# Patient Record
Sex: MALE | Race: WHITE | NOT HISPANIC OR LATINO | Employment: FULL TIME | URBAN - METROPOLITAN AREA
[De-identification: names, ages, dates, MRNs, and addresses within clinical notes are randomized per-mention and may not be internally consistent; named-entity substitution may affect disease eponyms.]

---

## 2017-03-08 ENCOUNTER — TELEPHONE (OUTPATIENT)
Dept: INTERNAL MEDICINE | Facility: CLINIC | Age: 67
End: 2017-03-08

## 2017-03-08 DIAGNOSIS — I10 ESSENTIAL HYPERTENSION: Primary | ICD-10-CM

## 2017-03-08 NOTE — TELEPHONE ENCOUNTER
Pt requesting to speak with  in reference to his blood pressure medication. Pharmacy is [facility]. Pt Ph# [phone] carlos/ma

## 2017-03-09 ENCOUNTER — TELEPHONE (OUTPATIENT)
Dept: INTERNAL MEDICINE | Facility: CLINIC | Age: 67
End: 2017-03-09

## 2017-03-09 RX ORDER — NIFEDIPINE 30 MG/1
60 TABLET, FILM COATED, EXTENDED RELEASE ORAL DAILY
Qty: 180 TABLET | Refills: 3 | Status: SHIPPED | OUTPATIENT
Start: 2017-03-09 | End: 2017-03-21

## 2017-03-10 DIAGNOSIS — B18.1 CHRONIC HEPATITIS B VIRUS INFECTION: Primary | ICD-10-CM

## 2017-03-10 RX ORDER — LAMIVUDINE 100 MG/1
100 TABLET, FILM COATED ORAL DAILY
Qty: 90 TABLET | Refills: 0 | Status: SHIPPED | OUTPATIENT
Start: 2017-03-10 | End: 2017-04-09

## 2017-03-21 ENCOUNTER — OFFICE VISIT (OUTPATIENT)
Dept: INTERNAL MEDICINE | Facility: CLINIC | Age: 67
End: 2017-03-21
Payer: COMMERCIAL

## 2017-03-21 VITALS
OXYGEN SATURATION: 97 % | RESPIRATION RATE: 18 BRPM | HEIGHT: 72 IN | BODY MASS INDEX: 30.58 KG/M2 | WEIGHT: 225.75 LBS | SYSTOLIC BLOOD PRESSURE: 158 MMHG | HEART RATE: 90 BPM | DIASTOLIC BLOOD PRESSURE: 68 MMHG

## 2017-03-21 DIAGNOSIS — N18.30 CKD (CHRONIC KIDNEY DISEASE) STAGE 3, GFR 30-59 ML/MIN: ICD-10-CM

## 2017-03-21 DIAGNOSIS — E55.9 VITAMIN D DEFICIENCY: Chronic | ICD-10-CM

## 2017-03-21 DIAGNOSIS — B18.1 CHRONIC VIRAL HEPATITIS B WITHOUT DELTA AGENT AND WITHOUT COMA: Primary | Chronic | ICD-10-CM

## 2017-03-21 DIAGNOSIS — I10 ESSENTIAL HYPERTENSION: ICD-10-CM

## 2017-03-21 PROCEDURE — 1160F RVW MEDS BY RX/DR IN RCRD: CPT | Mod: S$GLB,,, | Performed by: INTERNAL MEDICINE

## 2017-03-21 PROCEDURE — 99214 OFFICE O/P EST MOD 30 MIN: CPT | Mod: S$GLB,,, | Performed by: INTERNAL MEDICINE

## 2017-03-21 PROCEDURE — 3078F DIAST BP <80 MM HG: CPT | Mod: S$GLB,,, | Performed by: INTERNAL MEDICINE

## 2017-03-21 PROCEDURE — 3077F SYST BP >= 140 MM HG: CPT | Mod: S$GLB,,, | Performed by: INTERNAL MEDICINE

## 2017-03-21 RX ORDER — HYDRALAZINE HYDROCHLORIDE 50 MG/1
TABLET, FILM COATED ORAL
Refills: 3 | COMMUNITY
Start: 2017-03-08

## 2017-03-21 RX ORDER — LOSARTAN POTASSIUM 100 MG/1
100 TABLET ORAL DAILY
Refills: 3 | COMMUNITY
Start: 2017-01-12

## 2017-03-21 RX ORDER — DOXAZOSIN 8 MG/1
8 TABLET ORAL NIGHTLY
Refills: 3 | COMMUNITY
Start: 2017-01-12

## 2017-03-21 NOTE — MR AVS SNAPSHOT
[facility] Medicine  [address],  [address]  [place] [address]  Phone: [phone]  Fax: [phone]                  Nader Clarke   3/21/2017 10:20 AM   Office Visit    Description:  Male : 1950   Provider:  Shashank Tran MD   Department:  [facility]           Reason for Visit     Hepatitis B     Results           Diagnoses this Visit        Comments    Chronic viral hepatitis B without delta agent and without coma    -  Primary     Essential hypertension         CKD (chronic kidney disease) stage 3, GFR 30-59 ml/min         Vitamin D deficiency                To Do List           Future Appointments        Provider Department Dept Phone    3/21/2017 10:20 AM Shashank Tran MD [facility] [phone]      Goals (5 Years of Data)     None      [facility]     [facility] On [facility] Nurse Care Line -  Assistance  Registered nurses in the [facility] Center provide clinical advisement, health education, appointment booking, and other advisory services.  Call for this free service at [phone].             Medications           Message regarding Medications     Verify the changes and/or additions to your medication regime listed below are the same as discussed with your clinician today.  If any of these changes or additions are incorrect, please notify your healthcare provider.        STOP taking these medications     nifedipine (ADALAT CC) 30 MG TbSR Take 2 tablets (60 mg total) by mouth once daily.           Verify that the below list of medications is an accurate representation of the medications you are currently taking.  If none reported, the list may be blank. If incorrect, please contact your healthcare provider. Carry this list with you in case of emergency.           Current Medications     allopurinol (ZYLOPRIM) 100 MG tablet Take 100 mg by mouth Daily.    cyanocobalamin (VITAMIN B-12) 100 MCG tablet Take  100 mcg by mouth once daily.    cyclobenzaprine (FLEXERIL) 10 MG tablet Take 10 mg by mouth 2 (two) times daily.    doxazosin (CARDURA) 8 MG Tab Take 8 mg by mouth every evening.    ergocalciferol (ERGOCALCIFEROL) 50,000 unit Cap Take 1 capsule (50,000 Units total) by mouth every 7 days.    hydrALAZINE (APRESOLINE) 50 MG tablet TAKE 1 TABLET BY MOUTH THREE TIMES A DAY HOLD IF BLOOD PRESSURE IS LESS THAN 130 SYSTOLIC.    hydrocodone-acetaminophen 7.5-325mg (NORCO) 7.5-325 mg per tablet Take 1 tablet by mouth every 12 (twelve) hours as needed.    lamivudine (EPIVIR) 100 MG Tab Take 1 tablet (100 mg total) by mouth once daily.    losartan (COZAAR) 100 MG tablet Take 100 mg by mouth once daily.           Clinical Reference Information           Your Vitals Were     BP Pulse Resp Height Weight SpO2    158/68 (BP Location: Left arm, Patient Position: Sitting, BP Method: Manual) 90 18 6' (1.829 m) 102.4 kg (225 lb 12 oz) 97%    BMI                30.62 kg/m2          Blood Pressure          Most Recent Value    BP  (!)  158/68      Allergies as of 3/21/2017     No Known Allergies      Immunizations Administered on Date of Encounter - 3/21/2017     None      [facility] Sign-Up     Activating your [facility] account is as easy as 1-2-3!     1) Visit [email or web address], select Sign Up Now, enter this activation code and your date of birth, then select Next.  [email or web address]  Expires: 5/5/2017 10:13 AM      2) Create a username and password to use when you visit [facility] in the future and select a security question in case you lose your password and select Next.    3) Enter your e-mail address and click Sign Up!    Additional Information  If you have questions, please e-mail [email or web address] or call [phone] to talk to our [facility] staff. Remember, [facility] is NOT to be used for urgent needs. For medical emergencies, dial 911.         Language Assistance Services     ATTENTION: Language assistance services are  available, free of charge. Please call [phone].      ATENCIÓN: Si habla regina, tiene a fishman disposición servicios gratuitos de asistencia lingüística. Llame al [phone].     CHÚ Ý: N?u b?n nói Ti?ng Vi?t, có các d?ch v? h? tr? ngôn ng? mi?n phí dành cho b?n. G?i s? [phone].         [facility] complies with applicable Federal civil rights laws and does not discriminate on the basis of race, color, national origin, age, disability, or sex.

## 2017-03-21 NOTE — PROGRESS NOTES
Subjective:      Patient ID: Nader Clarke is a 66 y.o. male.    Chief Complaint: Hepatitis B and Results    HPI:  Issues:  -CKD Stage 3, sent by Nephrologist to Cardio ( Dr. Laird) who did an Echo, which showed AS.  -He also recommended a PFT due to higher pressures.  -Additionally, he is being treated for chronic hepatitis B.    More fatigued now.      Review of Systems   Constitutional: Positive for fatigue.   HENT: Negative.    Eyes: Negative.    Respiratory: Positive for shortness of breath.    Cardiovascular: Negative.    Gastrointestinal: Negative.    Endocrine: Negative.    Genitourinary: Negative.    Musculoskeletal: Positive for arthralgias.   Skin:        Lower legs itch   Allergic/Immunologic: Positive for immunocompromised state.   Neurological: Negative.    Hematological: Negative.    Psychiatric/Behavioral: Negative.      Of note  : Hep B genotype A, now with a viral load of 10, log 1.0                    6/16 Hep Be antigen+,   HepBSAg +  Hep B SAb -  Objective:   BP (!) 158/68 (BP Location: Left arm, Patient Position: Sitting, BP Method: Manual)  Pulse 90  Resp 18  Ht 6' (1.829 m)  Wt 102.4 kg (225 lb 12 oz)  SpO2 97%  BMI 30.62 kg/m2    Physical Exam   Constitutional: He is oriented to person, place, and time. He appears well-developed and well-nourished. No distress.   HENT:   Head: Normocephalic and atraumatic.   Right Ear: External ear normal.   Left Ear: External ear normal.   Nose: Nose normal.   Mouth/Throat: Oropharynx is clear and moist.   Eyes: Conjunctivae and EOM are normal. Pupils are equal, round, and reactive to light.   Neck: Normal range of motion. Neck supple. No JVD present. No thyromegaly present.   Cardiovascular: Normal rate, regular rhythm and normal pulses.   No extrasystoles are present.   Murmur heard.  Pulses:       Carotid pulses are 2+ on the right side, and 2+ on the left side.       Radial pulses are 2+ on the right side, and 2+ on the left side.         Femoral pulses are 2+ on the right side, and 2+ on the left side.       Popliteal pulses are 2+ on the right side, and 2+ on the left side.        Dorsalis pedis pulses are 2+ on the right side, and 2+ on the left side.        Posterior tibial pulses are 2+ on the right side, and 2+ on the left side.   Pulmonary/Chest: Effort normal and breath sounds normal. He has no wheezes. He has no rales.   Abdominal: Soft. Bowel sounds are normal. There is no hepatosplenomegaly. There is no tenderness.   Musculoskeletal: Normal range of motion.   Neurological: He is alert and oriented to person, place, and time.   Skin: Skin is warm and dry. He is not diaphoretic.   Psychiatric: He has a normal mood and affect. His behavior is normal. Thought content normal.       Assessment:     1. Chronic viral hepatitis B without delta agent and without coma    2. Essential hypertension    3. CKD (chronic kidney disease) stage 3, GFR 30-59 ml/min    4. Vitamin D deficiency      Plan:     Chronic viral hepatitis B without delta agent and without coma  -     Hepatitis delta virus; Future; Expected date: 6/22/17  -     Hepatitis B e antibody; Future; Expected date: 6/22/17  -     Hepatitis B e antigen; Future; Expected date: 6/22/17  -     HEPATITIS B VIRAL DNA, QUANTITATIVE; Future; Expected date: 6/22/17  -     Hepatic function panel; Future; Expected date: 6/22/17    Essential hypertension  -     CBC auto differential; Future; Expected date: 6/22/17  -     Comprehensive metabolic panel; Future; Expected date: 6/22/17    CKD (chronic kidney disease) stage 3, GFR 30-59 ml/min  -     Comprehensive metabolic panel; Future; Expected date: 6/22/17    Vitamin D deficiency

## 2017-05-03 ENCOUNTER — TELEPHONE (OUTPATIENT)
Dept: INTERNAL MEDICINE | Facility: CLINIC | Age: 67
End: 2017-05-03

## 2017-05-03 RX ORDER — ERGOCALCIFEROL 1.25 MG/1
50000 CAPSULE ORAL
Qty: 12 CAPSULE | Refills: 3 | Status: SHIPPED | OUTPATIENT
Start: 2017-05-03 | End: 2017-12-14 | Stop reason: SDUPTHER

## 2017-05-03 NOTE — TELEPHONE ENCOUNTER
Pt requesting medication refill on Vitamin D2 50,000 UNIT Capsule. Take one capsul by mouth every seven(7) days. Pharmacy [facility] MAIL ORDER. Vf/ma

## 2017-05-05 ENCOUNTER — TELEPHONE (OUTPATIENT)
Dept: INTERNAL MEDICINE | Facility: CLINIC | Age: 67
End: 2017-05-05

## 2017-05-05 NOTE — TELEPHONE ENCOUNTER
----- Message from Cele Duque sent at 5/5/2017  9:05 AM CDT -----  Contact: [facility] / [phone]  Calling about a request sent over for patients Vitamin D .

## 2017-06-07 ENCOUNTER — TELEPHONE (OUTPATIENT)
Dept: INTERNAL MEDICINE | Facility: CLINIC | Age: 67
End: 2017-06-07

## 2017-06-11 LAB
ALBUMIN SERPL-MCNC: 4.4 G/DL (ref 3.6–4.8)
ALBUMIN/GLOB SERPL: 1.6 {RATIO} (ref 1.2–2.2)
ALP SERPL-CCNC: 71 IU/L (ref 39–117)
ALT SERPL-CCNC: 10 IU/L (ref 0–44)
AST SERPL-CCNC: 15 IU/L (ref 0–40)
BASOPHILS # BLD AUTO: 0.1 X10E3/UL (ref 0–0.2)
BASOPHILS NFR BLD AUTO: 1 %
BILIRUB DIRECT SERPL-MCNC: 0.09 MG/DL (ref 0–0.4)
BILIRUB SERPL-MCNC: 0.3 MG/DL (ref 0–1.2)
BUN SERPL-MCNC: 17 MG/DL (ref 8–27)
BUN/CREAT SERPL: 13 (ref 10–24)
CALCIUM SERPL-MCNC: 9.1 MG/DL (ref 8.6–10.2)
CHLORIDE SERPL-SCNC: 102 MMOL/L (ref 96–106)
CO2 SERPL-SCNC: 19 MMOL/L (ref 18–29)
CREAT SERPL-MCNC: 1.29 MG/DL (ref 0.76–1.27)
EOSINOPHIL # BLD AUTO: 0.3 X10E3/UL (ref 0–0.4)
EOSINOPHIL NFR BLD AUTO: 3 %
ERYTHROCYTE [DISTWIDTH] IN BLOOD BY AUTOMATED COUNT: 16.3 % (ref 12.3–15.4)
GLOBULIN SER CALC-MCNC: 2.7 G/DL (ref 1.5–4.5)
GLUCOSE SERPL-MCNC: 104 MG/DL (ref 65–99)
HBV DNA SERPL NAA+PROBE-ACNC: <10 IU/ML
HBV DNA SERPL NAA+PROBE-LOG IU: NORMAL LOG10IU/ML
HBV E AG SERPL QL IA: NEGATIVE
HCT VFR BLD AUTO: 39.7 % (ref 37.5–51)
HGB BLD-MCNC: 13.2 G/DL (ref 12.6–17.7)
IMM GRANULOCYTES # BLD: 0 X10E3/UL (ref 0–0.1)
IMM GRANULOCYTES NFR BLD: 0 %
LYMPHOCYTES # BLD AUTO: 2.3 X10E3/UL (ref 0.7–3.1)
LYMPHOCYTES NFR BLD AUTO: 22 %
MCH RBC QN AUTO: 31.6 PG (ref 26.6–33)
MCHC RBC AUTO-ENTMCNC: 33.2 G/DL (ref 31.5–35.7)
MCV RBC AUTO: 95 FL (ref 79–97)
MONOCYTES # BLD AUTO: 0.7 X10E3/UL (ref 0.1–0.9)
MONOCYTES NFR BLD AUTO: 7 %
NEUTROPHILS # BLD AUTO: 6.7 X10E3/UL (ref 1.4–7)
NEUTROPHILS NFR BLD AUTO: 67 %
PLATELET # BLD AUTO: 276 X10E3/UL (ref 150–379)
POTASSIUM SERPL-SCNC: 4.8 MMOL/L (ref 3.5–5.2)
PROT SERPL-MCNC: 7.1 G/DL (ref 6–8.5)
RBC # BLD AUTO: 4.18 X10E6/UL (ref 4.14–5.8)
REF LAB TEST REF RANGE: NORMAL
SODIUM SERPL-SCNC: 139 MMOL/L (ref 134–144)
WBC # BLD AUTO: 10.1 X10E3/UL (ref 3.4–10.8)

## 2017-06-29 ENCOUNTER — OFFICE VISIT (OUTPATIENT)
Dept: INTERNAL MEDICINE | Facility: CLINIC | Age: 67
End: 2017-06-29
Payer: MEDICARE

## 2017-06-29 VITALS
RESPIRATION RATE: 18 BRPM | SYSTOLIC BLOOD PRESSURE: 132 MMHG | DIASTOLIC BLOOD PRESSURE: 78 MMHG | TEMPERATURE: 97 F | HEART RATE: 72 BPM | BODY MASS INDEX: 29.71 KG/M2 | OXYGEN SATURATION: 98 % | WEIGHT: 219.38 LBS | HEIGHT: 72 IN

## 2017-06-29 DIAGNOSIS — B18.1 CHRONIC VIRAL HEPATITIS B WITHOUT DELTA AGENT AND WITHOUT COMA: Primary | Chronic | ICD-10-CM

## 2017-06-29 DIAGNOSIS — I10 ESSENTIAL HYPERTENSION: ICD-10-CM

## 2017-06-29 DIAGNOSIS — N18.30 CKD (CHRONIC KIDNEY DISEASE), STAGE III: ICD-10-CM

## 2017-06-29 DIAGNOSIS — E55.9 VITAMIN D DEFICIENCY: Chronic | ICD-10-CM

## 2017-06-29 PROCEDURE — 99214 OFFICE O/P EST MOD 30 MIN: CPT | Mod: S$GLB,,, | Performed by: INTERNAL MEDICINE

## 2017-06-29 NOTE — PROGRESS NOTES
Subjective:      Patient ID: Nader Clarke is a 67 y.o. male.    Chief Complaint: Results (Lab results) and Medication Refill    HPI: 67y/oWM, being followed for a chronic hep B, passive  On Lamivudine.  While he was on NSAI, and had HTN, and was on Viread, he had an episode of  CKD Stage 3.   This is slowly resolving.  He sees Nephrologist,Cardiologist  [facility] the [facility].  Otherwise, his bariatric surgery, which was successful, continues as such.  However, the DJD that arose from his obesity is still giving him trouble.      Review of Systems   HENT: Negative.    Respiratory: Negative for chest tightness, shortness of breath and wheezing.    Cardiovascular: Negative for chest pain, palpitations and leg swelling.   Gastrointestinal: Negative for abdominal distention, abdominal pain, anal bleeding, blood in stool, constipation, diarrhea, nausea, rectal pain and vomiting.   Musculoskeletal: Positive for arthralgias and myalgias.   Allergic/Immunologic: Positive for immunocompromised state.   Neurological: Negative for weakness and headaches.   Psychiatric/Behavioral: Negative for decreased concentration and dysphoric mood. The patient is not nervous/anxious.    All other systems reviewed and are negative.      Recent Labs  Lab Result Units 06/09/17  0709   Platelets x10E3/uL 276   Glucose mg/dL 104*   BUN, Bld mg/dL 17   Creatinine mg/dL 1.29*   Calcium mg/dL 9.1   Sodium mmol/L 139     /09/17 0709 WBC 10.1 - Final result   06/09/17 0709 RBC 4.18 - Final result   06/09/17 0709 HGB 13.2 - Final result   06/09/17 0709 HCT 39.7 - Final result   06/09/17 0709 MCH 31.6 - Final result   06/09/17 0709 RDW 16.3 High          Recent Labs  Lab Result Units 06/09/17  0709   Potassium mmol/L 4.8   Chloride mmol/L 102   Total Protein g/dL 7.1   Albumin g/dL 4.4   Total Bilirubin mg/dL 0.3   AST IU/L 15   Alkaline Phosphatase IU/L 71       Recent Labs  Lab Result Units 06/09/17  0709   CO2 mmol/L 19   ALT IU/L 10   eGFR if non  African American mL/min/1.73 58*   MCV fL 95     Hep B viral load by branched DNA is non detected.    Objective:   /78 (BP Location: Left arm, Patient Position: Sitting, BP Method: Manual)   Pulse 72   Temp 97 °F (36.1 °C) (Oral)   Resp 18   Ht 6' (1.829 m)   Wt 99.5 kg (219 lb 5.7 oz)   SpO2 98%   BMI 29.75 kg/m²     Physical Exam   Constitutional: He is oriented to person, place, and time. He appears well-developed and well-nourished.   HENT:   Head: Normocephalic and atraumatic.   Right Ear: External ear normal.   Left Ear: External ear normal.   Nose: Nose normal.   Mouth/Throat: Oropharynx is clear and moist.   Eyes: Conjunctivae and EOM are normal. Pupils are equal, round, and reactive to light.   Neck: Normal range of motion. Neck supple.   Cardiovascular: Normal rate, regular rhythm and normal heart sounds.    Pulmonary/Chest: Effort normal and breath sounds normal.   Abdominal: Soft. Bowel sounds are normal. He exhibits no ascites. There is no hepatosplenomegaly. There is no tenderness.   Musculoskeletal: Normal range of motion.   Neurological: He is alert and oriented to person, place, and time.   Skin: Skin is warm and dry.   Psychiatric: He has a normal mood and affect. His behavior is normal.   Nursing note and vitals reviewed.      Assessment:     1. Chronic viral hepatitis B without delta agent and without coma    2. Vitamin D deficiency    3. Essential hypertension    4. CKD (chronic kidney disease), stage III    All are stable, with SVR.  Plan:     Chronic viral hepatitis B without delta agent and without coma  -     Hepatic function panel; Future; Expected date: 06/29/2017  -     Cancel: HEPATITIS B VIRAL DNA, QUANTITATIVE; Future; Expected date: 12/29/2017  -     HEPATITIS B VIRAL DNA, QUANTITATIVE; Future; Expected date: 12/29/2017    Vitamin D deficiency  -     Vitamin D; Future; Expected date: 12/29/2017    Essential hypertension  -     Basic metabolic panel; Future; Expected date:  12/29/2017    CKD (chronic kidney disease), stage III    same

## 2017-11-10 ENCOUNTER — TELEPHONE (OUTPATIENT)
Dept: INTERNAL MEDICINE | Facility: CLINIC | Age: 67
End: 2017-11-10

## 2017-11-10 NOTE — TELEPHONE ENCOUNTER
Patient requesting to speak only to Dr. Tran in reference to medication and changing his insurance before December 2017.Pt ph# [phone].vf/ma

## 2017-11-10 NOTE — TELEPHONE ENCOUNTER
Pt requesting to speak only to Dr. Tran in reference to him changing insurance and his medication. Patient Ph# [phone]. Vf/ma

## 2017-11-17 NOTE — TELEPHONE ENCOUNTER
Dr. Tran spoke with pt today in reference to him changing his  medical insurance. Pt will not change his medical insurance per Dr. Tran. Vf/ma

## 2017-12-05 LAB
25(OH)D3+25(OH)D2 SERPL-MCNC: 71 NG/ML (ref 30–100)
ALBUMIN SERPL-MCNC: 4.3 G/DL (ref 3.6–4.8)
ALP SERPL-CCNC: 67 IU/L (ref 39–117)
ALT SERPL-CCNC: 10 IU/L (ref 0–44)
AST SERPL-CCNC: 15 IU/L (ref 0–40)
BILIRUB DIRECT SERPL-MCNC: 0.11 MG/DL (ref 0–0.4)
BILIRUB SERPL-MCNC: 0.3 MG/DL (ref 0–1.2)
BUN SERPL-MCNC: 16 MG/DL (ref 8–27)
BUN/CREAT SERPL: 13 (ref 10–24)
CALCIUM SERPL-MCNC: 9.3 MG/DL (ref 8.6–10.2)
CHLORIDE SERPL-SCNC: 104 MMOL/L (ref 96–106)
CO2 SERPL-SCNC: 20 MMOL/L (ref 18–29)
CREAT SERPL-MCNC: 1.27 MG/DL (ref 0.76–1.27)
GFR SERPLBLD CREATININE-BSD FMLA CKD-EPI: 59 ML/MIN/1.73
GFR SERPLBLD CREATININE-BSD FMLA CKD-EPI: 68 ML/MIN/1.73
GLUCOSE SERPL-MCNC: 98 MG/DL (ref 65–99)
POTASSIUM SERPL-SCNC: 4.6 MMOL/L (ref 3.5–5.2)
PROT SERPL-MCNC: 7.7 G/DL (ref 6–8.5)
SODIUM SERPL-SCNC: 140 MMOL/L (ref 134–144)

## 2017-12-14 ENCOUNTER — TELEPHONE (OUTPATIENT)
Dept: ADMINISTRATIVE | Facility: HOSPITAL | Age: 67
End: 2017-12-14

## 2017-12-14 ENCOUNTER — OFFICE VISIT (OUTPATIENT)
Dept: INTERNAL MEDICINE | Facility: CLINIC | Age: 67
End: 2017-12-14
Payer: MEDICARE

## 2017-12-14 VITALS
DIASTOLIC BLOOD PRESSURE: 68 MMHG | SYSTOLIC BLOOD PRESSURE: 122 MMHG | RESPIRATION RATE: 18 BRPM | WEIGHT: 213.88 LBS | BODY MASS INDEX: 28.97 KG/M2 | HEART RATE: 68 BPM | TEMPERATURE: 98 F | HEIGHT: 72 IN | OXYGEN SATURATION: 97 %

## 2017-12-14 DIAGNOSIS — I10 ESSENTIAL HYPERTENSION: ICD-10-CM

## 2017-12-14 DIAGNOSIS — Z11.59 NEED FOR HEPATITIS C SCREENING TEST: ICD-10-CM

## 2017-12-14 DIAGNOSIS — R21 RASH: ICD-10-CM

## 2017-12-14 DIAGNOSIS — B18.1 CHRONIC VIRAL HEPATITIS B WITHOUT DELTA AGENT AND WITHOUT COMA: Primary | Chronic | ICD-10-CM

## 2017-12-14 DIAGNOSIS — E55.9 VITAMIN D DEFICIENCY: Chronic | ICD-10-CM

## 2017-12-14 DIAGNOSIS — N18.30 CKD (CHRONIC KIDNEY DISEASE), STAGE III: ICD-10-CM

## 2017-12-14 PROCEDURE — 99999 PR PBB SHADOW E&M-EST. PATIENT-LVL III: CPT | Mod: PBBFAC,,, | Performed by: INTERNAL MEDICINE

## 2017-12-14 PROCEDURE — 99214 OFFICE O/P EST MOD 30 MIN: CPT | Mod: S$GLB,,, | Performed by: INTERNAL MEDICINE

## 2017-12-14 RX ORDER — ERGOCALCIFEROL 1.25 MG/1
50000 CAPSULE ORAL
Qty: 12 CAPSULE | Refills: 3 | Status: SHIPPED | OUTPATIENT
Start: 2017-12-14 | End: 2017-12-14 | Stop reason: SDUPTHER

## 2017-12-14 RX ORDER — ERGOCALCIFEROL 1.25 MG/1
50000 CAPSULE ORAL
Qty: 12 CAPSULE | Refills: 3 | Status: SHIPPED | OUTPATIENT
Start: 2017-12-14

## 2017-12-14 RX ORDER — LAMIVUDINE 100 MG/1
150 TABLET, FILM COATED ORAL DAILY
COMMUNITY
End: 2017-12-14 | Stop reason: SDUPTHER

## 2017-12-14 RX ORDER — LAMIVUDINE 100 MG/1
100 TABLET, FILM COATED ORAL DAILY
Qty: 90 TABLET | Refills: 3 | Status: SHIPPED | OUTPATIENT
Start: 2017-12-14

## 2017-12-14 NOTE — PROGRESS NOTES
Subjective:      Patient ID: Nader Clarke is a 67 y.o. male.    Chief Complaint: Hepatitis B    HPI: 67 y.o. White male, is doing very well on lamivudine.  His only complaints are: arthritic pains hands,feet,knees and hips.  No N,V,D,C, flor colored stools.    States that he had had some cervical lymph nodes under his right side of his jaw,  And diaphoresis. He went to his PCP, who though he had an infection, so he placed him on   Augmentin.  Several days later, he broke out in a head to toe red rash. He was told he was   Allergic to Ampicillin. ????      Labs:  12/04/17 1004 GLU 98 - Final result   12/04/17 1004 BUN 16 - Final result   12/04/17 1004 CREATININE 1.27 - Final result   12/04/17 1004 CALCIUM 9.3 - Final result   12/04/17 1004  - Final result   12/04/17 1004 K 4.6 - Final result   12/04/17 1004  - Final result   12/04/17 1007 PROT 7.7 - Final result   12/04/17 1007 ALBUMIN 4.3 - Final result   12/04/17 1007 BILITOT 0.3 - Final result   12/04/17 1007 AST 15 - Final result   12/04/17 1007 ALKPHOS 67 - Final result   12/04/17 1004 CO2 20 - Final result   12/04/17 1007 ALT 10 - Final result   12/04/17 1004 EGFRNONAA 59 Low Final result   Hep B viral load by B-DNA  Negative.    Review of Systems   Constitutional: Negative for activity change, appetite change, chills, diaphoresis, fatigue and fever.   Eyes: Negative.    Respiratory: Negative for cough and chest tightness.    Cardiovascular: Negative for chest pain.   Gastrointestinal: Negative for abdominal distention, abdominal pain, anal bleeding, blood in stool, constipation, diarrhea, nausea, rectal pain and vomiting.   Endocrine: Negative.    Genitourinary: Negative for enuresis and hematuria.   Musculoskeletal: Positive for arthralgias and back pain.   Skin: Negative for color change.   Neurological: Negative.    Psychiatric/Behavioral: Negative.  Negative for sleep disturbance and suicidal ideas.       Objective:   /68 (BP Location:  Right arm, Patient Position: Sitting, BP Method: Large (Manual))   Pulse 68   Temp 98.1 °F (36.7 °C) (Oral)   Resp 18   Ht 6' (1.829 m)   Wt 97 kg (213 lb 13.5 oz)   SpO2 97%   BMI 29.00 kg/m²     Physical Exam   Constitutional: He is oriented to person, place, and time. He appears well-developed and well-nourished.   HENT:   Head: Normocephalic and atraumatic.   Right Ear: External ear normal.   Left Ear: External ear normal.   Nose: Nose normal.   Mouth/Throat: Oropharynx is clear and moist.   Eyes: Conjunctivae and EOM are normal. Pupils are equal, round, and reactive to light.   Neck: Normal range of motion. Neck supple.   Cardiovascular: Normal rate, regular rhythm and normal heart sounds.    Pulmonary/Chest: Effort normal and breath sounds normal.   Abdominal: Soft. Bowel sounds are normal. There is no hepatosplenomegaly. There is no tenderness. No hernia.   Musculoskeletal: Normal range of motion.   Neurological: He is alert and oriented to person, place, and time.   Skin: Skin is warm and dry.   Psychiatric: He has a normal mood and affect. His behavior is normal. Judgment and thought content normal.   Nursing note and vitals reviewed.      Assessment:     1. Chronic viral hepatitis B without delta agent and without coma    2. CKD (chronic kidney disease), stage III    3. Essential hypertension    4. Vitamin D deficiency    5. Need for hepatitis C screening test    6. Rash    6. Rash, indeed this may not have been a PCN/amp allergy, but may have been an immune mediated reaction   An EBV infection..      Plan:     Chronic viral hepatitis B without delta agent and without coma  -     Cancel: HEPATITIS B VIRAL DNA, QUANTITATIVE; Future; Expected date: 06/14/2018  -     Cancel: Hepatic function panel; Future; Expected date: 12/14/2017  -     Cancel: Basic metabolic panel; Future; Expected date: 12/14/2017  -     Cancel: CBC auto differential; Future; Expected date: 12/14/2017  -     HEPATITIS B VIRAL DNA,  QUANTITATIVE; Future; Expected date: 06/14/2018  -     Hepatic function panel; Future; Expected date: 12/14/2017  -     Basic metabolic panel; Future; Expected date: 12/14/2017  -     CBC auto differential; Future; Expected date: 12/14/2017  -     US Abdomen Complete; Future; Expected date: 12/14/2017  -     lamiVUDine (EPIVIR) 100 MG Tab; Take 1 tablet (100 mg total) by mouth once daily.  Dispense: 90 tablet; Refill: 3    CKD (chronic kidney disease), stage III  -     US Abdomen Complete; Future; Expected date: 12/14/2017    Essential hypertension  -     US Abdomen Complete; Future; Expected date: 12/14/2017    Vitamin D deficiency  -     Discontinue: ergocalciferol (ERGOCALCIFEROL) 50,000 unit Cap; Take 1 capsule (50,000 Units total) by mouth every 7 days.  Dispense: 12 capsule; Refill: 3  -     Discontinue: ergocalciferol (ERGOCALCIFEROL) 50,000 unit Cap; Take 1 capsule (50,000 Units total) by mouth every 7 days.  Dispense: 12 capsule; Refill: 3    Need for hepatitis C screening test  -     Cancel: Hepatitis C antibody; Future; Expected date: 12/14/2017  -     Hepatitis C antibody; Future; Expected date: 12/14/2017    Rash  -     Kassandra-Barr Virus antibody panel; Future; Expected date: 06/14/2018    Other orders  -     Cancel: Hepatitis C antibody; Future; Expected date: 12/14/2017

## 2018-09-27 ENCOUNTER — TELEPHONE (OUTPATIENT)
Dept: ADMINISTRATIVE | Facility: HOSPITAL | Age: 68
End: 2018-09-27

## 2019-05-21 DIAGNOSIS — Z12.11 COLON CANCER SCREENING: ICD-10-CM

## 2024-10-24 ENCOUNTER — ANESTHESIA (OUTPATIENT)
Dept: CARDIOLOGY | Facility: HOSPITAL | Age: 74
DRG: 308 | End: 2024-10-24
Payer: MEDICARE

## 2024-10-24 ENCOUNTER — ANESTHESIA EVENT (OUTPATIENT)
Dept: CARDIOLOGY | Facility: HOSPITAL | Age: 74
DRG: 308 | End: 2024-10-24
Payer: MEDICARE

## 2024-10-24 ENCOUNTER — HOSPITAL ENCOUNTER (INPATIENT)
Facility: HOSPITAL | Age: 74
LOS: 2 days | Discharge: HOME | DRG: 308 | End: 2024-10-26
Attending: EMERGENCY MEDICINE | Admitting: HOSPITALIST
Payer: MEDICARE

## 2024-10-24 DIAGNOSIS — R07.9 CHEST PAIN: ICD-10-CM

## 2024-10-24 DIAGNOSIS — I48.0 PAROXYSMAL ATRIAL FIBRILLATION WITH RVR: Primary | ICD-10-CM

## 2024-10-24 DIAGNOSIS — I48.91 ATRIAL FIBRILLATION WITH RVR: ICD-10-CM

## 2024-10-24 DIAGNOSIS — R06.02 SHORTNESS OF BREATH: ICD-10-CM

## 2024-10-24 DIAGNOSIS — I48.91 ATRIAL FIBRILLATION: ICD-10-CM

## 2024-10-24 DIAGNOSIS — I50.9 ACUTE CONGESTIVE HEART FAILURE, UNSPECIFIED HEART FAILURE TYPE: ICD-10-CM

## 2024-10-24 PROBLEM — I35.0 NONRHEUMATIC AORTIC VALVE STENOSIS: Status: ACTIVE | Noted: 2024-10-24

## 2024-10-24 PROBLEM — J32.9 CHRONIC RHINOSINUSITIS: Status: ACTIVE | Noted: 2024-10-24

## 2024-10-24 PROBLEM — D53.9 MACROCYTIC ANEMIA: Status: ACTIVE | Noted: 2024-10-24

## 2024-10-24 PROBLEM — F17.210 TOBACCO DEPENDENCE DUE TO CIGARETTES: Status: ACTIVE | Noted: 2024-10-24

## 2024-10-24 PROBLEM — J06.9 VIRAL UPPER RESPIRATORY TRACT INFECTION: Status: ACTIVE | Noted: 2024-10-24

## 2024-10-24 PROBLEM — Z71.89 ADVANCE CARE PLANNING: Status: ACTIVE | Noted: 2024-10-24

## 2024-10-24 PROBLEM — F43.20 SITUATIONAL DISTURBANCE: Status: ACTIVE | Noted: 2024-10-24

## 2024-10-24 LAB
ALBUMIN SERPL BCP-MCNC: 3.9 G/DL (ref 3.5–5.2)
ALP SERPL-CCNC: 82 U/L (ref 40–150)
ALT SERPL W/O P-5'-P-CCNC: 16 U/L (ref 10–44)
ANION GAP SERPL CALC-SCNC: 11 MMOL/L (ref 8–16)
AST SERPL-CCNC: 18 U/L (ref 10–40)
BASOPHILS # BLD AUTO: 0.08 K/UL (ref 0–0.2)
BASOPHILS NFR BLD: 0.7 % (ref 0–1.9)
BILIRUB SERPL-MCNC: 0.5 MG/DL (ref 0.1–1)
BNP SERPL-MCNC: 594 PG/ML (ref 0–99)
BSA FOR ECHO PROCEDURE: 2.15 M2
BUN SERPL-MCNC: 20 MG/DL (ref 8–23)
CALCIUM SERPL-MCNC: 9.3 MG/DL (ref 8.7–10.5)
CHLORIDE SERPL-SCNC: 107 MMOL/L (ref 95–110)
CO2 SERPL-SCNC: 21 MMOL/L (ref 23–29)
CREAT SERPL-MCNC: 1.4 MG/DL (ref 0.5–1.4)
CTP QC/QA: YES
D DIMER PPP IA.FEU-MCNC: 0.65 MG/L FEU
DIFFERENTIAL METHOD BLD: ABNORMAL
EOSINOPHIL # BLD AUTO: 0.2 K/UL (ref 0–0.5)
EOSINOPHIL NFR BLD: 1.4 % (ref 0–8)
ERYTHROCYTE [DISTWIDTH] IN BLOOD BY AUTOMATED COUNT: 13.5 % (ref 11.5–14.5)
EST. GFR  (NO RACE VARIABLE): 53 ML/MIN/1.73 M^2
GLUCOSE SERPL-MCNC: 107 MG/DL (ref 70–110)
HCT VFR BLD AUTO: 39.1 % (ref 40–54)
HGB BLD-MCNC: 13.5 G/DL (ref 14–18)
IMM GRANULOCYTES # BLD AUTO: 0.06 K/UL (ref 0–0.04)
IMM GRANULOCYTES NFR BLD AUTO: 0.5 % (ref 0–0.5)
LYMPHOCYTES # BLD AUTO: 1.2 K/UL (ref 1–4.8)
LYMPHOCYTES NFR BLD: 10.9 % (ref 18–48)
MCH RBC QN AUTO: 35.1 PG (ref 27–31)
MCHC RBC AUTO-ENTMCNC: 34.5 G/DL (ref 32–36)
MCV RBC AUTO: 102 FL (ref 82–98)
MONOCYTES # BLD AUTO: 0.8 K/UL (ref 0.3–1)
MONOCYTES NFR BLD: 6.8 % (ref 4–15)
NEUTROPHILS # BLD AUTO: 9 K/UL (ref 1.8–7.7)
NEUTROPHILS NFR BLD: 79.7 % (ref 38–73)
NRBC BLD-RTO: 0 /100 WBC
PLATELET # BLD AUTO: 273 K/UL (ref 150–450)
PMV BLD AUTO: 9.6 FL (ref 9.2–12.9)
POTASSIUM SERPL-SCNC: 3.9 MMOL/L (ref 3.5–5.1)
PROT SERPL-MCNC: 7 G/DL (ref 6–8.4)
RBC # BLD AUTO: 3.85 M/UL (ref 4.6–6.2)
SARS-COV-2 RDRP RESP QL NAA+PROBE: NEGATIVE
SINUS: 3.31 CM
SODIUM SERPL-SCNC: 139 MMOL/L (ref 136–145)
TROPONIN I SERPL DL<=0.01 NG/ML-MCNC: 0.03 NG/ML (ref 0–0.03)
TSH SERPL DL<=0.005 MIU/L-ACNC: 1.03 UIU/ML (ref 0.4–4)
WBC # BLD AUTO: 11.29 K/UL (ref 3.9–12.7)

## 2024-10-24 PROCEDURE — 99285 EMERGENCY DEPT VISIT HI MDM: CPT | Mod: 25

## 2024-10-24 PROCEDURE — 63600175 PHARM REV CODE 636 W HCPCS: Performed by: EMERGENCY MEDICINE

## 2024-10-24 PROCEDURE — 25000242 PHARM REV CODE 250 ALT 637 W/ HCPCS: Performed by: HOSPITALIST

## 2024-10-24 PROCEDURE — 96365 THER/PROPH/DIAG IV INF INIT: CPT

## 2024-10-24 PROCEDURE — D9220A PRA ANESTHESIA: Mod: ANES,,, | Performed by: ANESTHESIOLOGY

## 2024-10-24 PROCEDURE — 85025 COMPLETE CBC W/AUTO DIFF WBC: CPT | Performed by: PHYSICIAN ASSISTANT

## 2024-10-24 PROCEDURE — 25000003 PHARM REV CODE 250: Performed by: INTERNAL MEDICINE

## 2024-10-24 PROCEDURE — 84484 ASSAY OF TROPONIN QUANT: CPT | Performed by: PHYSICIAN ASSISTANT

## 2024-10-24 PROCEDURE — 37000008 HC ANESTHESIA 1ST 15 MINUTES: Performed by: INTERNAL MEDICINE

## 2024-10-24 PROCEDURE — 25000003 PHARM REV CODE 250: Performed by: STUDENT IN AN ORGANIZED HEALTH CARE EDUCATION/TRAINING PROGRAM

## 2024-10-24 PROCEDURE — 96374 THER/PROPH/DIAG INJ IV PUSH: CPT | Mod: 59

## 2024-10-24 PROCEDURE — 63600175 PHARM REV CODE 636 W HCPCS: Performed by: STUDENT IN AN ORGANIZED HEALTH CARE EDUCATION/TRAINING PROGRAM

## 2024-10-24 PROCEDURE — 25000003 PHARM REV CODE 250: Performed by: HOSPITALIST

## 2024-10-24 PROCEDURE — 92960 CARDIOVERSION ELECTRIC EXT: CPT | Mod: 59,,, | Performed by: INTERNAL MEDICINE

## 2024-10-24 PROCEDURE — 85379 FIBRIN DEGRADATION QUANT: CPT | Performed by: EMERGENCY MEDICINE

## 2024-10-24 PROCEDURE — 84443 ASSAY THYROID STIM HORMONE: CPT | Performed by: PHYSICIAN ASSISTANT

## 2024-10-24 PROCEDURE — 63600175 PHARM REV CODE 636 W HCPCS: Performed by: HOSPITALIST

## 2024-10-24 PROCEDURE — 99291 CRITICAL CARE FIRST HOUR: CPT | Mod: 25,,, | Performed by: INTERNAL MEDICINE

## 2024-10-24 PROCEDURE — 96375 TX/PRO/DX INJ NEW DRUG ADDON: CPT

## 2024-10-24 PROCEDURE — 5A2204Z RESTORATION OF CARDIAC RHYTHM, SINGLE: ICD-10-PCS | Performed by: INTERNAL MEDICINE

## 2024-10-24 PROCEDURE — D9220A PRA ANESTHESIA: Mod: CRNA,,, | Performed by: STUDENT IN AN ORGANIZED HEALTH CARE EDUCATION/TRAINING PROGRAM

## 2024-10-24 PROCEDURE — 63600175 PHARM REV CODE 636 W HCPCS: Performed by: INTERNAL MEDICINE

## 2024-10-24 PROCEDURE — 11000001 HC ACUTE MED/SURG PRIVATE ROOM

## 2024-10-24 PROCEDURE — 25000003 PHARM REV CODE 250: Performed by: EMERGENCY MEDICINE

## 2024-10-24 PROCEDURE — 83880 ASSAY OF NATRIURETIC PEPTIDE: CPT | Performed by: PHYSICIAN ASSISTANT

## 2024-10-24 PROCEDURE — 96376 TX/PRO/DX INJ SAME DRUG ADON: CPT

## 2024-10-24 PROCEDURE — 37000009 HC ANESTHESIA EA ADD 15 MINS: Performed by: INTERNAL MEDICINE

## 2024-10-24 PROCEDURE — 87635 SARS-COV-2 COVID-19 AMP PRB: CPT | Performed by: PHYSICIAN ASSISTANT

## 2024-10-24 PROCEDURE — 93010 ELECTROCARDIOGRAM REPORT: CPT | Mod: ,,, | Performed by: INTERNAL MEDICINE

## 2024-10-24 PROCEDURE — 80053 COMPREHEN METABOLIC PANEL: CPT | Performed by: PHYSICIAN ASSISTANT

## 2024-10-24 PROCEDURE — 93005 ELECTROCARDIOGRAM TRACING: CPT

## 2024-10-24 RX ORDER — PANTOPRAZOLE SODIUM 40 MG/1
1 TABLET, DELAYED RELEASE ORAL DAILY
COMMUNITY
Start: 2024-07-08

## 2024-10-24 RX ORDER — DILTIAZEM HYDROCHLORIDE 5 MG/ML
10 INJECTION INTRAVENOUS
Status: COMPLETED | OUTPATIENT
Start: 2024-10-24 | End: 2024-10-24

## 2024-10-24 RX ORDER — ACETAMINOPHEN 325 MG/1
650 TABLET ORAL EVERY 8 HOURS PRN
Status: DISCONTINUED | OUTPATIENT
Start: 2024-10-24 | End: 2024-10-26 | Stop reason: HOSPADM

## 2024-10-24 RX ORDER — LIDOCAINE HYDROCHLORIDE 20 MG/ML
INJECTION INTRAVENOUS
Status: DISCONTINUED | OUTPATIENT
Start: 2024-10-24 | End: 2024-10-24

## 2024-10-24 RX ORDER — LANOLIN ALCOHOL/MO/W.PET/CERES
500 CREAM (GRAM) TOPICAL DAILY
COMMUNITY

## 2024-10-24 RX ORDER — HEPARIN SODIUM,PORCINE/D5W 25000/250
0-40 INTRAVENOUS SOLUTION INTRAVENOUS CONTINUOUS
Status: DISCONTINUED | OUTPATIENT
Start: 2024-10-24 | End: 2024-10-24

## 2024-10-24 RX ORDER — PSYLLIUM HUSK 0.4 G
1 CAPSULE ORAL DAILY
COMMUNITY

## 2024-10-24 RX ORDER — ETOMIDATE 2 MG/ML
INJECTION INTRAVENOUS
Status: DISCONTINUED | OUTPATIENT
Start: 2024-10-24 | End: 2024-10-24

## 2024-10-24 RX ORDER — HYDRALAZINE HYDROCHLORIDE 20 MG/ML
10 INJECTION INTRAMUSCULAR; INTRAVENOUS EVERY 4 HOURS PRN
Status: DISCONTINUED | OUTPATIENT
Start: 2024-10-24 | End: 2024-10-26 | Stop reason: HOSPADM

## 2024-10-24 RX ORDER — ALUMINUM HYDROXIDE, MAGNESIUM HYDROXIDE, AND SIMETHICONE 1200; 120; 1200 MG/30ML; MG/30ML; MG/30ML
30 SUSPENSION ORAL 4 TIMES DAILY PRN
Status: DISCONTINUED | OUTPATIENT
Start: 2024-10-24 | End: 2024-10-26 | Stop reason: HOSPADM

## 2024-10-24 RX ORDER — FUROSEMIDE 10 MG/ML
20 INJECTION INTRAMUSCULAR; INTRAVENOUS
Status: COMPLETED | OUTPATIENT
Start: 2024-10-24 | End: 2024-10-24

## 2024-10-24 RX ORDER — FLUTICASONE PROPIONATE 50 MCG
2 SPRAY, SUSPENSION (ML) NASAL NIGHTLY
Status: DISCONTINUED | OUTPATIENT
Start: 2024-10-24 | End: 2024-10-26 | Stop reason: HOSPADM

## 2024-10-24 RX ORDER — SODIUM CHLORIDE 0.9 % (FLUSH) 0.9 %
10 SYRINGE (ML) INJECTION EVERY 12 HOURS PRN
Status: DISCONTINUED | OUTPATIENT
Start: 2024-10-24 | End: 2024-10-26 | Stop reason: HOSPADM

## 2024-10-24 RX ORDER — FUROSEMIDE 10 MG/ML
20 INJECTION INTRAMUSCULAR; INTRAVENOUS ONCE
Status: COMPLETED | OUTPATIENT
Start: 2024-10-24 | End: 2024-10-24

## 2024-10-24 RX ORDER — GLUCAGON 1 MG
1 KIT INJECTION
Status: DISCONTINUED | OUTPATIENT
Start: 2024-10-24 | End: 2024-10-26 | Stop reason: HOSPADM

## 2024-10-24 RX ORDER — IBUPROFEN 200 MG
24 TABLET ORAL
Status: DISCONTINUED | OUTPATIENT
Start: 2024-10-24 | End: 2024-10-26 | Stop reason: HOSPADM

## 2024-10-24 RX ORDER — METOPROLOL TARTRATE 25 MG/1
25 TABLET, FILM COATED ORAL 2 TIMES DAILY
Status: DISCONTINUED | OUTPATIENT
Start: 2024-10-25 | End: 2024-10-26 | Stop reason: HOSPADM

## 2024-10-24 RX ORDER — DILTIAZEM HYDROCHLORIDE 5 MG/ML
20 INJECTION INTRAVENOUS
Status: COMPLETED | OUTPATIENT
Start: 2024-10-24 | End: 2024-10-24

## 2024-10-24 RX ORDER — ENTECAVIR 1 MG/1
1 TABLET, FILM COATED ORAL DAILY
COMMUNITY
Start: 2024-10-01

## 2024-10-24 RX ORDER — PANTOPRAZOLE SODIUM 40 MG/1
40 TABLET, DELAYED RELEASE ORAL DAILY
Status: DISCONTINUED | OUTPATIENT
Start: 2024-10-24 | End: 2024-10-26 | Stop reason: HOSPADM

## 2024-10-24 RX ORDER — PROCHLORPERAZINE EDISYLATE 5 MG/ML
5 INJECTION INTRAMUSCULAR; INTRAVENOUS EVERY 6 HOURS PRN
Status: DISCONTINUED | OUTPATIENT
Start: 2024-10-24 | End: 2024-10-26 | Stop reason: HOSPADM

## 2024-10-24 RX ORDER — TALC
6 POWDER (GRAM) TOPICAL NIGHTLY PRN
Status: DISCONTINUED | OUTPATIENT
Start: 2024-10-24 | End: 2024-10-26 | Stop reason: HOSPADM

## 2024-10-24 RX ORDER — HYDRALAZINE HYDROCHLORIDE 25 MG/1
25 TABLET, FILM COATED ORAL 3 TIMES DAILY
Status: DISCONTINUED | OUTPATIENT
Start: 2024-10-24 | End: 2024-10-25

## 2024-10-24 RX ORDER — ACETAMINOPHEN 325 MG/1
650 TABLET ORAL EVERY 4 HOURS PRN
Status: DISCONTINUED | OUTPATIENT
Start: 2024-10-24 | End: 2024-10-26 | Stop reason: HOSPADM

## 2024-10-24 RX ORDER — LIDOCAINE HYDROCHLORIDE 40 MG/ML
SOLUTION TOPICAL
Status: DISCONTINUED | OUTPATIENT
Start: 2024-10-24 | End: 2024-10-24

## 2024-10-24 RX ORDER — ENOXAPARIN SODIUM 100 MG/ML
1 INJECTION SUBCUTANEOUS
Status: COMPLETED | OUTPATIENT
Start: 2024-10-24 | End: 2024-10-24

## 2024-10-24 RX ORDER — DILTIAZEM HCL 1 MG/ML
0-15 INJECTION, SOLUTION INTRAVENOUS CONTINUOUS
Status: DISCONTINUED | OUTPATIENT
Start: 2024-10-24 | End: 2024-10-24

## 2024-10-24 RX ORDER — NALOXONE HCL 0.4 MG/ML
0.02 VIAL (ML) INJECTION
Status: DISCONTINUED | OUTPATIENT
Start: 2024-10-24 | End: 2024-10-26 | Stop reason: HOSPADM

## 2024-10-24 RX ORDER — ALLOPURINOL 100 MG/1
100 TABLET ORAL DAILY
Status: DISCONTINUED | OUTPATIENT
Start: 2024-10-24 | End: 2024-10-26 | Stop reason: HOSPADM

## 2024-10-24 RX ORDER — CETIRIZINE HYDROCHLORIDE 5 MG/1
5 TABLET ORAL DAILY
Status: DISCONTINUED | OUTPATIENT
Start: 2024-10-24 | End: 2024-10-26 | Stop reason: HOSPADM

## 2024-10-24 RX ORDER — OXYCODONE AND ACETAMINOPHEN 5; 325 MG/1; MG/1
1 TABLET ORAL EVERY 4 HOURS PRN
COMMUNITY
Start: 2024-07-26

## 2024-10-24 RX ORDER — DOXAZOSIN 4 MG/1
4 TABLET ORAL NIGHTLY
Status: DISCONTINUED | OUTPATIENT
Start: 2024-10-24 | End: 2024-10-26 | Stop reason: HOSPADM

## 2024-10-24 RX ORDER — METOPROLOL TARTRATE 25 MG/1
25 TABLET, FILM COATED ORAL 4 TIMES DAILY
Status: DISCONTINUED | OUTPATIENT
Start: 2024-10-24 | End: 2024-10-24

## 2024-10-24 RX ORDER — LOSARTAN POTASSIUM 25 MG/1
50 TABLET ORAL DAILY
Status: DISCONTINUED | OUTPATIENT
Start: 2024-10-24 | End: 2024-10-26 | Stop reason: HOSPADM

## 2024-10-24 RX ORDER — IBUPROFEN 200 MG
16 TABLET ORAL
Status: DISCONTINUED | OUTPATIENT
Start: 2024-10-24 | End: 2024-10-26 | Stop reason: HOSPADM

## 2024-10-24 RX ADMIN — DOXAZOSIN 4 MG: 4 TABLET ORAL at 08:10

## 2024-10-24 RX ADMIN — ETOMIDATE 4 MG: 2 INJECTION, SOLUTION INTRAVENOUS at 02:10

## 2024-10-24 RX ADMIN — ETOMIDATE 6 MG: 2 INJECTION, SOLUTION INTRAVENOUS at 02:10

## 2024-10-24 RX ADMIN — APIXABAN 5 MG: 5 TABLET, FILM COATED ORAL at 08:10

## 2024-10-24 RX ADMIN — LIDOCAINE HYDROCHLORIDE 20 MG: 20 INJECTION, SOLUTION INTRAVENOUS at 02:10

## 2024-10-24 RX ADMIN — FUROSEMIDE 20 MG: 10 INJECTION, SOLUTION INTRAMUSCULAR; INTRAVENOUS at 08:10

## 2024-10-24 RX ADMIN — HYDRALAZINE HYDROCHLORIDE 25 MG: 25 TABLET ORAL at 03:10

## 2024-10-24 RX ADMIN — ENOXAPARIN SODIUM 90 MG: 100 INJECTION SUBCUTANEOUS at 12:10

## 2024-10-24 RX ADMIN — DILTIAZEM HYDROCHLORIDE 20 MG: 5 INJECTION INTRAVENOUS at 10:10

## 2024-10-24 RX ADMIN — DILTIAZEM HYDROCHLORIDE 10 MG: 5 INJECTION INTRAVENOUS at 09:10

## 2024-10-24 RX ADMIN — PANTOPRAZOLE SODIUM 40 MG: 40 TABLET, DELAYED RELEASE ORAL at 12:10

## 2024-10-24 RX ADMIN — SODIUM CHLORIDE: 0.9 INJECTION, SOLUTION INTRAVENOUS at 02:10

## 2024-10-24 RX ADMIN — METOPROLOL TARTRATE 25 MG: 25 TABLET, FILM COATED ORAL at 12:10

## 2024-10-24 RX ADMIN — LOSARTAN POTASSIUM 50 MG: 25 TABLET, FILM COATED ORAL at 12:10

## 2024-10-24 RX ADMIN — HYDRALAZINE HYDROCHLORIDE 25 MG: 25 TABLET ORAL at 08:10

## 2024-10-24 RX ADMIN — DILTIAZEM HYDROCHLORIDE 5 MG/HR: 5 INJECTION INTRAVENOUS at 12:10

## 2024-10-24 RX ADMIN — FLUTICASONE PROPIONATE 100 MCG: 50 SPRAY, METERED NASAL at 09:10

## 2024-10-24 RX ADMIN — METOPROLOL TARTRATE 25 MG: 25 TABLET, FILM COATED ORAL at 05:10

## 2024-10-24 RX ADMIN — LIDOCAINE HYDROCHLORIDE 3 ML: 40 SOLUTION TOPICAL at 02:10

## 2024-10-24 RX ADMIN — CETIRIZINE HYDROCHLORIDE 5 MG: 5 TABLET ORAL at 08:10

## 2024-10-24 RX ADMIN — ACETAMINOPHEN 650 MG: 325 TABLET ORAL at 08:10

## 2024-10-24 RX ADMIN — ETOMIDATE 2 MG: 2 INJECTION, SOLUTION INTRAVENOUS at 02:10

## 2024-10-24 RX ADMIN — FUROSEMIDE 20 MG: 10 INJECTION, SOLUTION INTRAMUSCULAR; INTRAVENOUS at 12:10

## 2024-10-25 ENCOUNTER — DOCUMENTATION ONLY (OUTPATIENT)
Dept: CARDIOLOGY | Facility: CLINIC | Age: 74
End: 2024-10-25
Payer: MEDICARE

## 2024-10-25 DIAGNOSIS — R06.02 SOB (SHORTNESS OF BREATH): Primary | ICD-10-CM

## 2024-10-25 LAB
ANION GAP SERPL CALC-SCNC: 10 MMOL/L (ref 8–16)
BASOPHILS # BLD AUTO: 0.1 K/UL (ref 0–0.2)
BASOPHILS NFR BLD: 0.8 % (ref 0–1.9)
BUN SERPL-MCNC: 22 MG/DL (ref 8–23)
CALCIUM SERPL-MCNC: 9.2 MG/DL (ref 8.7–10.5)
CHLORIDE SERPL-SCNC: 107 MMOL/L (ref 95–110)
CHOLEST SERPL-MCNC: 127 MG/DL (ref 120–199)
CHOLEST/HDLC SERPL: 3.1 {RATIO} (ref 2–5)
CO2 SERPL-SCNC: 22 MMOL/L (ref 23–29)
CREAT SERPL-MCNC: 1.5 MG/DL (ref 0.5–1.4)
DIFFERENTIAL METHOD BLD: ABNORMAL
EOSINOPHIL # BLD AUTO: 0.2 K/UL (ref 0–0.5)
EOSINOPHIL NFR BLD: 1.8 % (ref 0–8)
ERYTHROCYTE [DISTWIDTH] IN BLOOD BY AUTOMATED COUNT: 13.6 % (ref 11.5–14.5)
EST. GFR  (NO RACE VARIABLE): 49 ML/MIN/1.73 M^2
GLUCOSE SERPL-MCNC: 110 MG/DL (ref 70–110)
HCT VFR BLD AUTO: 38.3 % (ref 40–54)
HDLC SERPL-MCNC: 41 MG/DL (ref 40–75)
HDLC SERPL: 32.3 % (ref 20–50)
HGB BLD-MCNC: 13.1 G/DL (ref 14–18)
IMM GRANULOCYTES # BLD AUTO: 0.09 K/UL (ref 0–0.04)
IMM GRANULOCYTES NFR BLD AUTO: 0.8 % (ref 0–0.5)
LDLC SERPL CALC-MCNC: 66.4 MG/DL (ref 63–159)
LYMPHOCYTES # BLD AUTO: 1.4 K/UL (ref 1–4.8)
LYMPHOCYTES NFR BLD: 11.9 % (ref 18–48)
MAGNESIUM SERPL-MCNC: 2.2 MG/DL (ref 1.6–2.6)
MCH RBC QN AUTO: 34.7 PG (ref 27–31)
MCHC RBC AUTO-ENTMCNC: 34.2 G/DL (ref 32–36)
MCV RBC AUTO: 102 FL (ref 82–98)
MONOCYTES # BLD AUTO: 0.9 K/UL (ref 0.3–1)
MONOCYTES NFR BLD: 7.9 % (ref 4–15)
NEUTROPHILS # BLD AUTO: 9 K/UL (ref 1.8–7.7)
NEUTROPHILS NFR BLD: 76.8 % (ref 38–73)
NONHDLC SERPL-MCNC: 86 MG/DL
NRBC BLD-RTO: 0 /100 WBC
OHS QRS DURATION: 108 MS
OHS QRS DURATION: 92 MS
OHS QTC CALCULATION: 494 MS
OHS QTC CALCULATION: 495 MS
PLATELET # BLD AUTO: 281 K/UL (ref 150–450)
PMV BLD AUTO: 10.2 FL (ref 9.2–12.9)
POTASSIUM SERPL-SCNC: 3.6 MMOL/L (ref 3.5–5.1)
RBC # BLD AUTO: 3.77 M/UL (ref 4.6–6.2)
SODIUM SERPL-SCNC: 139 MMOL/L (ref 136–145)
TRIGL SERPL-MCNC: 98 MG/DL (ref 30–150)
TROPONIN I SERPL DL<=0.01 NG/ML-MCNC: 0.04 NG/ML (ref 0–0.03)
WBC # BLD AUTO: 11.77 K/UL (ref 3.9–12.7)

## 2024-10-25 PROCEDURE — 93005 ELECTROCARDIOGRAM TRACING: CPT

## 2024-10-25 PROCEDURE — 99900035 HC TECH TIME PER 15 MIN (STAT)

## 2024-10-25 PROCEDURE — 80061 LIPID PANEL: CPT | Performed by: HOSPITALIST

## 2024-10-25 PROCEDURE — 94799 UNLISTED PULMONARY SVC/PX: CPT

## 2024-10-25 PROCEDURE — 25000003 PHARM REV CODE 250: Performed by: INTERNAL MEDICINE

## 2024-10-25 PROCEDURE — 94761 N-INVAS EAR/PLS OXIMETRY MLT: CPT

## 2024-10-25 PROCEDURE — 63600175 PHARM REV CODE 636 W HCPCS: Performed by: INTERNAL MEDICINE

## 2024-10-25 PROCEDURE — 85025 COMPLETE CBC W/AUTO DIFF WBC: CPT | Performed by: HOSPITALIST

## 2024-10-25 PROCEDURE — 25000003 PHARM REV CODE 250: Performed by: HOSPITALIST

## 2024-10-25 PROCEDURE — 27000221 HC OXYGEN, UP TO 24 HOURS

## 2024-10-25 PROCEDURE — 36415 COLL VENOUS BLD VENIPUNCTURE: CPT | Performed by: HOSPITALIST

## 2024-10-25 PROCEDURE — 93010 ELECTROCARDIOGRAM REPORT: CPT | Mod: ,,, | Performed by: INTERNAL MEDICINE

## 2024-10-25 PROCEDURE — 11000001 HC ACUTE MED/SURG PRIVATE ROOM

## 2024-10-25 PROCEDURE — 84484 ASSAY OF TROPONIN QUANT: CPT | Performed by: INTERNAL MEDICINE

## 2024-10-25 PROCEDURE — 83735 ASSAY OF MAGNESIUM: CPT | Performed by: HOSPITALIST

## 2024-10-25 PROCEDURE — 36415 COLL VENOUS BLD VENIPUNCTURE: CPT | Performed by: INTERNAL MEDICINE

## 2024-10-25 PROCEDURE — 80048 BASIC METABOLIC PNL TOTAL CA: CPT | Performed by: HOSPITALIST

## 2024-10-25 PROCEDURE — 63600175 PHARM REV CODE 636 W HCPCS: Performed by: HOSPITALIST

## 2024-10-25 RX ORDER — HYDRALAZINE HYDROCHLORIDE 25 MG/1
50 TABLET, FILM COATED ORAL 3 TIMES DAILY
Status: DISCONTINUED | OUTPATIENT
Start: 2024-10-25 | End: 2024-10-26 | Stop reason: HOSPADM

## 2024-10-25 RX ORDER — DOXYCYCLINE HYCLATE 100 MG
100 TABLET ORAL EVERY 12 HOURS
Status: DISCONTINUED | OUTPATIENT
Start: 2024-10-25 | End: 2024-10-26 | Stop reason: HOSPADM

## 2024-10-25 RX ORDER — FUROSEMIDE 10 MG/ML
20 INJECTION INTRAMUSCULAR; INTRAVENOUS ONCE
Status: COMPLETED | OUTPATIENT
Start: 2024-10-25 | End: 2024-10-25

## 2024-10-25 RX ORDER — FUROSEMIDE 10 MG/ML
20 INJECTION INTRAMUSCULAR; INTRAVENOUS EVERY 12 HOURS
Status: DISCONTINUED | OUTPATIENT
Start: 2024-10-25 | End: 2024-10-26 | Stop reason: HOSPADM

## 2024-10-25 RX ORDER — GUAIFENESIN 600 MG/1
600 TABLET, EXTENDED RELEASE ORAL 2 TIMES DAILY
Status: DISCONTINUED | OUTPATIENT
Start: 2024-10-25 | End: 2024-10-26 | Stop reason: HOSPADM

## 2024-10-25 RX ADMIN — Medication 6 MG: at 08:10

## 2024-10-25 RX ADMIN — FUROSEMIDE 20 MG: 10 INJECTION, SOLUTION INTRAMUSCULAR; INTRAVENOUS at 08:10

## 2024-10-25 RX ADMIN — GUAIFENESIN 600 MG: 600 TABLET, EXTENDED RELEASE ORAL at 08:10

## 2024-10-25 RX ADMIN — METOPROLOL TARTRATE 25 MG: 25 TABLET, FILM COATED ORAL at 08:10

## 2024-10-25 RX ADMIN — ALLOPURINOL 100 MG: 100 TABLET ORAL at 09:10

## 2024-10-25 RX ADMIN — FUROSEMIDE 20 MG: 10 INJECTION, SOLUTION INTRAMUSCULAR; INTRAVENOUS at 05:10

## 2024-10-25 RX ADMIN — APIXABAN 5 MG: 5 TABLET, FILM COATED ORAL at 09:10

## 2024-10-25 RX ADMIN — DOXYCYCLINE HYCLATE 100 MG: 100 TABLET, COATED ORAL at 08:10

## 2024-10-25 RX ADMIN — APIXABAN 5 MG: 5 TABLET, FILM COATED ORAL at 08:10

## 2024-10-25 RX ADMIN — DOXAZOSIN 4 MG: 4 TABLET ORAL at 08:10

## 2024-10-25 RX ADMIN — PANTOPRAZOLE SODIUM 40 MG: 40 TABLET, DELAYED RELEASE ORAL at 09:10

## 2024-10-25 RX ADMIN — HYDRALAZINE HYDROCHLORIDE 25 MG: 25 TABLET ORAL at 04:10

## 2024-10-25 RX ADMIN — ACETAMINOPHEN 650 MG: 325 TABLET ORAL at 08:10

## 2024-10-25 RX ADMIN — LOSARTAN POTASSIUM 50 MG: 25 TABLET, FILM COATED ORAL at 09:10

## 2024-10-25 RX ADMIN — HYDRALAZINE HYDROCHLORIDE 50 MG: 25 TABLET ORAL at 08:10

## 2024-10-25 RX ADMIN — METOPROLOL TARTRATE 25 MG: 25 TABLET, FILM COATED ORAL at 09:10

## 2024-10-25 RX ADMIN — FLUTICASONE PROPIONATE 100 MCG: 50 SPRAY, METERED NASAL at 08:10

## 2024-10-25 RX ADMIN — HYDRALAZINE HYDROCHLORIDE 25 MG: 25 TABLET ORAL at 09:10

## 2024-10-25 RX ADMIN — CETIRIZINE HYDROCHLORIDE 5 MG: 5 TABLET ORAL at 09:10

## 2024-10-25 NOTE — PROGRESS NOTES
"[facility]) nurse navigator notified of HFTCC candidate in need of education and introduction to 4-6 week program.      PT aao x 3 while lying in bed. Introduced self to pt as HFTCC nurse navigator Beena TRACEY    Patient given "[facility] Home Care Guide" which includes "Daily weight and symptom tracker".  Encouraged pt  to review information.      Reviewed the following key points of HFTCC program with pt and family:   1.) Take your medications as directed.    2.) Weight yourself daily   3.) Follow low salt and limited fluid diet.    4.) Stop smoking and start exercising   5.) Go to your appointments and call your team.      Pt reminded to follow Symptom tracker and to call at the onset of symptoms according to tracker.     Reviewed plan for follow up once discharged to include phone calls, in person and virtual visits to assist pt optimizing their heart failure medication regimen and encouraging healthy lifestyle modifications.  Reminded pt that program will assist them over the next 4-6 weeks and then patient will be transferred to long term care provider .  Reminded pt how to contact HFTCC navigator via phone and or via [facility].     Pt instructed appointment will be printed on hospital discharge paperwork. Patient requesting afternoon appointment    Pt also reminded RN will call 48-72 hours after discharge to check on them.     PT  verbalize read back of information given.  Encouraged pt and family to read over information often and contact team with any questions or concerns.     "

## 2024-10-26 VITALS
OXYGEN SATURATION: 95 % | HEIGHT: 72 IN | TEMPERATURE: 98 F | WEIGHT: 194.63 LBS | SYSTOLIC BLOOD PRESSURE: 162 MMHG | RESPIRATION RATE: 18 BRPM | BODY MASS INDEX: 26.36 KG/M2 | DIASTOLIC BLOOD PRESSURE: 76 MMHG | HEART RATE: 75 BPM

## 2024-10-26 PROBLEM — T48.5X5A RHINITIS MEDICAMENTOSA: Status: ACTIVE | Noted: 2024-10-26

## 2024-10-26 PROBLEM — J31.0 RHINITIS MEDICAMENTOSA: Status: ACTIVE | Noted: 2024-10-26

## 2024-10-26 LAB
ANION GAP SERPL CALC-SCNC: 11 MMOL/L (ref 8–16)
BASOPHILS # BLD AUTO: 0.09 K/UL (ref 0–0.2)
BASOPHILS NFR BLD: 0.8 % (ref 0–1.9)
BUN SERPL-MCNC: 28 MG/DL (ref 8–23)
CALCIUM SERPL-MCNC: 8.8 MG/DL (ref 8.7–10.5)
CHLORIDE SERPL-SCNC: 109 MMOL/L (ref 95–110)
CO2 SERPL-SCNC: 23 MMOL/L (ref 23–29)
CREAT SERPL-MCNC: 1.5 MG/DL (ref 0.5–1.4)
DIFFERENTIAL METHOD BLD: ABNORMAL
EOSINOPHIL # BLD AUTO: 0.3 K/UL (ref 0–0.5)
EOSINOPHIL NFR BLD: 2.4 % (ref 0–8)
ERYTHROCYTE [DISTWIDTH] IN BLOOD BY AUTOMATED COUNT: 13.6 % (ref 11.5–14.5)
EST. GFR  (NO RACE VARIABLE): 49 ML/MIN/1.73 M^2
GLUCOSE SERPL-MCNC: 117 MG/DL (ref 70–110)
HCT VFR BLD AUTO: 37 % (ref 40–54)
HGB BLD-MCNC: 12.3 G/DL (ref 14–18)
IMM GRANULOCYTES # BLD AUTO: 0.07 K/UL (ref 0–0.04)
IMM GRANULOCYTES NFR BLD AUTO: 0.6 % (ref 0–0.5)
LYMPHOCYTES # BLD AUTO: 1.4 K/UL (ref 1–4.8)
LYMPHOCYTES NFR BLD: 13.1 % (ref 18–48)
MAGNESIUM SERPL-MCNC: 2 MG/DL (ref 1.6–2.6)
MCH RBC QN AUTO: 34.1 PG (ref 27–31)
MCHC RBC AUTO-ENTMCNC: 33.2 G/DL (ref 32–36)
MCV RBC AUTO: 103 FL (ref 82–98)
MONOCYTES # BLD AUTO: 0.9 K/UL (ref 0.3–1)
MONOCYTES NFR BLD: 8.6 % (ref 4–15)
NEUTROPHILS # BLD AUTO: 8.1 K/UL (ref 1.8–7.7)
NEUTROPHILS NFR BLD: 74.5 % (ref 38–73)
NRBC BLD-RTO: 0 /100 WBC
PLATELET # BLD AUTO: 236 K/UL (ref 150–450)
PMV BLD AUTO: 10.2 FL (ref 9.2–12.9)
POTASSIUM SERPL-SCNC: 3.5 MMOL/L (ref 3.5–5.1)
RBC # BLD AUTO: 3.61 M/UL (ref 4.6–6.2)
SODIUM SERPL-SCNC: 143 MMOL/L (ref 136–145)
WBC # BLD AUTO: 10.86 K/UL (ref 3.9–12.7)

## 2024-10-26 PROCEDURE — 83735 ASSAY OF MAGNESIUM: CPT | Performed by: HOSPITALIST

## 2024-10-26 PROCEDURE — 25000003 PHARM REV CODE 250: Performed by: HOSPITALIST

## 2024-10-26 PROCEDURE — 36415 COLL VENOUS BLD VENIPUNCTURE: CPT | Performed by: HOSPITALIST

## 2024-10-26 PROCEDURE — 80048 BASIC METABOLIC PNL TOTAL CA: CPT | Performed by: HOSPITALIST

## 2024-10-26 PROCEDURE — 25000003 PHARM REV CODE 250: Performed by: INTERNAL MEDICINE

## 2024-10-26 PROCEDURE — 85025 COMPLETE CBC W/AUTO DIFF WBC: CPT | Performed by: HOSPITALIST

## 2024-10-26 PROCEDURE — 63600175 PHARM REV CODE 636 W HCPCS: Performed by: HOSPITALIST

## 2024-10-26 RX ORDER — GUAIFENESIN 600 MG/1
600 TABLET, EXTENDED RELEASE ORAL 2 TIMES DAILY
Qty: 20 TABLET | Refills: 0 | Status: SHIPPED | OUTPATIENT
Start: 2024-10-26

## 2024-10-26 RX ORDER — DOXYCYCLINE HYCLATE 100 MG
100 TABLET ORAL EVERY 12 HOURS
Qty: 12 TABLET | Refills: 0 | Status: SHIPPED | OUTPATIENT
Start: 2024-10-26 | End: 2024-11-01

## 2024-10-26 RX ORDER — POTASSIUM CHLORIDE 20 MEQ/1
20 TABLET, EXTENDED RELEASE ORAL DAILY
Qty: 30 TABLET | Refills: 0 | Status: SHIPPED | OUTPATIENT
Start: 2024-10-26 | End: 2024-10-31

## 2024-10-26 RX ORDER — VITAMIN A 3000 MCG
1 CAPSULE ORAL
Qty: 60 ML | Refills: 0 | Status: SHIPPED | OUTPATIENT
Start: 2024-10-26

## 2024-10-26 RX ORDER — CETIRIZINE HYDROCHLORIDE 5 MG/1
5 TABLET ORAL DAILY
Qty: 30 TABLET | Refills: 0 | Status: SHIPPED | OUTPATIENT
Start: 2024-10-26

## 2024-10-26 RX ORDER — CALCIUM CARBONATE 300MG(750)
400 TABLET,CHEWABLE ORAL DAILY
Qty: 30 TABLET | Refills: 0 | Status: SHIPPED | OUTPATIENT
Start: 2024-10-26 | End: 2024-10-31

## 2024-10-26 RX ORDER — FUROSEMIDE 20 MG/1
40 TABLET ORAL DAILY
Qty: 60 TABLET | Refills: 0 | Status: SHIPPED | OUTPATIENT
Start: 2024-10-26 | End: 2024-10-31

## 2024-10-26 RX ORDER — FLUTICASONE PROPIONATE 50 MCG
2 SPRAY, SUSPENSION (ML) NASAL NIGHTLY
Qty: 16 G | Refills: 0 | Status: SHIPPED | OUTPATIENT
Start: 2024-10-26

## 2024-10-26 RX ORDER — METOPROLOL TARTRATE 25 MG/1
25 TABLET, FILM COATED ORAL 2 TIMES DAILY
Qty: 60 TABLET | Refills: 0 | Status: SHIPPED | OUTPATIENT
Start: 2024-10-26 | End: 2024-10-31

## 2024-10-26 RX ADMIN — DOXYCYCLINE HYCLATE 100 MG: 100 TABLET, COATED ORAL at 09:10

## 2024-10-26 RX ADMIN — FUROSEMIDE 20 MG: 10 INJECTION, SOLUTION INTRAMUSCULAR; INTRAVENOUS at 09:10

## 2024-10-26 RX ADMIN — ALLOPURINOL 100 MG: 100 TABLET ORAL at 09:10

## 2024-10-26 RX ADMIN — CETIRIZINE HYDROCHLORIDE 5 MG: 5 TABLET ORAL at 09:10

## 2024-10-26 RX ADMIN — METOPROLOL TARTRATE 25 MG: 25 TABLET, FILM COATED ORAL at 09:10

## 2024-10-26 RX ADMIN — GUAIFENESIN 600 MG: 600 TABLET, EXTENDED RELEASE ORAL at 09:10

## 2024-10-26 RX ADMIN — PANTOPRAZOLE SODIUM 40 MG: 40 TABLET, DELAYED RELEASE ORAL at 09:10

## 2024-10-26 RX ADMIN — HYDRALAZINE HYDROCHLORIDE 50 MG: 25 TABLET ORAL at 09:10

## 2024-10-26 RX ADMIN — LOSARTAN POTASSIUM 50 MG: 25 TABLET, FILM COATED ORAL at 09:10

## 2024-10-26 RX ADMIN — APIXABAN 5 MG: 5 TABLET, FILM COATED ORAL at 09:10

## 2024-10-28 ENCOUNTER — DOCUMENTATION ONLY (OUTPATIENT)
Dept: CARDIOLOGY | Facility: CLINIC | Age: 74
End: 2024-10-28
Payer: MEDICARE

## 2024-10-30 ENCOUNTER — TELEPHONE (OUTPATIENT)
Dept: ELECTROPHYSIOLOGY | Facility: CLINIC | Age: 74
End: 2024-10-30
Payer: MEDICARE

## 2024-10-30 DIAGNOSIS — I48.0 PAROXYSMAL ATRIAL FIBRILLATION WITH RVR: Primary | ICD-10-CM

## 2024-10-31 ENCOUNTER — DOCUMENTATION ONLY (OUTPATIENT)
Dept: CARDIOLOGY | Facility: CLINIC | Age: 74
End: 2024-10-31

## 2024-10-31 ENCOUNTER — TELEPHONE (OUTPATIENT)
Dept: CARDIOLOGY | Facility: CLINIC | Age: 74
End: 2024-10-31
Payer: MEDICARE

## 2024-10-31 ENCOUNTER — OFFICE VISIT (OUTPATIENT)
Dept: CARDIOLOGY | Facility: CLINIC | Age: 74
End: 2024-10-31
Payer: MEDICARE

## 2024-10-31 ENCOUNTER — LAB VISIT (OUTPATIENT)
Dept: LAB | Facility: HOSPITAL | Age: 74
End: 2024-10-31
Payer: MEDICARE

## 2024-10-31 VITALS
RESPIRATION RATE: 22 BRPM | OXYGEN SATURATION: 94 % | WEIGHT: 191.56 LBS | HEART RATE: 123 BPM | BODY MASS INDEX: 25.95 KG/M2 | HEIGHT: 72 IN | DIASTOLIC BLOOD PRESSURE: 70 MMHG | SYSTOLIC BLOOD PRESSURE: 110 MMHG

## 2024-10-31 DIAGNOSIS — F17.210 TOBACCO DEPENDENCE DUE TO CIGARETTES: ICD-10-CM

## 2024-10-31 DIAGNOSIS — I35.0 NONRHEUMATIC AORTIC VALVE STENOSIS: ICD-10-CM

## 2024-10-31 DIAGNOSIS — D53.9 MACROCYTIC ANEMIA: ICD-10-CM

## 2024-10-31 DIAGNOSIS — I50.9 ACUTE CONGESTIVE HEART FAILURE, UNSPECIFIED HEART FAILURE TYPE: Primary | ICD-10-CM

## 2024-10-31 DIAGNOSIS — N18.32 STAGE 3B CHRONIC KIDNEY DISEASE: ICD-10-CM

## 2024-10-31 DIAGNOSIS — I10 ESSENTIAL HYPERTENSION: ICD-10-CM

## 2024-10-31 DIAGNOSIS — R06.02 SOB (SHORTNESS OF BREATH): ICD-10-CM

## 2024-10-31 DIAGNOSIS — I48.0 PAROXYSMAL ATRIAL FIBRILLATION WITH RVR: ICD-10-CM

## 2024-10-31 LAB
ALBUMIN SERPL BCP-MCNC: 3.7 G/DL (ref 3.5–5.2)
ALP SERPL-CCNC: 77 U/L (ref 40–150)
ALT SERPL W/O P-5'-P-CCNC: 20 U/L (ref 10–44)
ANION GAP SERPL CALC-SCNC: 9 MMOL/L (ref 8–16)
AST SERPL-CCNC: 18 U/L (ref 10–40)
BASOPHILS # BLD AUTO: 0.14 K/UL (ref 0–0.2)
BASOPHILS NFR BLD: 1.3 % (ref 0–1.9)
BILIRUB SERPL-MCNC: 0.4 MG/DL (ref 0.1–1)
BNP SERPL-MCNC: 986 PG/ML (ref 0–99)
BUN SERPL-MCNC: 32 MG/DL (ref 8–23)
CALCIUM SERPL-MCNC: 8.6 MG/DL (ref 8.7–10.5)
CHLORIDE SERPL-SCNC: 111 MMOL/L (ref 95–110)
CO2 SERPL-SCNC: 20 MMOL/L (ref 23–29)
CREAT SERPL-MCNC: 2.2 MG/DL (ref 0.5–1.4)
DIFFERENTIAL METHOD BLD: ABNORMAL
EOSINOPHIL # BLD AUTO: 0.3 K/UL (ref 0–0.5)
EOSINOPHIL NFR BLD: 2.9 % (ref 0–8)
ERYTHROCYTE [DISTWIDTH] IN BLOOD BY AUTOMATED COUNT: 13.6 % (ref 11.5–14.5)
EST. GFR  (NO RACE VARIABLE): 31 ML/MIN/1.73 M^2
GLUCOSE SERPL-MCNC: 132 MG/DL (ref 70–110)
HCT VFR BLD AUTO: 37.4 % (ref 40–54)
HGB BLD-MCNC: 12.9 G/DL (ref 14–18)
IMM GRANULOCYTES # BLD AUTO: 0.08 K/UL (ref 0–0.04)
IMM GRANULOCYTES NFR BLD AUTO: 0.7 % (ref 0–0.5)
LYMPHOCYTES # BLD AUTO: 1.6 K/UL (ref 1–4.8)
LYMPHOCYTES NFR BLD: 14.5 % (ref 18–48)
MAGNESIUM SERPL-MCNC: 2.3 MG/DL (ref 1.6–2.6)
MCH RBC QN AUTO: 35.7 PG (ref 27–31)
MCHC RBC AUTO-ENTMCNC: 34.5 G/DL (ref 32–36)
MCV RBC AUTO: 104 FL (ref 82–98)
MONOCYTES # BLD AUTO: 0.7 K/UL (ref 0.3–1)
MONOCYTES NFR BLD: 6.6 % (ref 4–15)
NEUTROPHILS # BLD AUTO: 8.3 K/UL (ref 1.8–7.7)
NEUTROPHILS NFR BLD: 74 % (ref 38–73)
NRBC BLD-RTO: 0 /100 WBC
PHOSPHATE SERPL-MCNC: 4.5 MG/DL (ref 2.7–4.5)
PLATELET # BLD AUTO: 272 K/UL (ref 150–450)
PMV BLD AUTO: 10.1 FL (ref 9.2–12.9)
POTASSIUM SERPL-SCNC: 4.4 MMOL/L (ref 3.5–5.1)
PROT SERPL-MCNC: 6.5 G/DL (ref 6–8.4)
RBC # BLD AUTO: 3.61 M/UL (ref 4.6–6.2)
SODIUM SERPL-SCNC: 140 MMOL/L (ref 136–145)
WBC # BLD AUTO: 11.19 K/UL (ref 3.9–12.7)

## 2024-10-31 PROCEDURE — 83880 ASSAY OF NATRIURETIC PEPTIDE: CPT

## 2024-10-31 PROCEDURE — 80053 COMPREHEN METABOLIC PANEL: CPT

## 2024-10-31 PROCEDURE — 85025 COMPLETE CBC W/AUTO DIFF WBC: CPT

## 2024-10-31 PROCEDURE — 84100 ASSAY OF PHOSPHORUS: CPT

## 2024-10-31 PROCEDURE — 83735 ASSAY OF MAGNESIUM: CPT

## 2024-10-31 PROCEDURE — 36415 COLL VENOUS BLD VENIPUNCTURE: CPT

## 2024-10-31 PROCEDURE — 99999 PR PBB SHADOW E&M-EST. PATIENT-LVL V: CPT | Mod: PBBFAC,,,

## 2024-10-31 RX ORDER — FUROSEMIDE 20 MG/1
40 TABLET ORAL 2 TIMES DAILY
Qty: 60 TABLET | Refills: 11 | Status: SHIPPED | OUTPATIENT
Start: 2024-10-31 | End: 2025-10-31

## 2024-10-31 RX ORDER — CALCIUM CARBONATE 300MG(750)
400 TABLET,CHEWABLE ORAL DAILY
Qty: 30 TABLET | Refills: 11 | Status: SHIPPED | OUTPATIENT
Start: 2024-10-31

## 2024-10-31 RX ORDER — LOSARTAN POTASSIUM 50 MG/1
50 TABLET ORAL DAILY
Qty: 90 TABLET | Refills: 3 | Status: SHIPPED | OUTPATIENT
Start: 2024-10-31 | End: 2025-10-31

## 2024-10-31 RX ORDER — METOPROLOL TARTRATE 25 MG/1
50 TABLET, FILM COATED ORAL 2 TIMES DAILY
Qty: 120 TABLET | Refills: 11 | Status: SHIPPED | OUTPATIENT
Start: 2024-10-31 | End: 2025-10-31

## 2024-10-31 RX ORDER — POTASSIUM CHLORIDE 20 MEQ/1
20 TABLET, EXTENDED RELEASE ORAL DAILY
Qty: 30 TABLET | Refills: 0 | Status: SHIPPED | OUTPATIENT
Start: 2024-10-31

## 2024-10-31 RX ORDER — FUROSEMIDE 20 MG/1
40 TABLET ORAL DAILY
Qty: 60 TABLET | Refills: 11 | Status: SHIPPED | OUTPATIENT
Start: 2024-10-31 | End: 2024-10-31

## 2024-11-01 NOTE — PROGRESS NOTES
"HF TCC Provider Note (Follow-up) Consult Note      HPI:  Patient can walk without limitatoin   Patient sleeps on 2 number of pillows   Patient wakes up SOB, has to get out of bed, associated cough, sputum and color-denies   Palpitations -  denies   Dizzy, light-headed, pre-syncope or syncope   Since discharge frequency of performing weights, home weight and weight change-states last home weight was 188 yesterday   Other information felt pertinent to HPI: 73 yo w/ aortic stenosis, chronic rhinosinusitis, cigarette smoker, gout, HTN, BPH, chronic HBV infection  He recently lost his wife to leukemia and has been under a lot of stress "but I am doing all right".  Accompanied today by daughter and granddaughter.  He presented to ED after a week of URI/LRI symptoms, started w/ cough/congestion "I felt like I was getting a sinus infection", picked up some OTC "Cough/Severe Cold/Congestion" medication at [facility] and took according to label, felt better but yesterday felt worse and came to ED today for assessment.  Never noted any CP, palpitations, lightheadedness, syncope etc.  Was found in ED to have apparently new onset AF w/ RVR.  No ACS.  Dr Agrawal consulted from ED and brought pt to lab for MARILYNN and successful DCCV.  Pt was initially on diltiazem drip and destined for ICU but after this was deemed appropriate for tele zuleta.  Currently sitting up feeling better.  Started on Eliquis and Lovenox, metoprolol, and likely can go home tomorrow.  He sees a PCP who had wanted him to f/u w/ cards for his AS as he'd fallen out of touch w/ his previous cardiologist, and he says he will start seeing Dr Agrawal for this. Patient being followed in HFTCC for fluid volume and GDMT management.     PHYSICAL:   Vitals:    11/06/24 1300   BP: 102/70   Pulse: (!) 119   Resp: 18      @[id number](3)@    JVD: yes, clavicle   Heart rhythm: irregular  Cardiac murmur: Yes     S3: no  S4: no  Lungs: clear  Hepatojugular reflux: yes, " midneck  Edema: yes, trace BLE         MARILYNN 10/24:     Findings/Key Components:   Normal biventricular size/fxn, LVEF 55%.  Normal wall motion.  Mod Ca++ AS, SASHA 1.4cm2 by planimetry.  Mod AI.  MAC with mod MR  Mild TR  Biatrial enlargement  No LA/SURAJ thrombus     Successful DCCV AF->NSR 78 BPM 200J x1.    ASSESSMENT: HFpEF     PLAN:      Patient Instructions:   Instruct the patient to notify this clinic if HH, a physician or an advanced care provider wants to change medication one of their HF medications   Activity and Diet restrictions:   Recommend 2-3 gram sodium restriction and 1500cc- 2000cc fluid restriction.  Encourage physical activity with graded exercise program.  Requested patient to weigh themselves daily, and to notify us if their weight increases by more than 3 lbs in 1 day or 5 lbs in 3 days.    Assigned dry weight on home scale: 188 on home scale. 188 today on clinic scale, down from 191 last visit  Medication changes (include current dose and changed dose): Continue metoprolol 50mg BID and continue losartan 50mg daily. Patient states he is no longer having palpitations and feels better than he has in a long time. Patient currently on eliquis. Increase lasix to 80mg BID for further fluid volume management. Patient instructed to hold Magnesium supplement at this time due to slightly elevated magnesium level.  Upcoming labs and date anticipated: RTC in one week for labs and follow up appointment.  Other diagnostic tests ordered: Follow with gen cards 11/20. Patient states appointment with EP on 11/12.

## 2024-11-06 ENCOUNTER — OFFICE VISIT (OUTPATIENT)
Dept: CARDIOLOGY | Facility: CLINIC | Age: 74
End: 2024-11-06
Payer: MEDICARE

## 2024-11-06 ENCOUNTER — PATIENT MESSAGE (OUTPATIENT)
Dept: CARDIOLOGY | Facility: CLINIC | Age: 74
End: 2024-11-06

## 2024-11-06 ENCOUNTER — LAB VISIT (OUTPATIENT)
Dept: LAB | Facility: HOSPITAL | Age: 74
End: 2024-11-06
Payer: MEDICARE

## 2024-11-06 ENCOUNTER — DOCUMENTATION ONLY (OUTPATIENT)
Dept: CARDIOLOGY | Facility: CLINIC | Age: 74
End: 2024-11-06

## 2024-11-06 VITALS
OXYGEN SATURATION: 95 % | WEIGHT: 188.5 LBS | BODY MASS INDEX: 25.53 KG/M2 | HEART RATE: 119 BPM | SYSTOLIC BLOOD PRESSURE: 102 MMHG | DIASTOLIC BLOOD PRESSURE: 70 MMHG | RESPIRATION RATE: 18 BRPM | HEIGHT: 72 IN

## 2024-11-06 DIAGNOSIS — F17.210 TOBACCO DEPENDENCE DUE TO CIGARETTES: ICD-10-CM

## 2024-11-06 DIAGNOSIS — I48.0 PAROXYSMAL ATRIAL FIBRILLATION WITH RVR: ICD-10-CM

## 2024-11-06 DIAGNOSIS — I50.9 ACUTE CONGESTIVE HEART FAILURE, UNSPECIFIED HEART FAILURE TYPE: Primary | ICD-10-CM

## 2024-11-06 DIAGNOSIS — I10 ESSENTIAL HYPERTENSION: ICD-10-CM

## 2024-11-06 DIAGNOSIS — R06.02 SOB (SHORTNESS OF BREATH): ICD-10-CM

## 2024-11-06 DIAGNOSIS — D53.9 MACROCYTIC ANEMIA: ICD-10-CM

## 2024-11-06 DIAGNOSIS — N18.32 STAGE 3B CHRONIC KIDNEY DISEASE: ICD-10-CM

## 2024-11-06 LAB
ALBUMIN SERPL BCP-MCNC: 3.9 G/DL (ref 3.5–5.2)
ALP SERPL-CCNC: 75 U/L (ref 40–150)
ALT SERPL W/O P-5'-P-CCNC: 14 U/L (ref 10–44)
ANION GAP SERPL CALC-SCNC: 10 MMOL/L (ref 8–16)
AST SERPL-CCNC: 17 U/L (ref 10–40)
BILIRUB SERPL-MCNC: 0.4 MG/DL (ref 0.1–1)
BNP SERPL-MCNC: 670 PG/ML (ref 0–99)
BUN SERPL-MCNC: 44 MG/DL (ref 8–23)
CALCIUM SERPL-MCNC: 8.8 MG/DL (ref 8.7–10.5)
CHLORIDE SERPL-SCNC: 107 MMOL/L (ref 95–110)
CO2 SERPL-SCNC: 23 MMOL/L (ref 23–29)
CREAT SERPL-MCNC: 2.3 MG/DL (ref 0.5–1.4)
EST. GFR  (NO RACE VARIABLE): 29 ML/MIN/1.73 M^2
GLUCOSE SERPL-MCNC: 96 MG/DL (ref 70–110)
MAGNESIUM SERPL-MCNC: 2.7 MG/DL (ref 1.6–2.6)
POTASSIUM SERPL-SCNC: 4.1 MMOL/L (ref 3.5–5.1)
PROT SERPL-MCNC: 6.8 G/DL (ref 6–8.4)
SODIUM SERPL-SCNC: 140 MMOL/L (ref 136–145)

## 2024-11-06 PROCEDURE — 1159F MED LIST DOCD IN RCRD: CPT | Mod: CPTII,S$GLB,,

## 2024-11-06 PROCEDURE — 1160F RVW MEDS BY RX/DR IN RCRD: CPT | Mod: CPTII,S$GLB,,

## 2024-11-06 PROCEDURE — 3074F SYST BP LT 130 MM HG: CPT | Mod: CPTII,S$GLB,,

## 2024-11-06 PROCEDURE — 3078F DIAST BP <80 MM HG: CPT | Mod: CPTII,S$GLB,,

## 2024-11-06 PROCEDURE — 1111F DSCHRG MED/CURRENT MED MERGE: CPT | Mod: CPTII,S$GLB,,

## 2024-11-06 PROCEDURE — 99999 PR PBB SHADOW E&M-EST. PATIENT-LVL V: CPT | Mod: PBBFAC,,,

## 2024-11-06 PROCEDURE — 1101F PT FALLS ASSESS-DOCD LE1/YR: CPT | Mod: CPTII,S$GLB,,

## 2024-11-06 PROCEDURE — 80053 COMPREHEN METABOLIC PANEL: CPT

## 2024-11-06 PROCEDURE — 99214 OFFICE O/P EST MOD 30 MIN: CPT | Mod: S$GLB,,,

## 2024-11-06 PROCEDURE — 4010F ACE/ARB THERAPY RXD/TAKEN: CPT | Mod: CPTII,S$GLB,,

## 2024-11-06 PROCEDURE — 3288F FALL RISK ASSESSMENT DOCD: CPT | Mod: CPTII,S$GLB,,

## 2024-11-06 PROCEDURE — 83735 ASSAY OF MAGNESIUM: CPT

## 2024-11-06 PROCEDURE — 83880 ASSAY OF NATRIURETIC PEPTIDE: CPT

## 2024-11-06 PROCEDURE — 3008F BODY MASS INDEX DOCD: CPT | Mod: CPTII,S$GLB,,

## 2024-11-06 PROCEDURE — 1126F AMNT PAIN NOTED NONE PRSNT: CPT | Mod: CPTII,S$GLB,,

## 2024-11-06 RX ORDER — FUROSEMIDE 40 MG/1
80 TABLET ORAL 2 TIMES DAILY
Qty: 120 TABLET | Refills: 11 | Status: SHIPPED | OUTPATIENT
Start: 2024-11-06 | End: 2025-11-06

## 2024-11-06 NOTE — PROGRESS NOTES
Attempted to call patient to notify patient to hold magnesium for now due to elevated mag level. Left message requesting callback on voicemail. Message also sent via [facility].

## 2024-11-06 NOTE — PATIENT INSTRUCTIONS
Activity and Diet restrictions:   Recommend 2-3 gram sodium restriction and 1500cc- 2000cc fluid restriction.  Call clinic for increased shortness of breath, or increased swelling.  Weigh yourself every day and call the office if your weight increases by more than 3 lbs in 1 day or 5 lbs in 3 days.     Return to clinic for labs and follow up appointment in one week.      Increase to 80mg lasix twice a day.

## 2024-11-06 NOTE — PROGRESS NOTES
"HF TCC Provider Note (Follow-up) Consult Note      HPI:  Patient can walk without limitatoin   Patient sleeps on 2 number of pillows   Patient wakes up SOB, has to get out of bed, associated cough, sputum and color-denies   Palpitations -  denies   Dizzy, light-headed, pre-syncope or syncope-denies   Since discharge frequency of performing weights, home weight and weight change-states last home weight was 190lbs, not weighing self every day    Other information felt pertinent to HPI: 73 yo w/ aortic stenosis, chronic rhinosinusitis, cigarette smoker, gout, HTN, BPH, chronic HBV infection  He recently lost his wife to leukemia and has been under a lot of stress "but I am doing all right".  Accompanied today by daughter and granddaughter.  He presented to ED after a week of URI/LRI symptoms, started w/ cough/congestion "I felt like I was getting a sinus infection", picked up some OTC "Cough/Severe Cold/Congestion" medication at [facility] and took according to label, felt better but yesterday felt worse and came to ED today for assessment.  Never noted any CP, palpitations, lightheadedness, syncope etc.  Was found in ED to have apparently new onset AF w/ RVR.  No ACS.  Dr Agrawal consulted from ED and brought pt to lab for MARILYNN and successful DCCV.  Pt was initially on diltiazem drip and destined for ICU but after this was deemed appropriate for tele zuleta.  Currently sitting up feeling better.  Started on Eliquis and Lovenox, metoprolol, and likely can go home tomorrow.  He sees a PCP who had wanted him to f/u w/ cards for his AS as he'd fallen out of touch w/ his previous cardiologist, and he says he will start seeing Dr Agrawal for this. Patient being followed in TCC for fluid volume and GDMT management.     PHYSICAL:   Vitals:    11/13/24 1346   BP: 118/76   Pulse: (!) 116          JVD: no  Heart rhythm: irregular  Cardiac murmur: Yes     S3: no  S4: no  Lungs: clear  Hepatojugular reflux: yes, 1-2cm  Edema: no "        MARILYNN 10/24:     Findings/Key Components:   Normal biventricular size/fxn, LVEF 55%.  Normal wall motion.  Mod Ca++ AS, SASHA 1.4cm2 by planimetry.  Mod AI.  MAC with mod MR  Mild TR  Biatrial enlargement  No LA/SURAJ thrombus     Successful DCCV AF->NSR 78 BPM 200J x1.    ASSESSMENT: HFpEF     PLAN:      Patient Instructions:   Instruct the patient to notify this clinic if HH, a physician or an advanced care provider wants to change medication one of their HF medications   Activity and Diet restrictions:   Recommend 2-3 gram sodium restriction and 1500cc- 2000cc fluid restriction.  Encourage physical activity with graded exercise program.  Requested patient to weigh themselves daily, and to notify us if their weight increases by more than 3 lbs in 1 day or 5 lbs in 3 days.    Assigned dry weight on home scale: 190 on home scale. 193 today on clinic scale  Medication changes (include current dose and changed dose): Continue metoprolol 50mg BID and continue losartan 50mg daily. Decrease lasix to 40mg BID with prn 40 in afternoon for CHF symptoms due to maintained elevated creatinine.  Upcoming labs and date anticipated: RTC in two weeks for labs and follow up appointment.  Other diagnostic tests ordered: Follow with gen cards 11/20. Patient states met with EP yesterday, medication adjustment made and planning cardioversion to be followed by ablation. Ambulatory referral placed to nephrology for follow up on increased creatinine levels.

## 2024-11-12 ENCOUNTER — OFFICE VISIT (OUTPATIENT)
Dept: ELECTROPHYSIOLOGY | Facility: CLINIC | Age: 74
End: 2024-11-12
Payer: MEDICARE

## 2024-11-12 ENCOUNTER — HOSPITAL ENCOUNTER (OUTPATIENT)
Dept: CARDIOLOGY | Facility: CLINIC | Age: 74
Discharge: HOME | End: 2024-11-12
Payer: MEDICARE

## 2024-11-12 VITALS
HEART RATE: 116 BPM | HEIGHT: 72 IN | DIASTOLIC BLOOD PRESSURE: 80 MMHG | WEIGHT: 197.31 LBS | BODY MASS INDEX: 26.73 KG/M2 | SYSTOLIC BLOOD PRESSURE: 118 MMHG

## 2024-11-12 DIAGNOSIS — I48.0 PAROXYSMAL ATRIAL FIBRILLATION WITH RVR: ICD-10-CM

## 2024-11-12 DIAGNOSIS — I48.0 PAROXYSMAL ATRIAL FIBRILLATION WITH RVR: Primary | ICD-10-CM

## 2024-11-12 DIAGNOSIS — I48.3 TYPICAL ATRIAL FLUTTER: ICD-10-CM

## 2024-11-12 LAB
OHS QRS DURATION: 86 MS
OHS QTC CALCULATION: 508 MS

## 2024-11-12 PROCEDURE — 1126F AMNT PAIN NOTED NONE PRSNT: CPT | Mod: CPTII,S$GLB,, | Performed by: INTERNAL MEDICINE

## 2024-11-12 PROCEDURE — 3008F BODY MASS INDEX DOCD: CPT | Mod: CPTII,S$GLB,, | Performed by: INTERNAL MEDICINE

## 2024-11-12 PROCEDURE — 99999 PR PBB SHADOW E&M-EST. PATIENT-LVL IV: CPT | Mod: PBBFAC,,, | Performed by: INTERNAL MEDICINE

## 2024-11-12 PROCEDURE — 3074F SYST BP LT 130 MM HG: CPT | Mod: CPTII,S$GLB,, | Performed by: INTERNAL MEDICINE

## 2024-11-12 PROCEDURE — 3288F FALL RISK ASSESSMENT DOCD: CPT | Mod: CPTII,S$GLB,, | Performed by: INTERNAL MEDICINE

## 2024-11-12 PROCEDURE — 93010 ELECTROCARDIOGRAM REPORT: CPT | Mod: S$GLB,,, | Performed by: INTERNAL MEDICINE

## 2024-11-12 PROCEDURE — 1101F PT FALLS ASSESS-DOCD LE1/YR: CPT | Mod: CPTII,S$GLB,, | Performed by: INTERNAL MEDICINE

## 2024-11-12 PROCEDURE — 93005 ELECTROCARDIOGRAM TRACING: CPT | Mod: S$GLB,,, | Performed by: INTERNAL MEDICINE

## 2024-11-12 PROCEDURE — 1111F DSCHRG MED/CURRENT MED MERGE: CPT | Mod: CPTII,S$GLB,, | Performed by: INTERNAL MEDICINE

## 2024-11-12 PROCEDURE — 3079F DIAST BP 80-89 MM HG: CPT | Mod: CPTII,S$GLB,, | Performed by: INTERNAL MEDICINE

## 2024-11-12 PROCEDURE — 1159F MED LIST DOCD IN RCRD: CPT | Mod: CPTII,S$GLB,, | Performed by: INTERNAL MEDICINE

## 2024-11-12 PROCEDURE — 4010F ACE/ARB THERAPY RXD/TAKEN: CPT | Mod: CPTII,S$GLB,, | Performed by: INTERNAL MEDICINE

## 2024-11-12 PROCEDURE — 99204 OFFICE O/P NEW MOD 45 MIN: CPT | Mod: S$GLB,,, | Performed by: INTERNAL MEDICINE

## 2024-11-12 RX ORDER — FLECAINIDE ACETATE 100 MG/1
100 TABLET ORAL 2 TIMES DAILY
Qty: 60 TABLET | Refills: 11 | Status: SHIPPED | OUTPATIENT
Start: 2024-11-12 | End: 2025-11-07

## 2024-11-12 NOTE — PROGRESS NOTES
Subjective   Patient ID:  Nader Clarke is a 74 y.o. male who presents for evaluation of Atrial Fibrillation      74 yoM referred for AF management. He has AF incidentally discovered and was referred to cardiology. He underwent MARILYNN/CV and was started on eliquis and lopressor. He was normal EF. He was in SR the next day but was back in AF one week later. Today he is in AFL. No syncope or near syncope. Fatigue and MEZA.     MARILYNN/CV 10/24/24:  Normal biventricular size/fxn, LVEF 55%.  Normal wall motion.  Mod Ca++ AS, SASHA 1.4cm2 by planimetry.  Mod AI.  MAC with mod MR  Mild TR  Biatrial enlargement  No LA/SURAJ thrombus     Successful DCCV AF->NSR 78 BPM 200J x1.    My interpretation of the ECG is:  AFL 2:1    Past Medical History:  No date: Arthritis of big toe      Comment:  RIGHT FOOT BIG TOE  No date: Chronic hepatitis B  No date: Chronic rhinosinusitis  No date: Gout  No date: Hypertension  No date: Vitamin D deficiency    Past Surgical History:  No date: BALLOON SINUPLASTY OF PARANASAL SINUS  10/24/2024: CARDIOVERSION; N/A      Comment:  Procedure: Cardioversion;  Surgeon: Teo Agrawal MD;  Location: [facility] CATH LAB;  Service: Cardiology;                 Laterality: N/A;  No date: CARPUL TUNNEL      Comment:  RIGHT HAND  No date: NERVE REPAIR      Comment:  RIGHT ARM  No date: RIGHT KNEE REPLACEMENT 2015  10/24/2024: TRANSESOPHAGEAL ECHOCARDIOGRAM WITH POSSIBLE   CARDIOVERSION (MARILYNN W/ POSS CARDIOVERSION); N/A      Comment:  Procedure: Transesophageal echo (MARILYNN) intra-procedure                log documentation;  Surgeon: Teo Agrawal MD;                 Location: [facility] CATH LAB;  Service: Cardiology;                 Laterality: N/A;    Social History    Socioeconomic History      Marital status:     Tobacco Use      Smoking status: Former        Packs/day: 0.00        Types: Cigars, Cigarettes        Quit date: 2016        Years since quittin.3      Smokeless tobacco:  Former    Substance and Sexual Activity      Alcohol use: Yes        Alcohol/week: 3.0 standard drinks of alcohol        Types: 3 Cans of beer per week      Drug use: No      Sexual activity: Yes        Partners: Female    Social Drivers of Health  Financial Resource Strain: Medium Risk (11/11/2024)      Overall Financial Resource Strain (CARDIA)          Difficulty of Paying Living Expenses: Somewhat hard  Food Insecurity: No Food Insecurity (11/11/2024)      Hunger Vital Sign          Worried About Running Out of Food in the Last Year: Never true          Ran Out of Food in the Last Year: Never true  Transportation Needs: No Transportation Needs (10/26/2024)      TRANSPORTATION NEEDS          Transportation : No  Physical Activity: Sufficiently Active (11/11/2024)      Exercise Vital Sign          Days of Exercise per Week: 7 days          Minutes of Exercise per Session: 30 min  Stress: No Stress Concern Present (11/11/2024)      [employer] - Occupational Stress Questionnaire          Feeling of Stress : Only a little  Housing Stability: Low Risk  (11/11/2024)      Housing Stability Vital Sign          Unable to Pay for Housing in the Last Year: No          Homeless in the Last Year: No    No family history on file.      Current Outpatient Medications:  allopurinol (ZYLOPRIM) 100 MG tablet, Take 100 mg by mouth Daily., Disp: , Rfl:   apixaban (ELIQUIS) 5 mg Tab, Take 1 tablet (5 mg total) by mouth 2 (two) times daily. To prevent blood clots in heart and strokes, Disp: 60 tablet, Rfl: 0  ascorbic Acid (VITAMIN C) 500 mg CpSR, Take 500 mg by mouth once daily., Disp: , Rfl:   cetirizine (ZYRTEC) 5 MG tablet, Take 1 tablet (5 mg total) by mouth once daily. To help w/ allergy/sinus, Disp: 30 tablet, Rfl: 0  cyanocobalamin (VITAMIN B-12) 100 MCG tablet, Take 100 mcg by mouth once daily., Disp: , Rfl:   doxazosin (CARDURA) 8 MG Tab, Take 8 mg by mouth every evening., Disp: , Rfl:  3  entecavir (BARACLUDE) 1 MG Tab, Take 1 mg by mouth once daily., Disp: , Rfl:   ergocalciferol (ERGOCALCIFEROL) 50,000 unit Cap, Take 1 capsule (50,000 Units total) by mouth every 7 days., Disp: 12 capsule, Rfl: 3  ferrous sulfate (SLOW RELEASE IRON) 142 mg (45 mg iron) TbSR, Take 1 tablet by mouth once daily., Disp: , Rfl:   fluticasone propionate (FLONASE) 50 mcg/actuation nasal spray, 2 sprays (100 mcg total) by Each Nostril route every evening. To help w/ allergy/sinus, Disp: 16 g, Rfl: 0  furosemide (LASIX) 40 MG tablet, Take 2 tablets (80 mg total) by mouth 2 (two) times daily., Disp: 120 tablet, Rfl: 11  guaiFENesin (MUCINEX) 600 mg 12 hr tablet, Take 1 tablet (600 mg total) by mouth 2 (two) times daily. To help clear sinus and chest congestion, Disp: 20 tablet, Rfl: 0  hydrALAZINE (APRESOLINE) 50 MG tablet, TAKE 1 TABLET BY MOUTH THREE TIMES A DAY HOLD IF BLOOD PRESSURE IS LESS THAN 130 SYSTOLIC., Disp: , Rfl: 3  losartan (COZAAR) 50 MG tablet, Take 1 tablet (50 mg total) by mouth once daily., Disp: 90 tablet, Rfl: 3  metoprolol tartrate (LOPRESSOR) 25 MG tablet, Take 2 tablets (50 mg total) by mouth 2 (two) times a day. For blood pressure and heart rate control, Disp: 120 tablet, Rfl: 11  multivitamin with minerals tablet, Take 1 tablet by mouth once daily., Disp: , Rfl:   oxyCODONE-acetaminophen (PERCOCET) 5-325 mg per tablet, Take 1 tablet by mouth every 4 (four) hours as needed., Disp: , Rfl:   pantoprazole (PROTONIX) 40 MG tablet, Take 1 tablet by mouth once daily., Disp: , Rfl:   potassium chloride SA (K-DUR,KLOR-CON) 20 MEQ tablet, Take 1 tablet (20 mEq total) by mouth once daily. To help offset potassium loss associated w/ furosemide use, Disp: 30 tablet, Rfl: 0  sodium chloride (SALINE NASAL) 0.65 % nasal spray, 1 spray by Nasal route as needed for Congestion. To help reduce need for Afrin, Disp: 60 mL, Rfl: 0    No current facility-administered medications for this visit.            Review of  Systems   Constitutional: Positive for malaise/fatigue.   HENT: Negative.     Eyes: Negative.    Cardiovascular:  Negative for chest pain, dyspnea on exertion, leg swelling, near-syncope, palpitations and syncope.   Respiratory: Negative.  Negative for shortness of breath.    Endocrine: Negative.    Hematologic/Lymphatic: Negative.    Skin: Negative.    Musculoskeletal: Negative.    Gastrointestinal: Negative.    Genitourinary: Negative.    Neurological: Negative.  Negative for dizziness and light-headedness.   Psychiatric/Behavioral: Negative.     Allergic/Immunologic: Negative.           Objective     Physical Exam  Vitals reviewed.   Constitutional:       General: He is not in acute distress.     Appearance: He is well-developed.   HENT:      Head: Normocephalic and atraumatic.   Eyes:      Pupils: Pupils are equal, round, and reactive to light.   Neck:      Thyroid: No thyromegaly.      Vascular: No JVD.   Cardiovascular:      Rate and Rhythm: Regular rhythm. Tachycardia present.      Chest Wall: PMI is not displaced.      Heart sounds: Normal heart sounds, S1 normal and S2 normal. No murmur heard.     No friction rub. No gallop.   Pulmonary:      Effort: Pulmonary effort is normal. No respiratory distress.      Breath sounds: Normal breath sounds. No wheezing or rales.   Abdominal:      General: Bowel sounds are normal. There is no distension.      Palpations: Abdomen is soft.      Tenderness: There is no abdominal tenderness. There is no guarding or rebound.   Musculoskeletal:         General: No tenderness. Normal range of motion.      Cervical back: Normal range of motion.   Skin:     General: Skin is warm and dry.      Findings: No erythema or rash.   Neurological:      Mental Status: He is alert and oriented to person, place, and time.      Cranial Nerves: No cranial nerve deficit.   Psychiatric:         Behavior: Behavior normal.         Thought Content: Thought content normal.         Judgment: Judgment  normal.            Assessment and Plan     1. Paroxysmal atrial fibrillation with RVR    2. Typical atrial flutter        Plan:  74 yoM here for AF/AFL management. He has symptomatic AF and AFL that are not responsive to medical therapy and cardioversion. I recommended PVI/CTI ablations. I had extensive discussion with patient regarding risks and benefits of PVI/WACA, and the patient would like to proceed. Risks of procedure include (but are not limited to) bleeding, stroke, perforation requiring emergency draining or surgery, AV block, death, AV fistula, AE fistula, PV stenosis.    Last anti-coagulation dose night prior to procedure.  Discontinue antiarrhytmic drugs 4 days prior to the procedure.     RF PVI/CTI  Anesthesia  MARILYNN prior, cancel if NSR  MARCIA    Will start flecainide and reattempt MARILYNN/CV in 2 weeks

## 2024-11-12 NOTE — Clinical Note
See note for PVI.CTI case  I started flecainide today. Can we get him set up for MARILYNN/CV after Thanksgiving? thanks

## 2024-11-13 ENCOUNTER — LAB VISIT (OUTPATIENT)
Dept: LAB | Facility: HOSPITAL | Age: 74
End: 2024-11-13
Payer: MEDICARE

## 2024-11-13 ENCOUNTER — OFFICE VISIT (OUTPATIENT)
Dept: CARDIOLOGY | Facility: CLINIC | Age: 74
End: 2024-11-13
Payer: MEDICARE

## 2024-11-13 VITALS
SYSTOLIC BLOOD PRESSURE: 118 MMHG | WEIGHT: 193.69 LBS | HEIGHT: 72 IN | BODY MASS INDEX: 26.24 KG/M2 | DIASTOLIC BLOOD PRESSURE: 76 MMHG | HEART RATE: 116 BPM | OXYGEN SATURATION: 96 %

## 2024-11-13 DIAGNOSIS — I48.0 PAROXYSMAL ATRIAL FIBRILLATION WITH RVR: ICD-10-CM

## 2024-11-13 DIAGNOSIS — D53.9 MACROCYTIC ANEMIA: ICD-10-CM

## 2024-11-13 DIAGNOSIS — I50.30 HEART FAILURE WITH PRESERVED EJECTION FRACTION, UNSPECIFIED HF CHRONICITY: Primary | ICD-10-CM

## 2024-11-13 DIAGNOSIS — R06.02 SOB (SHORTNESS OF BREATH): ICD-10-CM

## 2024-11-13 DIAGNOSIS — F17.210 TOBACCO DEPENDENCE DUE TO CIGARETTES: ICD-10-CM

## 2024-11-13 DIAGNOSIS — N18.32 STAGE 3B CHRONIC KIDNEY DISEASE: ICD-10-CM

## 2024-11-13 DIAGNOSIS — I10 ESSENTIAL HYPERTENSION: ICD-10-CM

## 2024-11-13 DIAGNOSIS — R79.89 CREATININE ELEVATION: ICD-10-CM

## 2024-11-13 LAB
ALBUMIN SERPL BCP-MCNC: 3.9 G/DL (ref 3.5–5.2)
ALP SERPL-CCNC: 75 U/L (ref 40–150)
ALT SERPL W/O P-5'-P-CCNC: 18 U/L (ref 10–44)
ANION GAP SERPL CALC-SCNC: 10 MMOL/L (ref 8–16)
AST SERPL-CCNC: 18 U/L (ref 10–40)
BASOPHILS # BLD AUTO: 0.1 K/UL (ref 0–0.2)
BASOPHILS NFR BLD: 0.9 % (ref 0–1.9)
BILIRUB SERPL-MCNC: 0.4 MG/DL (ref 0.1–1)
BNP SERPL-MCNC: 759 PG/ML (ref 0–99)
BUN SERPL-MCNC: 51 MG/DL (ref 8–23)
CALCIUM SERPL-MCNC: 8.6 MG/DL (ref 8.7–10.5)
CHLORIDE SERPL-SCNC: 104 MMOL/L (ref 95–110)
CO2 SERPL-SCNC: 21 MMOL/L (ref 23–29)
CREAT SERPL-MCNC: 2.3 MG/DL (ref 0.5–1.4)
DIFFERENTIAL METHOD BLD: ABNORMAL
EOSINOPHIL # BLD AUTO: 0.4 K/UL (ref 0–0.5)
EOSINOPHIL NFR BLD: 3.4 % (ref 0–8)
ERYTHROCYTE [DISTWIDTH] IN BLOOD BY AUTOMATED COUNT: 13.5 % (ref 11.5–14.5)
EST. GFR  (NO RACE VARIABLE): 29 ML/MIN/1.73 M^2
GLUCOSE SERPL-MCNC: 157 MG/DL (ref 70–110)
HCT VFR BLD AUTO: 38.2 % (ref 40–54)
HGB BLD-MCNC: 13.2 G/DL (ref 14–18)
IMM GRANULOCYTES # BLD AUTO: 0.05 K/UL (ref 0–0.04)
IMM GRANULOCYTES NFR BLD AUTO: 0.5 % (ref 0–0.5)
LYMPHOCYTES # BLD AUTO: 1.9 K/UL (ref 1–4.8)
LYMPHOCYTES NFR BLD: 17.8 % (ref 18–48)
MAGNESIUM SERPL-MCNC: 2.2 MG/DL (ref 1.6–2.6)
MCH RBC QN AUTO: 35.3 PG (ref 27–31)
MCHC RBC AUTO-ENTMCNC: 34.6 G/DL (ref 32–36)
MCV RBC AUTO: 102 FL (ref 82–98)
MONOCYTES # BLD AUTO: 0.8 K/UL (ref 0.3–1)
MONOCYTES NFR BLD: 7.5 % (ref 4–15)
NEUTROPHILS # BLD AUTO: 7.4 K/UL (ref 1.8–7.7)
NEUTROPHILS NFR BLD: 69.9 % (ref 38–73)
NRBC BLD-RTO: 0 /100 WBC
PHOSPHATE SERPL-MCNC: 4.7 MG/DL (ref 2.7–4.5)
PLATELET # BLD AUTO: 206 K/UL (ref 150–450)
PMV BLD AUTO: 10 FL (ref 9.2–12.9)
POTASSIUM SERPL-SCNC: 3.8 MMOL/L (ref 3.5–5.1)
PROT SERPL-MCNC: 6.8 G/DL (ref 6–8.4)
RBC # BLD AUTO: 3.74 M/UL (ref 4.6–6.2)
SODIUM SERPL-SCNC: 135 MMOL/L (ref 136–145)
WBC # BLD AUTO: 10.59 K/UL (ref 3.9–12.7)

## 2024-11-13 PROCEDURE — 99999 PR PBB SHADOW E&M-EST. PATIENT-LVL V: CPT | Mod: PBBFAC,,,

## 2024-11-13 PROCEDURE — 1160F RVW MEDS BY RX/DR IN RCRD: CPT | Mod: CPTII,S$GLB,,

## 2024-11-13 PROCEDURE — 3288F FALL RISK ASSESSMENT DOCD: CPT | Mod: CPTII,S$GLB,,

## 2024-11-13 PROCEDURE — 3074F SYST BP LT 130 MM HG: CPT | Mod: CPTII,S$GLB,,

## 2024-11-13 PROCEDURE — 1159F MED LIST DOCD IN RCRD: CPT | Mod: CPTII,S$GLB,,

## 2024-11-13 PROCEDURE — 3008F BODY MASS INDEX DOCD: CPT | Mod: CPTII,S$GLB,,

## 2024-11-13 PROCEDURE — 84100 ASSAY OF PHOSPHORUS: CPT

## 2024-11-13 PROCEDURE — 80053 COMPREHEN METABOLIC PANEL: CPT

## 2024-11-13 PROCEDURE — 4010F ACE/ARB THERAPY RXD/TAKEN: CPT | Mod: CPTII,S$GLB,,

## 2024-11-13 PROCEDURE — 99214 OFFICE O/P EST MOD 30 MIN: CPT | Mod: S$GLB,,,

## 2024-11-13 PROCEDURE — 83880 ASSAY OF NATRIURETIC PEPTIDE: CPT

## 2024-11-13 PROCEDURE — 85025 COMPLETE CBC W/AUTO DIFF WBC: CPT

## 2024-11-13 PROCEDURE — 1126F AMNT PAIN NOTED NONE PRSNT: CPT | Mod: CPTII,S$GLB,,

## 2024-11-13 PROCEDURE — 1101F PT FALLS ASSESS-DOCD LE1/YR: CPT | Mod: CPTII,S$GLB,,

## 2024-11-13 PROCEDURE — 1111F DSCHRG MED/CURRENT MED MERGE: CPT | Mod: CPTII,S$GLB,,

## 2024-11-13 PROCEDURE — 83735 ASSAY OF MAGNESIUM: CPT

## 2024-11-13 PROCEDURE — 36415 COLL VENOUS BLD VENIPUNCTURE: CPT

## 2024-11-13 PROCEDURE — 3078F DIAST BP <80 MM HG: CPT | Mod: CPTII,S$GLB,,

## 2024-11-13 RX ORDER — FUROSEMIDE 40 MG/1
40 TABLET ORAL 2 TIMES DAILY
Qty: 60 TABLET | Refills: 11 | Status: SHIPPED | OUTPATIENT
Start: 2024-11-13 | End: 2025-11-13

## 2024-11-13 NOTE — PATIENT INSTRUCTIONS
Activity and Diet restrictions:   Recommend 2-3 gram sodium restriction and 1500cc- 2000cc fluid restriction.  Call clinic for increased shortness of breath, or increased swelling.  Weigh yourself every day and call the office if your weight increases by more than 3 lbs in 1 day or 5 lbs in 3 days.     Return to clinic for labs and follow up appointment in two weeks.      Reduce lasix to 40mg BID with as needed additional 40mg in afternoon as needed for weight gain, swelling, or shortness of breath

## 2024-11-15 ENCOUNTER — PATIENT MESSAGE (OUTPATIENT)
Dept: ELECTROPHYSIOLOGY | Facility: CLINIC | Age: 74
End: 2024-11-15
Payer: MEDICARE

## 2024-11-15 DIAGNOSIS — I48.0 PAROXYSMAL ATRIAL FIBRILLATION WITH RVR: Primary | ICD-10-CM

## 2024-11-15 DIAGNOSIS — Z79.01 CHRONIC ANTICOAGULATION: ICD-10-CM

## 2024-11-15 DIAGNOSIS — I48.3 TYPICAL ATRIAL FLUTTER: ICD-10-CM

## 2024-11-15 DIAGNOSIS — Z01.818 PREOP TESTING: ICD-10-CM

## 2024-11-18 ENCOUNTER — TELEPHONE (OUTPATIENT)
Dept: ELECTROPHYSIOLOGY | Facility: CLINIC | Age: 74
End: 2024-11-18
Payer: MEDICARE

## 2024-11-19 DIAGNOSIS — I48.3 TYPICAL ATRIAL FLUTTER: ICD-10-CM

## 2024-11-19 DIAGNOSIS — I48.0 PAROXYSMAL ATRIAL FIBRILLATION WITH RVR: Primary | ICD-10-CM

## 2024-11-20 ENCOUNTER — DOCUMENTATION ONLY (OUTPATIENT)
Facility: CLINIC | Age: 74
End: 2024-11-20
Payer: MEDICARE

## 2024-11-20 ENCOUNTER — OFFICE VISIT (OUTPATIENT)
Dept: CARDIOLOGY | Facility: CLINIC | Age: 74
End: 2024-11-20
Payer: MEDICARE

## 2024-11-20 VITALS
BODY MASS INDEX: 25.95 KG/M2 | OXYGEN SATURATION: 96 % | WEIGHT: 191.56 LBS | SYSTOLIC BLOOD PRESSURE: 120 MMHG | RESPIRATION RATE: 18 BRPM | HEIGHT: 72 IN | DIASTOLIC BLOOD PRESSURE: 78 MMHG | HEART RATE: 97 BPM

## 2024-11-20 DIAGNOSIS — I10 ESSENTIAL HYPERTENSION: ICD-10-CM

## 2024-11-20 DIAGNOSIS — I50.32 CHRONIC HEART FAILURE WITH PRESERVED EJECTION FRACTION: ICD-10-CM

## 2024-11-20 DIAGNOSIS — N17.9 AKI (ACUTE KIDNEY INJURY): ICD-10-CM

## 2024-11-20 DIAGNOSIS — Z79.01 CHRONIC ANTICOAGULATION: ICD-10-CM

## 2024-11-20 DIAGNOSIS — I48.0 PAROXYSMAL ATRIAL FIBRILLATION: Primary | ICD-10-CM

## 2024-11-20 DIAGNOSIS — Z51.81 ENCOUNTER FOR MONITORING FLECAINIDE THERAPY: ICD-10-CM

## 2024-11-20 DIAGNOSIS — F17.210 TOBACCO DEPENDENCE DUE TO CIGARETTES: ICD-10-CM

## 2024-11-20 DIAGNOSIS — I48.0 PAROXYSMAL ATRIAL FIBRILLATION WITH RVR: ICD-10-CM

## 2024-11-20 DIAGNOSIS — Z79.899 ENCOUNTER FOR MONITORING FLECAINIDE THERAPY: ICD-10-CM

## 2024-11-20 DIAGNOSIS — I25.10 CORONARY ARTERY CALCIFICATION SEEN ON CT SCAN: ICD-10-CM

## 2024-11-20 DIAGNOSIS — I35.0 NONRHEUMATIC AORTIC VALVE STENOSIS: ICD-10-CM

## 2024-11-20 LAB
OHS QRS DURATION: 106 MS
OHS QTC CALCULATION: 533 MS

## 2024-11-20 PROCEDURE — 3074F SYST BP LT 130 MM HG: CPT | Mod: CPTII,S$GLB,, | Performed by: INTERNAL MEDICINE

## 2024-11-20 PROCEDURE — 4010F ACE/ARB THERAPY RXD/TAKEN: CPT | Mod: CPTII,S$GLB,, | Performed by: INTERNAL MEDICINE

## 2024-11-20 PROCEDURE — 1159F MED LIST DOCD IN RCRD: CPT | Mod: CPTII,S$GLB,, | Performed by: INTERNAL MEDICINE

## 2024-11-20 PROCEDURE — 1111F DSCHRG MED/CURRENT MED MERGE: CPT | Mod: CPTII,S$GLB,, | Performed by: INTERNAL MEDICINE

## 2024-11-20 PROCEDURE — 99999 PR PBB SHADOW E&M-EST. PATIENT-LVL V: CPT | Mod: PBBFAC,,, | Performed by: INTERNAL MEDICINE

## 2024-11-20 PROCEDURE — 3078F DIAST BP <80 MM HG: CPT | Mod: CPTII,S$GLB,, | Performed by: INTERNAL MEDICINE

## 2024-11-20 PROCEDURE — 93000 ELECTROCARDIOGRAM COMPLETE: CPT | Mod: S$GLB,,, | Performed by: INTERNAL MEDICINE

## 2024-11-20 PROCEDURE — 1126F AMNT PAIN NOTED NONE PRSNT: CPT | Mod: CPTII,S$GLB,, | Performed by: INTERNAL MEDICINE

## 2024-11-20 PROCEDURE — 1101F PT FALLS ASSESS-DOCD LE1/YR: CPT | Mod: CPTII,S$GLB,, | Performed by: INTERNAL MEDICINE

## 2024-11-20 PROCEDURE — 3288F FALL RISK ASSESSMENT DOCD: CPT | Mod: CPTII,S$GLB,, | Performed by: INTERNAL MEDICINE

## 2024-11-20 PROCEDURE — 3008F BODY MASS INDEX DOCD: CPT | Mod: CPTII,S$GLB,, | Performed by: INTERNAL MEDICINE

## 2024-11-20 PROCEDURE — 1160F RVW MEDS BY RX/DR IN RCRD: CPT | Mod: CPTII,S$GLB,, | Performed by: INTERNAL MEDICINE

## 2024-11-20 PROCEDURE — 99215 OFFICE O/P EST HI 40 MIN: CPT | Mod: 25,S$GLB,, | Performed by: INTERNAL MEDICINE

## 2024-11-20 RX ORDER — FUROSEMIDE 40 MG/1
40 TABLET ORAL DAILY
Qty: 90 TABLET | Refills: 3 | Status: SHIPPED | OUTPATIENT
Start: 2024-11-20

## 2024-11-20 RX ORDER — METOPROLOL TARTRATE 100 MG/1
100 TABLET ORAL 2 TIMES DAILY
Qty: 180 TABLET | Refills: 3 | Status: SHIPPED | OUTPATIENT
Start: 2024-11-20 | End: 2025-11-20

## 2024-11-20 NOTE — LETTER
November 20, 2024        Hang Membreno MD  [address] [place] [address]  [address]  [place] [address]             [facility]  [address] [facility] [address]  Phone: [phone]   Patient: Nader Clarke   MR Number: [id number]   YOB: 1950   Date of Visit: 11/20/2024       Dear Dr. Membreno:    Thank you for referring Nader Clarke to me for evaluation. Attached you will find relevant portions of my assessment and plan of care.    If you have questions, please do not hesitate to call me. I look forward to following Nader Clarke along with you.    Sincerely,      Teo Agrawal MD            CC  No Recipients    Enclosure

## 2024-11-20 NOTE — PROGRESS NOTES
"HF TCC Provider Note (Follow-up) Consult Note      HPI:  Patient can walk without occasional SOB at long distances   Patient sleeps on 2 number of pillows   Patient wakes up SOB, has to get out of bed, associated cough, sputum and color-denies   Palpitations -  denies   Dizzy, light-headed, pre-syncope or syncope-denies   Since discharge frequency of performing weights, home weight and weight change-states last home weight was 190lbs, not weighing self every day    Other information felt pertinent to HPI: 75 yo w/ aortic stenosis, chronic rhinosinusitis, cigarette smoker, gout, HTN, BPH, chronic HBV infection  He recently lost his wife to leukemia and has been under a lot of stress "but I am doing all right".  Accompanied today by daughter and granddaughter.  He presented to ED after a week of URI/LRI symptoms, started w/ cough/congestion "I felt like I was getting a sinus infection", picked up some OTC "Cough/Severe Cold/Congestion" medication at [facility] and took according to label, felt better but yesterday felt worse and came to ED today for assessment.  Never noted any CP, palpitations, lightheadedness, syncope etc.  Was found in ED to have apparently new onset AF w/ RVR.  No ACS.  Dr Agrawal consulted from ED and brought pt to lab for MARILYNN and successful DCCV.  Pt was initially on diltiazem drip and destined for ICU but after this was deemed appropriate for tele zuleta.  Currently sitting up feeling better.  Started on Eliquis and Lovenox, metoprolol, and likely can go home tomorrow.  He sees a PCP who had wanted him to f/u w/ cards for his AS as he'd fallen out of touch w/ his previous cardiologist, and he says he will start seeing Dr Agrawal for this. Patient being followed in TCC for fluid volume and GDMT management.     PHYSICAL:   Vitals:    11/27/24 1300   BP: 110/74      Vitals:    11/27/24 1323   BP:    Pulse: 91   Resp:             JVD: no  Heart rhythm: irregular  Cardiac murmur: Yes     S3: no  S4: " no  Lungs: clear  Hepatojugular reflux: yes, midneck  Edema: yes trace BLE        MARILYNN 10/24:     Findings/Key Components:   Normal biventricular size/fxn, LVEF 55%.  Normal wall motion.  Mod Ca++ AS, SASHA 1.4cm2 by planimetry.  Mod AI.  MAC with mod MR  Mild TR  Biatrial enlargement  No LA/SURAJ thrombus     Successful DCCV AF->NSR 78 BPM 200J x1.    ASSESSMENT: HFpEF     PLAN:      Patient Instructions:   Instruct the patient to notify this clinic if HH, a physician or an advanced care provider wants to change medication one of their HF medications   Activity and Diet restrictions:   Recommend 2-3 gram sodium restriction and 1500cc- 2000cc fluid restriction.  Encourage physical activity with graded exercise program.  Requested patient to weigh themselves daily, and to notify us if their weight increases by more than 3 lbs in 1 day or 5 lbs in 3 days.    Assigned dry weight on home scale: 190 on home scale. 193 today on clinic scale  Medication changes (include current dose and changed dose): Continue metoprolol 100mg BID and continue losartan 50mg daily, lasix 40mg daily for GDMT. Patient states increasing edema and SOB on 40mg lasix once a day dose. States he had seen another provider end of last week and that provider increased the lasix back to 40mg BID on Friday. Patient states improvement in CHF symptoms since increase in dose. Creatinine decreased from 2.3-2. Consider decreasing lasix to 40mg daily with PRN dose in afternoon once cardioversion is complete.  Upcoming labs and date anticipated: RTC in one week for labs and follow up appointment.  Other diagnostic tests ordered:  Patient cardioversion scheduled for next week. Nephrology referral has been placed for increased creatinine. General cardiology notified of plan of care. Will make additional adjustments pending recommendations. EKG in office shows rate controlled A flutter.

## 2024-11-20 NOTE — PROGRESS NOTES
CARDIOVASCULAR PROGRESS NOTE    REASON FOR CONSULT:   Nader Clarke is a 74 y.o. male who presents for follow up of PAF/FL, AS.    PCP: Haseeb BIGGS: Chan  HISTORY OF PRESENT ILLNESS:   The patient returns for follow up of his recent hospitalization with heart failure and atrial fibrillation.  He was also noted to have moderate aortic stenosis.  He was feeling better, does not describe any short windedness.  He denies any angina, palpitations, or syncope.  He appears to be back in atrial flutter with controlled ventricular response.  He was recently seen by Dr. Chaney and started on flecainide with plans for repeat MARILYNN guided cardioversion in early December as well as pulmonary vein isolation in early 2025.  He continues take his Eliquis without any difficulty.  Note is made of an elevated creatinine from baseline of 1.5 up to 2.3 today.    CARDIOVASCULAR HISTORY:   PAF/FL on apixaban 5mg bid and flecainide s/p MARILYNN/DCCV 10/24/24    AS, mild-mod (echo 10/2024)    PAST MEDICAL HISTORY:     Past Medical History:   Diagnosis Date    Arthritis of big toe     RIGHT FOOT BIG TOE    Chronic hepatitis B     Chronic rhinosinusitis     Gout     Hypertension     Vitamin D deficiency        PAST SURGICAL HISTORY:     Past Surgical History:   Procedure Laterality Date    BALLOON SINUPLASTY OF PARANASAL SINUS      CARDIOVERSION N/A 10/24/2024    Procedure: Cardioversion;  Surgeon: Teo Agrawal MD;  Location: [facility] CATH LAB;  Service: Cardiology;  Laterality: N/A;    CARPUL TUNNEL      RIGHT HAND    NERVE REPAIR      RIGHT ARM    RIGHT KNEE REPLACEMENT 7/21/2015      TRANSESOPHAGEAL ECHOCARDIOGRAM WITH POSSIBLE CARDIOVERSION (MARILYNN W/ POSS CARDIOVERSION) N/A 10/24/2024    Procedure: Transesophageal echo (MARILYNN) intra-procedure log documentation;  Surgeon: Teo Agrawal MD;  Location: [facility] CATH LAB;  Service: Cardiology;  Laterality: N/A;       ALLERGIES AND MEDICATION:     Review of patient's allergies indicates:    Allergen Reactions    Augmentin [amoxicillin-pot clavulanate] Hives and Rash        Medication List            Accurate as of November 20, 2024  9:34 AM. If you have any questions, ask your nurse or doctor.                CHANGE how you take these medications      furosemide 40 MG tablet  Commonly known as: LASIX  Take 1 tablet (40 mg total) by mouth once daily.  What changed: when to take this  Changed by: Teo Agrawal MD     metoprolol tartrate 100 MG tablet  Commonly known as: LOPRESSOR  Take 1 tablet (100 mg total) by mouth 2 (two) times a day. For blood pressure and heart rate control  What changed:   medication strength  how much to take  Changed by: Teo Agrawal MD            CONTINUE taking these medications      allopurinoL 100 MG tablet  Commonly known as: ZYLOPRIM     apixaban 5 mg Tab  Commonly known as: ELIQUIS  Take 1 tablet (5 mg total) by mouth 2 (two) times daily. To prevent blood clots in heart and strokes     ascorbic Acid 500 mg Cpsr  Commonly known as: VITAMIN C     cetirizine 5 MG tablet  Commonly known as: ZYRTEC  Take 1 tablet (5 mg total) by mouth once daily. To help w/ allergy/sinus     doxazosin 8 MG Tab  Commonly known as: CARDURA     entecavir 1 MG Tab  Commonly known as: BARACLUDE     ergocalciferol 50,000 unit Cap  Commonly known as: ERGOCALCIFEROL  Take 1 capsule (50,000 Units total) by mouth every 7 days.     ferrous sulfate 142 mg (45 mg iron) Tbsr  Commonly known as: SLOW RELEASE IRON     flecainide 100 MG Tab  Commonly known as: TAMBOCOR  Take 1 tablet (100 mg total) by mouth 2 (two) times daily.     fluticasone propionate 50 mcg/actuation nasal spray  Commonly known as: FLONASE  2 sprays (100 mcg total) by Each Nostril route every evening. To help w/ allergy/sinus     guaiFENesin 600 mg 12 hr tablet  Commonly known as: MUCINEX  Take 1 tablet (600 mg total) by mouth 2 (two) times daily. To help clear sinus and chest congestion     hydrALAZINE 50 MG tablet  Commonly  known as: APRESOLINE     multivitamin with minerals tablet     oxyCODONE-acetaminophen 5-325 mg per tablet  Commonly known as: PERCOCET     pantoprazole 40 MG tablet  Commonly known as: PROTONIX     potassium chloride SA 20 MEQ tablet  Commonly known as: K-DURKLOR-CON  Take 1 tablet (20 mEq total) by mouth once daily. To help offset potassium loss associated w/ furosemide use     Saline NasaL 0.65 % nasal spray  Generic drug: sodium chloride  1 spray by Nasal route as needed for Congestion. To help reduce need for Afrin     VITAMIN B-12 100 MCG tablet  Generic drug: cyanocobalamin            STOP taking these medications      losartan 50 MG tablet  Commonly known as: COZAAR  Stopped by: Teo Agrawal MD               Where to Get Your Medications        These medications were sent to [id number] - [place] - [address]  [address] [place] [address]      Phone: [phone]   furosemide 40 MG tablet  metoprolol tartrate 100 MG tablet         SOCIAL HISTORY:     Social History     Socioeconomic History    Marital status:    Tobacco Use    Smoking status: Every Day     Current packs/day: 2.00     Average packs/day: 2.0 packs/day for 0.9 years (1.8 ttl pk-yrs)     Types: Cigars, Cigarettes     Start date: 2024     Last attempt to quit: 7/6/2016    Smokeless tobacco: Former   Substance and Sexual Activity    Alcohol use: Yes     Alcohol/week: 3.0 standard drinks of alcohol     Types: 3 Cans of beer per week    Drug use: No    Sexual activity: Yes     Partners: Female     Social Drivers of Health     Financial Resource Strain: Medium Risk (11/11/2024)    Overall Financial Resource Strain (CARDIA)     Difficulty of Paying Living Expenses: Somewhat hard   Food Insecurity: No Food Insecurity (11/11/2024)    Hunger Vital Sign     Worried About Running Out of Food in the Last Year: Never true     Ran Out of Food in the Last Year: Never true    Transportation Needs: No Transportation Needs (10/26/2024)    TRANSPORTATION NEEDS     Transportation : No   Physical Activity: Sufficiently Active (11/11/2024)    Exercise Vital Sign     Days of Exercise per Week: 7 days     Minutes of Exercise per Session: 30 min   Stress: No Stress Concern Present (11/11/2024)    [employer] - Occupational Stress Questionnaire     Feeling of Stress : Only a little   Housing Stability: Low Risk  (11/11/2024)    Housing Stability Vital Sign     Unable to Pay for Housing in the Last Year: No     Homeless in the Last Year: No       FAMILY HISTORY:   No family history on file.    REVIEW OF SYSTEMS:   Review of Systems   Constitutional:  Negative for chills, diaphoresis and fever.   HENT:  Negative for nosebleeds.    Eyes:  Negative for blurred vision, double vision and photophobia.   Respiratory:  Negative for hemoptysis, shortness of breath and wheezing.    Cardiovascular:  Negative for chest pain, palpitations, orthopnea, claudication, leg swelling and PND.   Gastrointestinal:  Negative for abdominal pain, blood in stool, heartburn, melena, nausea and vomiting.   Genitourinary:  Negative for flank pain and hematuria.   Musculoskeletal:  Negative for falls, myalgias and neck pain.   Skin:  Negative for rash.   Neurological:  Negative for dizziness, seizures, loss of consciousness, weakness and headaches.   Endo/Heme/Allergies:  Negative for polydipsia. Does not bruise/bleed easily.   Psychiatric/Behavioral:  Negative for depression and memory loss. The patient is not nervous/anxious.        PHYSICAL EXAM:     Vitals:    11/20/24 0915   BP: 120/78   Pulse: 97   Resp: 18    Body mass index is 25.98 kg/m².  Weight: 86.9 kg (191 lb 9.3 oz)   Height: 6' (182.9 cm)     Physical Exam  Vitals reviewed.   Constitutional:       General: He is not in acute distress.     Appearance: Normal appearance. He is well-developed. He is not ill-appearing, toxic-appearing  or diaphoretic.   HENT:      Head: Normocephalic and atraumatic.   Eyes:      General: No scleral icterus.     Extraocular Movements: Extraocular movements intact.      Conjunctiva/sclera: Conjunctivae normal.      Pupils: Pupils are equal, round, and reactive to light.   Neck:      Thyroid: No thyromegaly.      Vascular: Normal carotid pulses. No JVD.      Trachea: Trachea normal.   Cardiovascular:      Rate and Rhythm: Normal rate and regular rhythm.      Heart sounds: S1 normal and S2 normal. Heart sounds are distant. No murmur heard.     No friction rub. No gallop.   Pulmonary:      Effort: Pulmonary effort is normal. No respiratory distress.      Breath sounds: Normal breath sounds. No stridor. No wheezing, rhonchi or rales.   Chest:      Chest wall: No tenderness.   Abdominal:      General: There is no distension.      Palpations: Abdomen is soft.   Musculoskeletal:         General: No swelling or tenderness. Normal range of motion.      Cervical back: Normal range of motion and neck supple. No edema or rigidity.      Right lower leg: No edema.      Left lower leg: No edema.   Feet:      Right foot:      Skin integrity: No ulcer.      Left foot:      Skin integrity: No ulcer.   Skin:     General: Skin is warm and dry.      Coloration: Skin is not jaundiced.   Neurological:      General: No focal deficit present.      Mental Status: He is alert and oriented to person, place, and time.      Cranial Nerves: No cranial nerve deficit.   Psychiatric:         Mood and Affect: Mood normal.         Speech: Speech normal.         Behavior: Behavior normal. Behavior is cooperative.         DATA:   EKG: (personally reviewed tracing(s))  11/20/24 AFL 2:1 99, ASMI-age indet, LAD,  (was 88 on tracing 11/12/24)    Laboratory:  CBC:  Recent Labs   Lab 10/26/24  0336 10/31/24  1315 11/13/24  1326   WBC 10.86 11.19 10.59   Hemoglobin 12.3 L 12.9 L 13.2 L   Hematocrit 37.0 L 37.4 L 38.2 L   Platelets 236 272 206        CHEMISTRIES:  Recent Labs   Lab 10/25/24  0452 10/26/24  0336 10/31/24  1315 11/06/24  1213 11/13/24  1326   Glucose 110 117 H 132 H 96 157 H   Sodium 139 143 140 140 135 L   Potassium 3.6 3.5 4.4 4.1 3.8   BUN 22 28 H 32 H 44 H 51 H   Creatinine 1.5 H 1.5 H 2.2 H 2.3 H 2.3 H   eGFR 49 A 49 A 31 A 29 A 29 A   Calcium 9.2 8.8 8.6 L 8.8 8.6 L   Magnesium 2.2 2.0 2.3 2.7 H 2.2       CARDIAC BIOMARKERS:  Recent Labs   Lab 10/24/24  0929 10/25/24  0537   Troponin I 0.027 H 0.036 H       COAGS:        LIPIDS/LFTS:  Recent Labs   Lab 10/25/24  0452 10/31/24  1315 11/06/24  1213 11/13/24  1326   Cholesterol 127  --   --   --    Triglycerides 98  --   --   --    HDL 41  --   --   --    LDL Cholesterol 66.4  --   --   --    Non-HDL Cholesterol 86  --   --   --    AST  --  18 17 18   ALT  --  20 14 18     Lab Results   Component Value Date    TSH 1.035 10/24/2024         Cardiovascular Testing:  MARILYNN/DCCV 10/24/24  Normal biventricular size/fxn, LVEF 55%.  Normal wall motion.  Mod Ca++ AS, SASHA 1.4cm2 by planimetry.  Mod AI.  MAC with mod MR  Mild TR  Biatrial enlargement  No LA/SURAJ thrombus  Successful DCCV AF->NSR 78 BPM 200J x1.  Plan:  Stop dilt gtt  Start eliquis 5mg bid  Cont BBL, titrate as HR/BP allows  Home in am  Follow up with Dr. Agrawal     Echo: 6/5/24    Mild left ventricular hypertrophy.     Normal left ventricular systolic function.     Left ventricular ejection fraction is estimated at  60-65%.     Severely increased left atrial size.     Severe mitral annular calcification.     Mild mitral valve regurgitation.     Moderate aortic valve calcification.     Velocities compatible with severe aortic stenosis.     Aortic valve mean gradient is 29.7 mmHg.     SASHA by planimetry=1.69cm2.     Mild tricuspid valve regurgitation.      Carotid US 6/5/24  1.   There is mild plaque on the right as detailed above. There is no sonographic evidence of a hemodynamically significant stenosis (less than 50%).   2.   There is  antegrade flow in both vertebral arteries.     ASSESSMENT:   # PAF/FL, HR controlled, on apixaban 5mg bid and now on flecainide (managed by Dr. Giles), repeat MARILYNN/DCCV planned 12/2/24 with eventual PVI per Dr. Giles note 11/11/24.  QRS widening noted.  Message sent to Dr. Giles to further assess for decrease flecainide dose.   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  Consider addition of statin +/- neph referral.  Surveillance echo 6 months (May 2025)    Complex OV.    The above documents medical care services that are part of ongoing care related to this patient's serious/complex condition (Code ). (?CAD/HTN/AS/PAF)        Teo Agrawal MD, [facility]    Addendum 1030am  Teo Giles MD Castine, Michael R., MD  Caller: Unspecified (Today,  9:33 AM)  QRS has fluctuated between 86 and 108. In sinus rhythm in October, it was 108. In general I do not alter flecainide unless the QRS gets over 150 ms     ----- Message -----  From: Teo Agrawal MD  Sent: 11/20/2024   9:33 AM CST  To: Teo Giles MD    Take a quick peek at this patient's EKG dated 11/20/2024.  QRS duration now 106, previously 86.  Do you want to decrease flecainide to 50 mg b.i.d.?

## 2024-11-20 NOTE — PROGRESS NOTES
"[facility]) weekly phone follow up / triage call completed.     TCC RN Navigator spoke with Mr. Doe    Current Patient reported weight: 185 lbs   Patient Goal Weight: unknown  Recent Patient reported blood pressure and heart rate: 120/70    Pt reports the following:  []  Shortness of Breath with Activity  []  Shortness of Breath at rest   []  Fatigue  []  Edema   [] Chest pain or tightness  [] Weight Increase since discharge  [x] None of the above    Pt reports using "Daily weight and symptom tracker".    Pt reports being in the green Zone. If in yellow/red, reminded that they should be calling [facility] today or now.     Medications:   Medication compliance reviewed with pt.  Pt reports having medication list available and has all medications at home for use per list.     Education:   Confirmed pt still has "[facility] Home Care Guide"  .     Reminded of key points as listed below.     Recommend 2 -3 gram sodium restriction and 1500 cc-2000 cc fluid restriction.  Encourage physical activity with graded exercise program.  Requested patient to weigh themselves daily, and to notify us if their weight increases by more than 3 lbs in 1 day or 5 lbs in 3 days.   Reminded to use "Daily weight and symptom tracker".  Even if pt does not have a scale, to use symptom tracker.       Watch for these Signs and Symptoms: If any of these occur, contact [facility] immediately:   Increase in shortness of breath with movement   Increase in swelling in your legs and ankles   Weight gain of more than 3 pounds in a day or 5 pounds in 3 days.   Difficulty breathing when you are lying down   Worsening fatigue or tiredness   Stomach bloating, a full feeling or a loss of appetite   Increased coughing--especially when you are lying down      Pt was able to verbalize back to RN in their own words correct diet/fluid restrictions, necessity for exercise, warning signs and symptoms, when " and how to contact their [facility] team.      Pt reminded of upcoming appointment. 11/27/2024 PT reports they will attend.       Pt reminded of how and when to contact [facility]:  [phone](Mon-Fri, 8a-5p) & for urgent issues on the weekend to page the Heart Transplant MD on call.  Pt also encouraged utilize [facility] messaging as well.      Pt  verbalized understanding and in agreement of plan.       Will follow up with pt at next clinic visit and RN navigator available for pt questions, issues or concerns.

## 2024-11-26 ENCOUNTER — TELEPHONE (OUTPATIENT)
Facility: CLINIC | Age: 74
End: 2024-11-26
Payer: MEDICARE

## 2024-11-27 ENCOUNTER — LAB VISIT (OUTPATIENT)
Dept: LAB | Facility: HOSPITAL | Age: 74
End: 2024-11-27
Payer: MEDICARE

## 2024-11-27 ENCOUNTER — OFFICE VISIT (OUTPATIENT)
Facility: CLINIC | Age: 74
End: 2024-11-27
Payer: MEDICARE

## 2024-11-27 VITALS
OXYGEN SATURATION: 98 % | BODY MASS INDEX: 27.44 KG/M2 | RESPIRATION RATE: 22 BRPM | SYSTOLIC BLOOD PRESSURE: 110 MMHG | DIASTOLIC BLOOD PRESSURE: 74 MMHG | HEIGHT: 72 IN | WEIGHT: 202.63 LBS | HEART RATE: 91 BPM

## 2024-11-27 DIAGNOSIS — I48.3 TYPICAL ATRIAL FLUTTER: ICD-10-CM

## 2024-11-27 DIAGNOSIS — I10 ESSENTIAL HYPERTENSION: ICD-10-CM

## 2024-11-27 DIAGNOSIS — I48.0 PAROXYSMAL ATRIAL FIBRILLATION WITH RVR: ICD-10-CM

## 2024-11-27 DIAGNOSIS — Z79.01 CHRONIC ANTICOAGULATION: ICD-10-CM

## 2024-11-27 DIAGNOSIS — R06.02 SOB (SHORTNESS OF BREATH): ICD-10-CM

## 2024-11-27 DIAGNOSIS — Z01.818 PREOP TESTING: ICD-10-CM

## 2024-11-27 DIAGNOSIS — N18.32 STAGE 3B CHRONIC KIDNEY DISEASE: ICD-10-CM

## 2024-11-27 DIAGNOSIS — I50.9 ACUTE CONGESTIVE HEART FAILURE, UNSPECIFIED HEART FAILURE TYPE: Primary | ICD-10-CM

## 2024-11-27 DIAGNOSIS — I48.92 ATRIAL FLUTTER, UNSPECIFIED TYPE: ICD-10-CM

## 2024-11-27 LAB
ALBUMIN SERPL BCP-MCNC: 3.5 G/DL (ref 3.5–5.2)
ALP SERPL-CCNC: 80 U/L (ref 40–150)
ALT SERPL W/O P-5'-P-CCNC: 17 U/L (ref 10–44)
ANION GAP SERPL CALC-SCNC: 10 MMOL/L (ref 8–16)
APTT PPP: 39.1 SEC (ref 21–32)
AST SERPL-CCNC: 21 U/L (ref 10–40)
BILIRUB SERPL-MCNC: 0.4 MG/DL (ref 0.1–1)
BNP SERPL-MCNC: 1119 PG/ML (ref 0–99)
BUN SERPL-MCNC: 39 MG/DL (ref 8–23)
CALCIUM SERPL-MCNC: 8.7 MG/DL (ref 8.7–10.5)
CHLORIDE SERPL-SCNC: 106 MMOL/L (ref 95–110)
CO2 SERPL-SCNC: 21 MMOL/L (ref 23–29)
CREAT SERPL-MCNC: 2 MG/DL (ref 0.5–1.4)
ERYTHROCYTE [DISTWIDTH] IN BLOOD BY AUTOMATED COUNT: 13.6 % (ref 11.5–14.5)
EST. GFR  (NO RACE VARIABLE): 35 ML/MIN/1.73 M^2
GLUCOSE SERPL-MCNC: 131 MG/DL (ref 70–110)
HCT VFR BLD AUTO: 35.4 % (ref 40–54)
HGB BLD-MCNC: 12 G/DL (ref 14–18)
INR PPP: 1.1 (ref 0.8–1.2)
MAGNESIUM SERPL-MCNC: 2 MG/DL (ref 1.6–2.6)
MCH RBC QN AUTO: 34.8 PG (ref 27–31)
MCHC RBC AUTO-ENTMCNC: 33.9 G/DL (ref 32–36)
MCV RBC AUTO: 103 FL (ref 82–98)
PLATELET # BLD AUTO: 220 K/UL (ref 150–450)
PMV BLD AUTO: 10 FL (ref 9.2–12.9)
POTASSIUM SERPL-SCNC: 3.9 MMOL/L (ref 3.5–5.1)
PROT SERPL-MCNC: 6.8 G/DL (ref 6–8.4)
PROTHROMBIN TIME: 11.5 SEC (ref 9–12.5)
RBC # BLD AUTO: 3.45 M/UL (ref 4.6–6.2)
SODIUM SERPL-SCNC: 137 MMOL/L (ref 136–145)
WBC # BLD AUTO: 11.51 K/UL (ref 3.9–12.7)

## 2024-11-27 PROCEDURE — 85610 PROTHROMBIN TIME: CPT | Performed by: INTERNAL MEDICINE

## 2024-11-27 PROCEDURE — 1160F RVW MEDS BY RX/DR IN RCRD: CPT | Mod: CPTII,S$GLB,,

## 2024-11-27 PROCEDURE — 99999 PR PBB SHADOW E&M-EST. PATIENT-LVL V: CPT | Mod: PBBFAC,,,

## 2024-11-27 PROCEDURE — 83880 ASSAY OF NATRIURETIC PEPTIDE: CPT

## 2024-11-27 PROCEDURE — 83735 ASSAY OF MAGNESIUM: CPT

## 2024-11-27 PROCEDURE — 85027 COMPLETE CBC AUTOMATED: CPT | Performed by: INTERNAL MEDICINE

## 2024-11-27 PROCEDURE — 1159F MED LIST DOCD IN RCRD: CPT | Mod: CPTII,S$GLB,,

## 2024-11-27 PROCEDURE — 4010F ACE/ARB THERAPY RXD/TAKEN: CPT | Mod: CPTII,S$GLB,,

## 2024-11-27 PROCEDURE — 3008F BODY MASS INDEX DOCD: CPT | Mod: CPTII,S$GLB,,

## 2024-11-27 PROCEDURE — 80053 COMPREHEN METABOLIC PANEL: CPT

## 2024-11-27 PROCEDURE — 93010 ELECTROCARDIOGRAM REPORT: CPT | Mod: S$GLB,,, | Performed by: INTERNAL MEDICINE

## 2024-11-27 PROCEDURE — 1126F AMNT PAIN NOTED NONE PRSNT: CPT | Mod: CPTII,S$GLB,,

## 2024-11-27 PROCEDURE — 3074F SYST BP LT 130 MM HG: CPT | Mod: CPTII,S$GLB,,

## 2024-11-27 PROCEDURE — 3078F DIAST BP <80 MM HG: CPT | Mod: CPTII,S$GLB,,

## 2024-11-27 PROCEDURE — 99214 OFFICE O/P EST MOD 30 MIN: CPT | Mod: S$GLB,,,

## 2024-11-27 PROCEDURE — 93005 ELECTROCARDIOGRAM TRACING: CPT | Mod: S$GLB,,,

## 2024-11-27 PROCEDURE — 85730 THROMBOPLASTIN TIME PARTIAL: CPT | Performed by: INTERNAL MEDICINE

## 2024-11-27 PROCEDURE — 36415 COLL VENOUS BLD VENIPUNCTURE: CPT

## 2024-11-27 NOTE — PROGRESS NOTES
"HF TCC Provider Note (Follow-up) Consult Note      HPI:  Patient can walk with occasional SOB at long distances   Patient sleeps on 2 number of pillows   Patient wakes up SOB, has to get out of bed, associated cough, sputum and color-denies   Palpitations -  denies   Dizzy, light-headed, pre-syncope or syncope-denies   Since discharge frequency of performing weights, home weight and weight change-states last home weight was 186lbs, not weighing self every day    Other information felt pertinent to HPI: 73 yo w/ aortic stenosis, chronic rhinosinusitis, cigarette smoker, gout, HTN, BPH, chronic HBV infection  He recently lost his wife to leukemia and has been under a lot of stress "but I am doing all right".  Accompanied today by daughter and granddaughter.  He presented to ED after a week of URI/LRI symptoms, started w/ cough/congestion "I felt like I was getting a sinus infection", picked up some OTC "Cough/Severe Cold/Congestion" medication at [facility] and took according to label, felt better but yesterday felt worse and came to ED today for assessment.  Never noted any CP, palpitations, lightheadedness, syncope etc.  Was found in ED to have apparently new onset AF w/ RVR.  No ACS.  Dr Agrawal consulted from ED and brought pt to lab for MARILYNN and successful DCCV.  Pt was initially on diltiazem drip and destined for ICU but after this was deemed appropriate for tele zuleta.  Currently sitting up feeling better.  Started on Eliquis and Lovenox, metoprolol, and likely can go home tomorrow.  He sees a PCP who had wanted him to f/u w/ cards for his AS as he'd fallen out of touch w/ his previous cardiologist, and he says he will start seeing Dr Agrawal for this. Patient being followed in TCC for fluid volume and GDMT management.     PHYSICAL:   Vitals:    12/04/24 1405   BP: 110/60   Pulse: 64    JVD: no  Heart rhythm: regular  Cardiac murmur: Yes     S3: no  S4: no  Lungs: clear  Hepatojugular reflux: yes  Edema: no     "   MARILYNN 10/24:     Findings/Key Components:   Normal biventricular size/fxn, LVEF 55%.  Normal wall motion.  Mod Ca++ AS, SASHA 1.4cm2 by planimetry.  Mod AI.  MAC with mod MR  Mild TR  Biatrial enlargement  No LA/SURAJ thrombus     Successful DCCV AF->NSR 78 BPM 200J x1.    ASSESSMENT: HFpEF     PLAN:      Patient Instructions:   Instruct the patient to notify this clinic if HH, a physician or an advanced care provider wants to change medication one of their HF medications   Activity and Diet restrictions:   Recommend 2-3 gram sodium restriction and 1500cc- 2000cc fluid restriction.  Encourage physical activity with graded exercise program.  Requested patient to weigh themselves daily, and to notify us if their weight increases by more than 3 lbs in 1 day or 5 lbs in 3 days.    Assigned dry weight on home scale: 186 on home scale. Previously 190 lbs  Medication changes (include current dose and changed dose): Continue metoprolol 100mg BID and continue losartan 50mg daily. Increasing creatinine from 1.5 to 3 over the course of the last month. Consulted with nephrology. Nephrologist recommends holding lasix this evening, tomorrow and repeating labs Friday to reassess kidney function. Patient reporting increased tiredness, and still has intermittent SOB. Recent medication added was flecainide. Patient instructed to reach out to EP office regarding antiarrythmic.  Upcoming labs and date anticipated: RTC Friday for labs.  Other diagnostic tests ordered:  Patient cardioversion complete. Nephrology has follow up appointment scheduled for February. Reached out to clinic staff to request sooner appointment.

## 2024-11-27 NOTE — PATIENT INSTRUCTIONS
Activity and Diet restrictions:   Recommend 2-3 gram sodium restriction and 1500cc- 2000cc fluid restriction.  Call clinic for increased shortness of breath, or increased swelling.  Weigh yourself every day and call the office if your weight increases by more than 3 lbs in 1 day or 5 lbs in 3 days.     Return to clinic for labs and follow up appointment in one week.      Continue lasix 40mg twice a day at this time pending lab results

## 2024-11-28 LAB
OHS QRS DURATION: 104 MS
OHS QRS DURATION: 106 MS
OHS QTC CALCULATION: 482 MS
OHS QTC CALCULATION: 514 MS

## 2024-11-29 ENCOUNTER — TELEPHONE (OUTPATIENT)
Dept: ELECTROPHYSIOLOGY | Facility: CLINIC | Age: 74
End: 2024-11-29
Payer: MEDICARE

## 2024-11-29 NOTE — TELEPHONE ENCOUNTER
Spoke to patient    CONFIRMED procedure arrival time of 10 am    Reiterated instructions including:    -Directions to check in desk    -NPO after midnight night prior to procedure. Fasting upon arrival to the hospital the day of the procedure. Patient allowed to drink water up until 2 hours prior to arrival due to IV fluid shortage.     -Confirmed compliance of Eliquis; pt states no missed doses    - Confirms no new meds prescribed or med changes since scheduling -     -Pre-procedure LABS Reviewed; BUN 39 on 11/27 and 51 on 11/13; Creatinine 2.0 on 11/27 and 2.3 on 11/13. Seems to be pt's baseline over the last month and pt has been set up to see Nephrology on 2/26/25    -Confirmed absence or presence of implanted device/stimulator n/a    -Confirmed no recent or current fever, cough, or shortness of breath .    -Confirmed no redness, rash, irritation, or yeast infection to skin/ chest area.     Patient verbalized understanding of above, denies any further questions and appreciated the call.

## 2024-12-02 ENCOUNTER — ANESTHESIA EVENT (OUTPATIENT)
Dept: MEDSURG UNIT | Facility: HOSPITAL | Age: 74
End: 2024-12-02
Payer: MEDICARE

## 2024-12-02 ENCOUNTER — HOSPITAL ENCOUNTER (OUTPATIENT)
Facility: HOSPITAL | Age: 74
Discharge: HOME | End: 2024-12-02
Attending: INTERNAL MEDICINE | Admitting: INTERNAL MEDICINE
Payer: MEDICARE

## 2024-12-02 ENCOUNTER — HOSPITAL ENCOUNTER (OUTPATIENT)
Dept: CARDIOLOGY | Facility: HOSPITAL | Age: 74
Discharge: HOME | End: 2024-12-02
Attending: INTERNAL MEDICINE | Admitting: INTERNAL MEDICINE
Payer: MEDICARE

## 2024-12-02 ENCOUNTER — ANESTHESIA (OUTPATIENT)
Dept: MEDSURG UNIT | Facility: HOSPITAL | Age: 74
End: 2024-12-02
Payer: MEDICARE

## 2024-12-02 VITALS
HEART RATE: 63 BPM | OXYGEN SATURATION: 99 % | RESPIRATION RATE: 18 BRPM | BODY MASS INDEX: 25.73 KG/M2 | WEIGHT: 190 LBS | HEIGHT: 72 IN | SYSTOLIC BLOOD PRESSURE: 117 MMHG | TEMPERATURE: 98 F | DIASTOLIC BLOOD PRESSURE: 72 MMHG

## 2024-12-02 VITALS
WEIGHT: 190 LBS | DIASTOLIC BLOOD PRESSURE: 67 MMHG | BODY MASS INDEX: 25.73 KG/M2 | SYSTOLIC BLOOD PRESSURE: 109 MMHG | HEIGHT: 72 IN

## 2024-12-02 DIAGNOSIS — I48.91 ATRIAL FIBRILLATION: ICD-10-CM

## 2024-12-02 DIAGNOSIS — I48.0 PAROXYSMAL ATRIAL FIBRILLATION WITH RVR: ICD-10-CM

## 2024-12-02 DIAGNOSIS — I48.3 TYPICAL ATRIAL FLUTTER: ICD-10-CM

## 2024-12-02 DIAGNOSIS — I48.0 AF (PAROXYSMAL ATRIAL FIBRILLATION): ICD-10-CM

## 2024-12-02 LAB
BSA FOR ECHO PROCEDURE: 2.09 M2
OHS QRS DURATION: 104 MS
OHS QRS DURATION: 106 MS
OHS QTC CALCULATION: 428 MS
OHS QTC CALCULATION: 468 MS
SINUS: 3.2 CM
STJ: 2.8 CM

## 2024-12-02 PROCEDURE — 93312 ECHO TRANSESOPHAGEAL: CPT | Mod: 26,,, | Performed by: INTERNAL MEDICINE

## 2024-12-02 PROCEDURE — 25000003 PHARM REV CODE 250: Performed by: NURSE ANESTHETIST, CERTIFIED REGISTERED

## 2024-12-02 PROCEDURE — 93320 DOPPLER ECHO COMPLETE: CPT | Mod: 26,,, | Performed by: INTERNAL MEDICINE

## 2024-12-02 PROCEDURE — 93325 DOPPLER ECHO COLOR FLOW MAPG: CPT | Mod: 26,,, | Performed by: INTERNAL MEDICINE

## 2024-12-02 PROCEDURE — D9220A PRA ANESTHESIA: Mod: ANES,,, | Performed by: ANESTHESIOLOGY

## 2024-12-02 PROCEDURE — 93005 ELECTROCARDIOGRAM TRACING: CPT

## 2024-12-02 PROCEDURE — 37000008 HC ANESTHESIA 1ST 15 MINUTES: Performed by: INTERNAL MEDICINE

## 2024-12-02 PROCEDURE — 93010 ELECTROCARDIOGRAM REPORT: CPT | Mod: XU,,, | Performed by: INTERNAL MEDICINE

## 2024-12-02 PROCEDURE — 63600175 PHARM REV CODE 636 W HCPCS: Performed by: NURSE ANESTHETIST, CERTIFIED REGISTERED

## 2024-12-02 PROCEDURE — 93010 ELECTROCARDIOGRAM REPORT: CPT | Mod: 59,,, | Performed by: INTERNAL MEDICINE

## 2024-12-02 PROCEDURE — 37000009 HC ANESTHESIA EA ADD 15 MINS: Performed by: INTERNAL MEDICINE

## 2024-12-02 PROCEDURE — 25000003 PHARM REV CODE 250

## 2024-12-02 PROCEDURE — 92960 CARDIOVERSION ELECTRIC EXT: CPT | Performed by: INTERNAL MEDICINE

## 2024-12-02 PROCEDURE — 93312 ECHO TRANSESOPHAGEAL: CPT

## 2024-12-02 PROCEDURE — D9220A PRA ANESTHESIA: Mod: CRNA,,, | Performed by: NURSE ANESTHETIST, CERTIFIED REGISTERED

## 2024-12-02 PROCEDURE — 92960 CARDIOVERSION ELECTRIC EXT: CPT | Mod: ,,, | Performed by: INTERNAL MEDICINE

## 2024-12-02 RX ORDER — SILVER SULFADIAZINE 10 G/1000G
CREAM TOPICAL DAILY
Status: DISCONTINUED | OUTPATIENT
Start: 2024-12-02 | End: 2024-12-02 | Stop reason: HOSPADM

## 2024-12-02 RX ORDER — ACETAMINOPHEN 325 MG/1
650 TABLET ORAL ONCE
Status: COMPLETED | OUTPATIENT
Start: 2024-12-02 | End: 2024-12-02

## 2024-12-02 RX ORDER — LIDOCAINE HYDROCHLORIDE 20 MG/ML
SOLUTION OROPHARYNGEAL
Status: DISCONTINUED | OUTPATIENT
Start: 2024-12-02 | End: 2024-12-02

## 2024-12-02 RX ORDER — GLUCAGON 1 MG
1 KIT INJECTION
Status: DISCONTINUED | OUTPATIENT
Start: 2024-12-02 | End: 2024-12-02 | Stop reason: HOSPADM

## 2024-12-02 RX ORDER — SODIUM CHLORIDE 0.9 % (FLUSH) 0.9 %
3 SYRINGE (ML) INJECTION
Status: DISCONTINUED | OUTPATIENT
Start: 2024-12-02 | End: 2024-12-02 | Stop reason: HOSPADM

## 2024-12-02 RX ORDER — PROPOFOL 10 MG/ML
VIAL (ML) INTRAVENOUS
Status: DISCONTINUED | OUTPATIENT
Start: 2024-12-02 | End: 2024-12-02

## 2024-12-02 RX ORDER — LIDOCAINE HYDROCHLORIDE 20 MG/ML
INJECTION, SOLUTION EPIDURAL; INFILTRATION; INTRACAUDAL; PERINEURAL
Status: DISCONTINUED | OUTPATIENT
Start: 2024-12-02 | End: 2024-12-02

## 2024-12-02 RX ADMIN — LIDOCAINE HYDROCHLORIDE 50 MG: 20 INJECTION, SOLUTION EPIDURAL; INFILTRATION; INTRACAUDAL at 11:12

## 2024-12-02 RX ADMIN — PROPOFOL 80 MG: 10 INJECTION, EMULSION INTRAVENOUS at 11:12

## 2024-12-02 RX ADMIN — PROPOFOL 150 MCG/KG/MIN: 10 INJECTION, EMULSION INTRAVENOUS at 11:12

## 2024-12-02 RX ADMIN — ACETAMINOPHEN 650 MG: 325 TABLET ORAL at 12:12

## 2024-12-02 RX ADMIN — LIDOCAINE HYDROCHLORIDE 15 ML: 20 SOLUTION ORAL at 11:12

## 2024-12-02 NOTE — ANESTHESIA PREPROCEDURE EVALUATION
12/02/2024  [facility]  Anesthesia Pre-Operative Evaluation         Patient Name: Nader Clarke  YOB: 1950  MRN: [id number]    SUBJECTIVE:     Pre-operative evaluation for Procedure(s) (LRB):  Cardioversion or Defibrillation (N/A)  Transesophageal echo (MARILYNN) intra-procedure log documentation (N/A)     12/02/2024    Nader Clarke is a 74 y.o. male w/ a pertinent PMHx of pAF here for MARILYNN/DCCV     Full medical history listed below      LDA: None documented.    Prev airway: None documented.     GTTs: None documented.        Patient Active Problem List   Diagnosis    Chronic viral hepatitis B without delta agent and without coma    Vitamin D deficiency    Primary osteoarthritis of right knee    Hx of bariatric surgery    Essential hypertension    CKD (chronic kidney disease), stage III    Paroxysmal atrial fibrillation with RVR    Nonrheumatic aortic valve stenosis    Acute CHF    Tobacco dependence due to cigarettes    Advance care planning    Situational disturbance    Macrocytic anemia    Chronic rhinosinusitis    Viral upper respiratory tract infection    Rhinitis medicamentosa       Review of patient's allergies indicates:   Allergen Reactions    Augmentin [amoxicillin-pot clavulanate] Hives and Rash       Current Inpatient Medications:      No current facility-administered medications on file prior to encounter.     Current Outpatient Medications on File Prior to Encounter   Medication Sig Dispense Refill    allopurinol (ZYLOPRIM) 100 MG tablet Take 100 mg by mouth Daily.      ascorbic Acid (VITAMIN C) 500 mg CpSR Take 500 mg by mouth once daily.      cetirizine (ZYRTEC) 5 MG tablet Take 1 tablet (5 mg total) by mouth once daily. To help w/ allergy/sinus 30 tablet 0    cyanocobalamin (VITAMIN B-12) 100 MCG tablet Take 100 mcg by mouth once daily.      doxazosin (CARDURA) 8 MG  Tab Take 8 mg by mouth every evening.  3    entecavir (BARACLUDE) 1 MG Tab Take 1 mg by mouth once daily.      ergocalciferol (ERGOCALCIFEROL) 50,000 unit Cap Take 1 capsule (50,000 Units total) by mouth every 7 days. 12 capsule 3    ferrous sulfate (SLOW RELEASE IRON) 142 mg (45 mg iron) TbSR Take 1 tablet by mouth once daily.      flecainide (TAMBOCOR) 100 MG Tab Take 1 tablet (100 mg total) by mouth 2 (two) times daily. 60 tablet 11    fluticasone propionate (FLONASE) 50 mcg/actuation nasal spray 2 sprays (100 mcg total) by Each Nostril route every evening. To help w/ allergy/sinus 16 g 0    guaiFENesin (MUCINEX) 600 mg 12 hr tablet Take 1 tablet (600 mg total) by mouth 2 (two) times daily. To help clear sinus and chest congestion 20 tablet 0    hydrALAZINE (APRESOLINE) 50 MG tablet TAKE 1 TABLET BY MOUTH THREE TIMES A DAY HOLD IF BLOOD PRESSURE IS LESS THAN 130 SYSTOLIC.  3    multivitamin with minerals tablet Take 1 tablet by mouth once daily.      oxyCODONE-acetaminophen (PERCOCET) 5-325 mg per tablet Take 1 tablet by mouth every 4 (four) hours as needed.      pantoprazole (PROTONIX) 40 MG tablet Take 1 tablet by mouth once daily.      potassium chloride SA (K-DUR,KLOR-CON) 20 MEQ tablet Take 1 tablet (20 mEq total) by mouth once daily. To help offset potassium loss associated w/ furosemide use 30 tablet 0    sodium chloride (SALINE NASAL) 0.65 % nasal spray 1 spray by Nasal route as needed for Congestion. To help reduce need for Afrin 60 mL 0       Past Surgical History:   Procedure Laterality Date    BALLOON SINUPLASTY OF PARANASAL SINUS      CARDIOVERSION N/A 10/24/2024    Procedure: Cardioversion;  Surgeon: Teo Agrawal MD;  Location: [facility] CATH LAB;  Service: Cardiology;  Laterality: N/A;    CARPUL TUNNEL      RIGHT HAND    NERVE REPAIR      RIGHT ARM    RIGHT KNEE REPLACEMENT 7/21/2015      TOTAL KNEE ARTHROPLASTY Left 2020    TRANSESOPHAGEAL ECHOCARDIOGRAM WITH POSSIBLE CARDIOVERSION (MARILYNN W/ POSS  "CARDIOVERSION) N/A 10/24/2024    Procedure: Transesophageal echo (MARILYNN) intra-procedure log documentation;  Surgeon: Teo Agrawal MD;  Location: [facility] CATH LAB;  Service: Cardiology;  Laterality: N/A;       Social History     Socioeconomic History    Marital status:    Tobacco Use    Smoking status: Every Day     Current packs/day: 2.00     Average packs/day: 2.0 packs/day for 0.9 years (1.8 ttl pk-yrs)     Types: Cigars, Cigarettes     Start date: 2024     Last attempt to quit: 7/6/2016    Smokeless tobacco: Former   Substance and Sexual Activity    Alcohol use: Yes     Alcohol/week: 2.0 standard drinks of alcohol     Types: 2 Cans of beer per week    Drug use: No    Sexual activity: Yes     Partners: Female     Social Drivers of Health     Financial Resource Strain: Medium Risk (11/11/2024)    Overall Financial Resource Strain (CARDIA)     Difficulty of Paying Living Expenses: Somewhat hard   Food Insecurity: No Food Insecurity (11/11/2024)    Hunger Vital Sign     Worried About Running Out of Food in the Last Year: Never true     Ran Out of Food in the Last Year: Never true   Transportation Needs: No Transportation Needs (10/26/2024)    TRANSPORTATION NEEDS     Transportation : No   Physical Activity: Sufficiently Active (11/11/2024)    Exercise Vital Sign     Days of Exercise per Week: 7 days     Minutes of Exercise per Session: 30 min   Stress: No Stress Concern Present (11/11/2024)    [employer] - Occupational Stress Questionnaire     Feeling of Stress : Only a little   Housing Stability: Low Risk  (11/11/2024)    Housing Stability Vital Sign     Unable to Pay for Housing in the Last Year: No     Homeless in the Last Year: No       OBJECTIVE:     Vital Signs Range (Last 24H):  Temp:  [36.4 °C (97.5 °F)]   Pulse:  [115]   Resp:  [18]   BP: (109-111)/(67-73)   SpO2:  [99 %]       CBC:   No results for input(s): "WBC", "RBC", "HGB", "HCT", "PLT", "MCV", "MCH", "MCHC" in " "the last 72 hours.    CMP: No results for input(s): "NA", "K", "CL", "CO2", "BUN", "CREATININE", "GLU", "MG", "PHOS", "CALCIUM", "ALBUMIN", "PROT", "ALKPHOS", "ALT", "AST", "BILITOT" in the last 72 hours.    INR:  No results for input(s): "PT", "INR", "PROTIME", "APTT" in the last 72 hours.    Diagnostic Studies: No relevant studies.    EKG:   Results for orders placed or performed in visit on 11/27/24   EKG 12-lead    Collection Time: 11/27/24  2:07 PM   Result Value Ref Range    QRS Duration 104 ms    [facility] QTC Calculation 482 ms    Narrative    Test Reason :    Vent. Rate :  91 BPM     Atrial Rate : 308 BPM     P-R Int :    ms          QRS Dur : 104 ms      QT Int : 392 ms       P-R-T Axes :      4  -7 degrees    QTcB Int : 482 ms    Atrial fibrillation    Nonspecific ST and T wave abnormality  Prolonged QT  Abnormal ECG  When compared with ECG of  11/27/24 1407  No significant change was found    Confirmed by Teo Agrawal ([id number]) on 11/28/2024 9:46:46 AM    Referred By: CAMERON           Confirmed By: Teo Agrawal        2D ECHO:   No results found for this or any previous visit.         ASSESSMENT/PLAN:           Pre-op Assessment    I have reviewed the Patient Summary Reports.     I have reviewed the Nursing Notes. I have reviewed the NPO Status.   I have reviewed the Medications.     Review of Systems  Anesthesia Hx:   History of prior surgery of interest to airway management or planning:          Denies Family Hx of Anesthesia complications.    Denies Personal Hx of Anesthesia complications.                        Physical Exam  General: Well nourished, Cooperative, Alert and Oriented    Airway:  Mallampati: II   Mouth Opening: Normal  TM Distance: Normal  Tongue: Normal  Neck ROM: Normal ROM    Dental:  Intact    Chest/Lungs:  Clear to auscultation, Normal Respiratory Rate    Heart:  Rate: Tachycardia  Rhythm: Irregularly Irregular        Anesthesia Plan  Type of Anesthesia, risks & benefits " discussed:    Anesthesia Type: Gen ETT  Intra-op Monitoring Plan: Standard ASA Monitors  Post Op Pain Control Plan: multimodal analgesia and IV/PO Opioids PRN  Induction:  IV  Airway Plan: Video  Informed Consent: Informed consent signed with the Patient and all parties understand the risks and agree with anesthesia plan.  All questions answered.   ASA Score: 3  Day of Surgery Review of History & Physical: H&P Update referred to the surgeon/provider.    Ready For Surgery From Anesthesia Perspective.     .

## 2024-12-02 NOTE — DISCHARGE INSTRUCTIONS
Medications:  -Continue to take your home medications as listed on your medication list after you are discharged.    New Medications:  -None    Diet  -You may resume oral intake after you are discharged, as long you have no swallowing difficulties.    Because you have received sedation for this procedure:  -Limit activity for the remainder of the day.  -Do not smoke for at least 6 hours and until you are fully awake and alert.  -Do not drink alcoholic beverage for 24 hours.  -Do not drive for 24 hours.  -Defer important decision making until the following day.     Go to the Emergency Department if you develop:   -Bleeding  -Weakness or numbness  -Visual, gait or speech disturbance  -New chest pain, palpitations, shortness of breath, rapid heart beat, or fainting  -Fever    Follow up:  -EKG in 1 week.  -Dr. Giles or Mira Wagner NP in 1 month. Call or message the office to schedule.      Any need to reschedule or cancel procedures, or any questions regarding your procedures should be addressed directly with the Arrhythmia Department Nurses at the following phone number: [phone].

## 2024-12-02 NOTE — PLAN OF CARE
Patient arrived to room. PIV placed. Admit assessment completed. Plan of care discussed with patient and daughter. Call light in reach.

## 2024-12-02 NOTE — H&P
[facility]  Cardiology  MARILYNN/DCCV History & Physical      Nader Clarke  YOB: 1950  Medical Record Number:  [id number]  Attending Physician:  Teo Giles MD   Date of Admission: 12/2/2024       Hospital Day:  0  Current Principal Problem:  <principal problem not specified>    Patient information was obtained from patient and past medical records.  History     Cc: MARILYNN/DCCV for AFL    HPI  Last OV with Dr. Giles on 11/12/24.   74 yoM referred for AF management. He has AF incidentally discovered and was referred to cardiology. He underwent MARILYNN/CV and was started on eliquis and lopressor. He was normal EF. He was in SR the next day but was back in AF one week later. Today he is in AFL. No syncope or near syncope. Fatigue and MEZA.      MARILYNN/CV 10/24/24:  Normal biventricular size/fxn, LVEF 55%.  Normal wall motion.  Mod Ca++ AS, SASHA 1.4cm2 by planimetry.  Mod AI.  MAC with mod MR  Mild TR  Biatrial enlargement  No LA/SURAJ thrombus     Successful DCCV AF->NSR 78 BPM 200J x1.     My interpretation of the ECG is:  AFL 2:1       Today, in good spirits. Accompanied by his daughter.    Rate/rhythm control: metoprolol 100 mg BID/Flecainide 100 mg BID  Anticoagulant/antiplatelets: Eliquis 5 mg BID  ECG: Atrial flutter at 98 bpm   Platelet count: 220  INR: 1.1    History of stroke:  no  Dysphagia or odynophagia:  no  Liver Disease, esophageal disease, or known varices:  no  Upper GI Bleeding:  no  Snoring:  no   Sleep Apnea:  no  Prior neck surgery or radiation:  no  History of anesthetic difficulties:  no  Family history of anesthetic difficulties:  no  Last oral intake: last pm   Able to move neck in all directions:  yes  GLP-1 Use: no      Medications - Outpatient  Prior to Admission medications    Medication Sig Start Date End Date Taking? Authorizing Provider   allopurinol (ZYLOPRIM) 100 MG tablet Take 100 mg by mouth Daily.    Provider, Historical   apixaban (ELIQUIS) 5 mg  Tab Take 1 tablet (5 mg total) by mouth 2 (two) times daily. To prevent blood clots in heart and strokes 11/26/24   Hanane Ireland, [facility]   ascorbic Acid (VITAMIN C) 500 mg CpSR Take 500 mg by mouth once daily.    Provider, Historical   cetirizine (ZYRTEC) 5 MG tablet Take 1 tablet (5 mg total) by mouth once daily. To help w/ allergy/sinus 10/26/24   Tad Rebolledo MD   cyanocobalamin (VITAMIN B-12) 100 MCG tablet Take 100 mcg by mouth once daily.    Provider, Historical   doxazosin (CARDURA) 8 MG Tab Take 8 mg by mouth every evening. 1/12/17   Provider, Historical   entecavir (BARACLUDE) 1 MG Tab Take 1 mg by mouth once daily. 10/1/24   Provider, Historical   ergocalciferol (ERGOCALCIFEROL) 50,000 unit Cap Take 1 capsule (50,000 Units total) by mouth every 7 days. 12/14/17   Shashank Tran MD   ferrous sulfate (SLOW RELEASE IRON) 142 mg (45 mg iron) TbSR Take 1 tablet by mouth once daily.    Provider, Historical   flecainide (TAMBOCOR) 100 MG Tab Take 1 tablet (100 mg total) by mouth 2 (two) times daily. 11/12/24 11/7/25  Teo Giles MD   fluticasone propionate (FLONASE) 50 mcg/actuation nasal spray 2 sprays (100 mcg total) by Each Nostril route every evening. To help w/ allergy/sinus 10/26/24   Tad Rebolledo MD   furosemide (LASIX) 40 MG tablet Take 1 tablet (40 mg total) by mouth once daily.  Patient taking differently: Take 40 mg by mouth 2 (two) times a day. 11/20/24   Teo Agrawal MD   guaiFENesin (MUCINEX) 600 mg 12 hr tablet Take 1 tablet (600 mg total) by mouth 2 (two) times daily. To help clear sinus and chest congestion 10/26/24   Tad Rebolledo MD   hydrALAZINE (APRESOLINE) 50 MG tablet TAKE 1 TABLET BY MOUTH THREE TIMES A DAY HOLD IF BLOOD PRESSURE IS LESS THAN 130 SYSTOLIC. 3/8/17   Provider, Historical   metoprolol tartrate (LOPRESSOR) 100 MG tablet Take 1 tablet (100 mg total) by mouth 2 (two) times a day. For blood pressure and heart rate control  11/20/24 11/20/25  Teo Agrawal MD   multivitamin with minerals tablet Take 1 tablet by mouth once daily.    Provider, Historical   oxyCODONE-acetaminophen (PERCOCET) 5-325 mg per tablet Take 1 tablet by mouth every 4 (four) hours as needed. 7/26/24   Provider, Historical   pantoprazole (PROTONIX) 40 MG tablet Take 1 tablet by mouth once daily. 7/8/24   Provider, Historical   potassium chloride SA (K-DUR,KLOR-CON) 20 MEQ tablet Take 1 tablet (20 mEq total) by mouth once daily. To help offset potassium loss associated w/ furosemide use 10/31/24   Hanane Ireland, FNP-BC   sodium chloride (SALINE NASAL) 0.65 % nasal spray 1 spray by Nasal route as needed for Congestion. To help reduce need for Afrin 10/26/24   Tad Rebolledo MD       Medications - Current  Scheduled Meds:  Continuous Infusions:  PRN Meds:.      Allergies  Review of patient's allergies indicates:   Allergen Reactions    Augmentin [amoxicillin-pot clavulanate] Hives and Rash       Past Medical History  Past Medical History:   Diagnosis Date    Arthritis of big toe     RIGHT FOOT BIG TOE    Chronic hepatitis B     Chronic rhinosinusitis     Gout     Hypertension     Vitamin D deficiency        Past Surgical History  Past Surgical History:   Procedure Laterality Date    BALLOON SINUPLASTY OF PARANASAL SINUS      CARDIOVERSION N/A 10/24/2024    Procedure: Cardioversion;  Surgeon: Teo Agrawal MD;  Location: [facility] CATH LAB;  Service: Cardiology;  Laterality: N/A;    CARPUL TUNNEL      RIGHT HAND    NERVE REPAIR      RIGHT ARM    RIGHT KNEE REPLACEMENT 7/21/2015      TOTAL KNEE ARTHROPLASTY Left 2020    TRANSESOPHAGEAL ECHOCARDIOGRAM WITH POSSIBLE CARDIOVERSION (MARILYNN W/ POSS CARDIOVERSION) N/A 10/24/2024    Procedure: Transesophageal echo (MARILYNN) intra-procedure log documentation;  Surgeon: Teo Agrawal MD;  Location: [facility] CATH LAB;  Service: Cardiology;  Laterality: N/A;       Social History  Social History      Socioeconomic History    Marital status:    Tobacco Use    Smoking status: Every Day     Current packs/day: 2.00     Average packs/day: 2.0 packs/day for 0.9 years (1.8 ttl pk-yrs)     Types: Cigars, Cigarettes     Start date: 2024     Last attempt to quit: 7/6/2016    Smokeless tobacco: Former   Substance and Sexual Activity    Alcohol use: Yes     Alcohol/week: 2.0 standard drinks of alcohol     Types: 2 Cans of beer per week    Drug use: No    Sexual activity: Yes     Partners: Female     Social Drivers of Health     Financial Resource Strain: Medium Risk (11/11/2024)    Overall Financial Resource Strain (CARDIA)     Difficulty of Paying Living Expenses: Somewhat hard   Food Insecurity: No Food Insecurity (11/11/2024)    Hunger Vital Sign     Worried About Running Out of Food in the Last Year: Never true     Ran Out of Food in the Last Year: Never true   Transportation Needs: No Transportation Needs (10/26/2024)    TRANSPORTATION NEEDS     Transportation : No   Physical Activity: Sufficiently Active (11/11/2024)    Exercise Vital Sign     Days of Exercise per Week: 7 days     Minutes of Exercise per Session: 30 min   Stress: No Stress Concern Present (11/11/2024)    [employer] [place] - Occupational Stress Questionnaire     Feeling of Stress : Only a little   Housing Stability: Low Risk  (11/11/2024)    Housing Stability Vital Sign     Unable to Pay for Housing in the Last Year: No     Homeless in the Last Year: No       ROS  Review of Systems   Constitutional: Negative for chills.   HENT: Negative.     Eyes: Negative.    Cardiovascular:  Negative for chest pain, dyspnea on exertion and palpitations.   Respiratory: Negative.  Negative for shortness of breath and sleep disturbances due to breathing.    Endocrine: Negative.    Musculoskeletal: Negative.    Gastrointestinal:  Negative for hematemesis, melena, nausea and vomiting.   Genitourinary: Negative.    Neurological: Negative.     Psychiatric/Behavioral: Negative.  Negative for altered mental status.    Allergic/Immunologic: Negative.    Physical Examination     Vital Signs  24 Hour VS Range    Temp:  [97.5 °F (36.4 °C)]   Pulse:  [115]   Resp:  [18]   BP: (109-111)/(67-73)   SpO2:  [99 %]   No intake or output data in the 24 hours ending 12/02/24 1109      Physical Exam:   Physical Exam  Constitutional:       General: He is not in acute distress.     Appearance: Normal appearance. He is not ill-appearing.   HENT:      Head: Normocephalic and atraumatic.      Nose: Nose normal. No congestion or rhinorrhea.      Mouth/Throat:      Mouth: Mucous membranes are moist.      Pharynx: Oropharynx is clear. No oropharyngeal exudate or posterior oropharyngeal erythema.   Eyes:      General:         Right eye: No discharge.         Left eye: No discharge.      Extraocular Movements: Extraocular movements intact.      Conjunctiva/sclera: Conjunctivae normal.   Cardiovascular:      Rate and Rhythm: Normal rate. Rhythm irregular.   Pulmonary:      Effort: Pulmonary effort is normal. No respiratory distress.      Breath sounds: Normal breath sounds. No wheezing or rales.   Musculoskeletal:         General: No swelling. Normal range of motion.      Cervical back: Normal range of motion. No rigidity or tenderness.      Right lower leg: No edema.      Left lower leg: No edema.   Skin:     General: Skin is warm and dry.      Coloration: Skin is not jaundiced.      Findings: No bruising or lesion.   Neurological:      General: No focal deficit present.      Mental Status: He is alert and oriented to person, place, and time. Mental status is at baseline.   Psychiatric:         Mood and Affect: Mood normal.         Behavior: Behavior normal.         Thought Content: Thought content normal.         Judgment: Judgment normal.         Data       Recent Labs   Lab 11/27/24  1228   WBC 11.51   HGB 12.0*   HCT 35.4*           Recent Labs   Lab 11/27/24  1228  "  INR 1.1        Recent Labs   Lab 11/27/24  1228      K 3.9      CO2 21*   BUN 39*   CREATININE 2.0*   ANIONGAP 10   CALCIUM 8.7        Recent Labs   Lab 11/27/24  1228   PROT 6.8   ALBUMIN 3.5   BILITOT 0.4   ALKPHOS 80   AST 21   ALT 17        No results for input(s): "TROPONINI" in the last 168 hours.     BNP (pg/mL)   Date Value   11/27/2024 1,119 (H)   11/13/2024 759 (H)   11/06/2024 670 (H)   10/31/2024 986 (H)   10/24/2024 594 (H)       No results for input(s): "LABBLOO" in the last 168 hours.     Assessment & Plan     #Atrial flutter   -patient presents for MARILYNN/DCCV for AFL  -on Eliquis for CVA, last dose this morning       Dr. Giles Plan from 11/12/24:   Will start flecainide and reattempt MARILYNN/CV in 2 weeks     MARILYNN 10/24/24  Normal biventricular size/fxn, LVEF 55%.  Normal wall motion.  Mod Ca++ AS, SASHA 1.4cm2 by planimetry.  Mod AI.  MAC with mod MR  Mild TR  Biatrial enlargement  No LA/SURAJ thrombus     Successful DCCV AF->NSR 78 BPM 200J x1.      -No absolute contraindications of esophageal stricture, tumor, perforation, laceration,or diverticulum and/or active GI bleed.  -The risks, benefits & alternatives of the procedure were explained to the patient.   -The risks of transesophageal echo include but are not limited to:  Dental trauma, esophageal trauma/perforation, bleeding, laryngospasm/brochospasm, aspiration, sore throat/hoarseness, & dislodgement of the endotracheal tube/nasogastric tube (where applicable).    -The risks of moderate sedation include hypotension, respiratory depression, arrhythmias, bronchospasm, & death.    -Prior to procedure, extensive discussion with patient regarding risks and benefits of DCCV at bedside today. The patient voices understanding, all questions have been answered, and patient would like to proceed.  -Informed consent was obtained. The patient is agreeable to proceed with the procedure and all questions and concerns addressed.    Case was discussed " with an attending physician prior to procedure.    Dinorah Delvalle PA-C  [facility]

## 2024-12-02 NOTE — PLAN OF CARE
Received report from ALEXY Doll. Patient s/p MARILYNN/DCCV, AAOx3. VSS, no c/o pain or discomfort at this time, resp even and unlabored. Post procedure protocol reviewed with patient and patient's family. Understanding verbalized. Family members at bedside. Nurse call bell within reach. Will continue to monitor per post procedure protocol.

## 2024-12-02 NOTE — NURSING
Pt DC'd per orders.  DC paperwork reviewed w pt and daughter.  Pt verbalized DC instructions.    IV removed. Tele DC'd.  Pt was able to drink, eat, walk, and urinate with no difficulties.    Pt being wheeled off unit per transport in wheelchair.

## 2024-12-02 NOTE — HOSPITAL COURSE
Patient underwent MARILYNN without evidence of SURAJ thrombus. Proceeded with DCCV, converting from atrial flutter to sinus rhythm. Patient tolerated the procedure without any acute complications. Post-DCCV ECG revealed sinus rhythm at 64 bpm. Plan to continue all home medications including Eliquis 5 mg BID, Flecainide 100 mg BID, metoprolol 100 mg BID. Instructed to return in 1 week for follow up ECG and in 4 weeks for clinic follow up with Dr. Giles or Mira Wagner NP.   Patient was assessed at bedside prior to discharge, they reported feeling well and denied chest discomfort, shortness of breath, palpitations, lightheadedness, or any other acute symptoms. Discharge instructions were discussed with patient and all questions were answered. Patient was discharged home in stable condition.

## 2024-12-02 NOTE — DISCHARGE SUMMARY
[facility]  Cardiology  Discharge Summary      Patient Name: Nader Clarke  MRN: [id number]  Admission Date: 12/2/2024  Hospital Length of Stay: 0 days  Discharge Date and Time: 12/2/2024  1:27 PM  Attending Physician: Teo Giles MD    Discharging Provider: Dinorah Delvalle PA-C  Primary Care Physician: Hang Membreno MD    HPI:   Last OV with Dr. Giles on 11/12/24.   74 yoM referred for AF management. He has AF incidentally discovered and was referred to cardiology. He underwent MARILYNN/CV and was started on eliquis and lopressor. He was normal EF. He was in SR the next day but was back in AF one week later. Today he is in AFL. No syncope or near syncope. Fatigue and MEZA.      MARILYNN/CV 10/24/24:  Normal biventricular size/fxn, LVEF 55%.  Normal wall motion.  Mod Ca++ AS, SASHA 1.4cm2 by planimetry.  Mod AI.  MAC with mod MR  Mild TR  Biatrial enlargement  No LA/SURAJ thrombus     Successful DCCV AF->NSR 78 BPM 200J x1.     My interpretation of the ECG is:  AFL 2:1        Today, in good spirits. Accompanied by his daughter.    Procedure(s) (LRB):  Cardioversion or Defibrillation (N/A)  Transesophageal echo (MARILYNN) intra-procedure log documentation (N/A)     Indwelling Lines/Drains at time of discharge:  Lines/Drains/Airways       None     Hospital Course:  Patient underwent MARILYNN without evidence of SURAJ thrombus. Proceeded with DCCV, converting from atrial flutter to sinus rhythm. Patient tolerated the procedure without any acute complications. Post-DCCV ECG revealed sinus rhythm at 64 bpm. Plan to continue all home medications including Eliquis 5 mg BID, Flecainide 100 mg BID, metoprolol 100 mg BID. Instructed to return in 1 week for follow up ECG and in 4 weeks for clinic follow up with Dr. Giles or Mira Wagner NP.   Patient was assessed at bedside prior to discharge, they reported feeling well and denied chest discomfort, shortness of breath, palpitations, lightheadedness, or any other acute  symptoms. Discharge instructions were discussed with patient and all questions were answered. Patient was discharged home in stable condition.       Goals of Care Treatment Preferences:  Code Status: DNR      Procedure: Electrophysiology Procedure  Result Date: 12/2/2024    Cardioversion was successfully performed with restoration of normal sinus rhythm. I certify that I was present for the critical steps of the procedure including the diagnostic, surgical and/or interventional portions. Procedure Log documented by No documenter listed and verified by Jeff Lee MD. Date: 12/2/2024  Time: 12:28 PM       Significant Diagnostic Studies: Cardiac Graphics: ECG: Sinus rhythm with PAC's at 65 bpm     Pending Diagnostic Studies:       None            There are no hospital problems to display for this patient.    No new Assessment & Plan notes have been filed under this hospital service since the last note was generated.  Service: Cardiology      Discharged Condition: stable    Disposition: Home or Self Care    Follow Up:   Follow-up Information       Mira Wagner NP. Schedule an appointment as soon as possible for a visit in 1 month(s).    Specialty: Cardiology  Contact information:  [address]  [facility] [id number] [phone]               Teo Giles MD. Schedule an appointment as soon as possible for a visit in 1 month(s).    Specialties: Electrophysiology, Cardiovascular Disease, Cardiology  Contact information:  [address] [facility] [id number] [phone]                           Patient Instructions:   No discharge procedures on file.  Medications:  Reconciled Home Medications:      Medication List        CONTINUE taking these medications      allopurinoL 100 MG tablet  Commonly known as: ZYLOPRIM  Take 100 mg by mouth Daily.     apixaban 5 mg Tab  Commonly known as: ELIQUIS  Take 1 tablet (5 mg total) by mouth 2 (two) times daily. To prevent blood clots in heart and  strokes     ascorbic Acid 500 mg Cpsr  Commonly known as: VITAMIN C  Take 500 mg by mouth once daily.     cetirizine 5 MG tablet  Commonly known as: ZYRTEC  Take 1 tablet (5 mg total) by mouth once daily. To help w/ allergy/sinus     doxazosin 8 MG Tab  Commonly known as: CARDURA  Take 8 mg by mouth every evening.     entecavir 1 MG Tab  Commonly known as: BARACLUDE  Take 1 mg by mouth once daily.     ergocalciferol 50,000 unit Cap  Commonly known as: ERGOCALCIFEROL  Take 1 capsule (50,000 Units total) by mouth every 7 days.     ferrous sulfate 142 mg (45 mg iron) Tbsr  Commonly known as: SLOW RELEASE IRON  Take 1 tablet by mouth once daily.     flecainide 100 MG Tab  Commonly known as: TAMBOCOR  Take 1 tablet (100 mg total) by mouth 2 (two) times daily.     fluticasone propionate 50 mcg/actuation nasal spray  Commonly known as: FLONASE  2 sprays (100 mcg total) by Each Nostril route every evening. To help w/ allergy/sinus     furosemide 40 MG tablet  Commonly known as: LASIX  Take 1 tablet (40 mg total) by mouth once daily.     guaiFENesin 600 mg 12 hr tablet  Commonly known as: MUCINEX  Take 1 tablet (600 mg total) by mouth 2 (two) times daily. To help clear sinus and chest congestion     hydrALAZINE 50 MG tablet  Commonly known as: APRESOLINE  TAKE 1 TABLET BY MOUTH THREE TIMES A DAY HOLD IF BLOOD PRESSURE IS LESS THAN 130 SYSTOLIC.     metoprolol tartrate 100 MG tablet  Commonly known as: LOPRESSOR  Take 1 tablet (100 mg total) by mouth 2 (two) times a day. For blood pressure and heart rate control     multivitamin with minerals tablet  Take 1 tablet by mouth once daily.     oxyCODONE-acetaminophen 5-325 mg per tablet  Commonly known as: PERCOCET  Take 1 tablet by mouth every 4 (four) hours as needed.     pantoprazole 40 MG tablet  Commonly known as: PROTONIX  Take 1 tablet by mouth once daily.     potassium chloride SA 20 MEQ tablet  Commonly known as: K-DUR,KLOR-CON  Take 1 tablet (20 mEq total) by mouth once  daily. To help offset potassium loss associated w/ furosemide use     Saline NasaL 0.65 % nasal spray  Generic drug: sodium chloride  1 spray by Nasal route as needed for Congestion. To help reduce need for Afrin     VITAMIN B-12 100 MCG tablet  Generic drug: cyanocobalamin  Take 100 mcg by mouth once daily.              Time spent on the discharge of patient: 35 minutes    Dinorah Delvalle PA-C  Cardiology  [facility]

## 2024-12-02 NOTE — TRANSFER OF CARE
Anesthesia Transfer of Care Note    Patient: Nader Clarke    Procedure(s) Performed: Procedure(s) (LRB):  Cardioversion or Defibrillation (N/A)  Transesophageal echo (MARILYNN) intra-procedure log documentation (N/A)    Patient location: Cath Lab    Anesthesia Type: MAC    Transport from OR: Transported from OR on room air with adequate spontaneous ventilation    Post pain: adequate analgesia    Post assessment: no apparent anesthetic complications    Post vital signs: stable    Level of consciousness: awake and alert    Nausea/Vomiting: no nausea/vomiting    Complications: none    Transfer of care protocol was followed    Last vitals: Visit Vitals  /67 (BP Location: Right arm, Patient Position: Sitting)   Pulse (!) 115   Temp 36.4 °C (97.5 °F) (Temporal)   Resp 18   Ht 6' (1.829 m)   Wt 86.2 kg (190 lb)   SpO2 99%   BMI 25.77 kg/m²

## 2024-12-03 ENCOUNTER — DOCUMENTATION ONLY (OUTPATIENT)
Facility: CLINIC | Age: 74
End: 2024-12-03
Payer: MEDICARE

## 2024-12-04 ENCOUNTER — TELEPHONE (OUTPATIENT)
Facility: CLINIC | Age: 74
End: 2024-12-04

## 2024-12-04 ENCOUNTER — LAB VISIT (OUTPATIENT)
Dept: LAB | Facility: HOSPITAL | Age: 74
End: 2024-12-04
Payer: MEDICARE

## 2024-12-04 ENCOUNTER — OFFICE VISIT (OUTPATIENT)
Facility: CLINIC | Age: 74
End: 2024-12-04
Payer: MEDICARE

## 2024-12-04 VITALS
DIASTOLIC BLOOD PRESSURE: 60 MMHG | BODY MASS INDEX: 27.56 KG/M2 | WEIGHT: 203.5 LBS | OXYGEN SATURATION: 97 % | HEART RATE: 64 BPM | RESPIRATION RATE: 18 BRPM | SYSTOLIC BLOOD PRESSURE: 110 MMHG | HEIGHT: 72 IN

## 2024-12-04 DIAGNOSIS — R06.02 SOB (SHORTNESS OF BREATH): ICD-10-CM

## 2024-12-04 DIAGNOSIS — I48.0 PAROXYSMAL ATRIAL FIBRILLATION WITH RVR: ICD-10-CM

## 2024-12-04 DIAGNOSIS — I35.0 NONRHEUMATIC AORTIC VALVE STENOSIS: ICD-10-CM

## 2024-12-04 DIAGNOSIS — N18.32 STAGE 3B CHRONIC KIDNEY DISEASE: ICD-10-CM

## 2024-12-04 DIAGNOSIS — F17.210 TOBACCO DEPENDENCE DUE TO CIGARETTES: ICD-10-CM

## 2024-12-04 DIAGNOSIS — I10 ESSENTIAL HYPERTENSION: ICD-10-CM

## 2024-12-04 DIAGNOSIS — I50.30 HEART FAILURE WITH PRESERVED EJECTION FRACTION, UNSPECIFIED HF CHRONICITY: Primary | ICD-10-CM

## 2024-12-04 LAB
ALBUMIN SERPL BCP-MCNC: 3.2 G/DL (ref 3.5–5.2)
ALP SERPL-CCNC: 147 U/L (ref 40–150)
ALT SERPL W/O P-5'-P-CCNC: 39 U/L (ref 10–44)
ANION GAP SERPL CALC-SCNC: 11 MMOL/L (ref 8–16)
AST SERPL-CCNC: 27 U/L (ref 10–40)
BASOPHILS # BLD AUTO: 0.1 K/UL (ref 0–0.2)
BASOPHILS NFR BLD: 0.8 % (ref 0–1.9)
BILIRUB SERPL-MCNC: 0.7 MG/DL (ref 0.1–1)
BNP SERPL-MCNC: 815 PG/ML (ref 0–99)
BUN SERPL-MCNC: 56 MG/DL (ref 8–23)
CALCIUM SERPL-MCNC: 8.7 MG/DL (ref 8.7–10.5)
CHLORIDE SERPL-SCNC: 106 MMOL/L (ref 95–110)
CO2 SERPL-SCNC: 19 MMOL/L (ref 23–29)
CREAT SERPL-MCNC: 3 MG/DL (ref 0.5–1.4)
DIFFERENTIAL METHOD BLD: ABNORMAL
EOSINOPHIL # BLD AUTO: 0.2 K/UL (ref 0–0.5)
EOSINOPHIL NFR BLD: 1.4 % (ref 0–8)
ERYTHROCYTE [DISTWIDTH] IN BLOOD BY AUTOMATED COUNT: 13.4 % (ref 11.5–14.5)
EST. GFR  (NO RACE VARIABLE): 21 ML/MIN/1.73 M^2
GLUCOSE SERPL-MCNC: 153 MG/DL (ref 70–110)
HCT VFR BLD AUTO: 32.9 % (ref 40–54)
HGB BLD-MCNC: 10.7 G/DL (ref 14–18)
IMM GRANULOCYTES # BLD AUTO: 0.07 K/UL (ref 0–0.04)
IMM GRANULOCYTES NFR BLD AUTO: 0.6 % (ref 0–0.5)
LYMPHOCYTES # BLD AUTO: 1.4 K/UL (ref 1–4.8)
LYMPHOCYTES NFR BLD: 11.4 % (ref 18–48)
MAGNESIUM SERPL-MCNC: 2.1 MG/DL (ref 1.6–2.6)
MCH RBC QN AUTO: 33.2 PG (ref 27–31)
MCHC RBC AUTO-ENTMCNC: 32.5 G/DL (ref 32–36)
MCV RBC AUTO: 102 FL (ref 82–98)
MONOCYTES # BLD AUTO: 0.9 K/UL (ref 0.3–1)
MONOCYTES NFR BLD: 7.1 % (ref 4–15)
NEUTROPHILS # BLD AUTO: 9.8 K/UL (ref 1.8–7.7)
NEUTROPHILS NFR BLD: 78.7 % (ref 38–73)
NRBC BLD-RTO: 0 /100 WBC
PHOSPHATE SERPL-MCNC: 4.6 MG/DL (ref 2.7–4.5)
PLATELET # BLD AUTO: 264 K/UL (ref 150–450)
PMV BLD AUTO: 10.1 FL (ref 9.2–12.9)
POTASSIUM SERPL-SCNC: 3.8 MMOL/L (ref 3.5–5.1)
PROT SERPL-MCNC: 6.7 G/DL (ref 6–8.4)
RBC # BLD AUTO: 3.22 M/UL (ref 4.6–6.2)
SODIUM SERPL-SCNC: 136 MMOL/L (ref 136–145)
WBC # BLD AUTO: 12.5 K/UL (ref 3.9–12.7)

## 2024-12-04 PROCEDURE — 83880 ASSAY OF NATRIURETIC PEPTIDE: CPT

## 2024-12-04 PROCEDURE — 83735 ASSAY OF MAGNESIUM: CPT

## 2024-12-04 PROCEDURE — 80053 COMPREHEN METABOLIC PANEL: CPT

## 2024-12-04 PROCEDURE — 99215 OFFICE O/P EST HI 40 MIN: CPT | Mod: S$GLB,,,

## 2024-12-04 PROCEDURE — 36415 COLL VENOUS BLD VENIPUNCTURE: CPT

## 2024-12-04 PROCEDURE — 99999 PR PBB SHADOW E&M-EST. PATIENT-LVL V: CPT | Mod: PBBFAC,,,

## 2024-12-04 PROCEDURE — 3008F BODY MASS INDEX DOCD: CPT | Mod: CPTII,S$GLB,,

## 2024-12-04 PROCEDURE — 84100 ASSAY OF PHOSPHORUS: CPT

## 2024-12-04 PROCEDURE — 1160F RVW MEDS BY RX/DR IN RCRD: CPT | Mod: CPTII,S$GLB,,

## 2024-12-04 PROCEDURE — 85025 COMPLETE CBC W/AUTO DIFF WBC: CPT

## 2024-12-04 PROCEDURE — 4010F ACE/ARB THERAPY RXD/TAKEN: CPT | Mod: CPTII,S$GLB,,

## 2024-12-04 PROCEDURE — 1159F MED LIST DOCD IN RCRD: CPT | Mod: CPTII,S$GLB,,

## 2024-12-04 PROCEDURE — 3078F DIAST BP <80 MM HG: CPT | Mod: CPTII,S$GLB,,

## 2024-12-04 PROCEDURE — 3074F SYST BP LT 130 MM HG: CPT | Mod: CPTII,S$GLB,,

## 2024-12-04 NOTE — ANESTHESIA POSTPROCEDURE EVALUATION
Anesthesia Post Evaluation    Patient: Nader Clarke    Procedure(s) Performed: Procedure(s) (LRB):  Cardioversion or Defibrillation (N/A)  Transesophageal echo (MARILYNN) intra-procedure log documentation (N/A)    Final Anesthesia Type: general      Patient location during evaluation: PACU  Patient participation: Yes- Able to Participate  Level of consciousness: awake and alert and oriented  Post-procedure vital signs: reviewed and stable  Pain management: adequate  Airway patency: patent  SONU mitigation strategies: Intraoperative administration of CPAP, nasopharyngeal airway, or oral appliance during sedation, Multimodal analgesia, Extubation while patient is awake, Verification of full reversal of neuromuscular block and Extubation and recovery carried out in lateral, semiupright, or other nonsupine position  PONV status at discharge: No PONV  Anesthetic complications: no      Cardiovascular status: hemodynamically stable  Respiratory status: unassisted  Hydration status: euvolemic  Follow-up not needed.              Vitals Value Taken Time   /72 12/02/24 1302   Temp 36.7 °C (98 °F) 12/02/24 1300   Pulse 61 12/02/24 1309   Resp 22 12/02/24 1307   SpO2 97 % 12/02/24 1309   Vitals shown include unfiled device data.      No case tracking events are documented in the log.      Pain/Leodan Score: No data recorded

## 2024-12-04 NOTE — TELEPHONE ENCOUNTER
Patient called back with scale reading. Patient maintains 190lbs on home scale. Will weight tomorrow and notify clinic of results. Instructed to call clinic with notification of weight gain of 2-3 lbs on home scale.

## 2024-12-04 NOTE — TELEPHONE ENCOUNTER
Called and discussed lab results and nephrology recommendation with patient. Patient to hold lasix and repeat labs on Friday. Instructed to call clinic with any questions or concerns. And to report to ED for worsening SOB

## 2024-12-04 NOTE — PATIENT INSTRUCTIONS
Activity and Diet restrictions:   Recommend 2-3 gram sodium restriction and 1500cc- 2000cc fluid restriction.  Call clinic for increased shortness of breath, or increased swelling.  Weigh yourself every day and call the office if your weight increases by more than 3 lbs in 1 day or 5 lbs in 3 days.     Clinic will call today with further instructions

## 2024-12-05 ENCOUNTER — TELEPHONE (OUTPATIENT)
Facility: CLINIC | Age: 74
End: 2024-12-05
Payer: MEDICARE

## 2024-12-05 NOTE — TELEPHONE ENCOUNTER
Spoke with patient today regarding lasix hold. States weight is stable, no swelling in legs and SOB is unchanged. Message sent to EP as patient expresses his SOB and fatigue have worsened since introduction of new antiarrhythmic. Patient reminded to repeat labs tomorrow to reassess renal function.

## 2024-12-06 ENCOUNTER — TELEPHONE (OUTPATIENT)
Facility: CLINIC | Age: 74
End: 2024-12-06
Payer: MEDICARE

## 2024-12-06 ENCOUNTER — LAB VISIT (OUTPATIENT)
Dept: LAB | Facility: HOSPITAL | Age: 74
End: 2024-12-06
Payer: MEDICARE

## 2024-12-06 ENCOUNTER — PATIENT MESSAGE (OUTPATIENT)
Facility: CLINIC | Age: 74
End: 2024-12-06
Payer: MEDICARE

## 2024-12-06 DIAGNOSIS — R06.02 SOB (SHORTNESS OF BREATH): ICD-10-CM

## 2024-12-06 LAB
ALBUMIN SERPL BCP-MCNC: 3.1 G/DL (ref 3.5–5.2)
ALP SERPL-CCNC: 147 U/L (ref 40–150)
ALT SERPL W/O P-5'-P-CCNC: 35 U/L (ref 10–44)
ANION GAP SERPL CALC-SCNC: 11 MMOL/L (ref 8–16)
AST SERPL-CCNC: 20 U/L (ref 10–40)
BILIRUB SERPL-MCNC: 0.5 MG/DL (ref 0.1–1)
BNP SERPL-MCNC: 1316 PG/ML (ref 0–99)
BUN SERPL-MCNC: 48 MG/DL (ref 8–23)
CALCIUM SERPL-MCNC: 8.7 MG/DL (ref 8.7–10.5)
CHLORIDE SERPL-SCNC: 109 MMOL/L (ref 95–110)
CO2 SERPL-SCNC: 20 MMOL/L (ref 23–29)
CREAT SERPL-MCNC: 2 MG/DL (ref 0.5–1.4)
EST. GFR  (NO RACE VARIABLE): 35 ML/MIN/1.73 M^2
GLUCOSE SERPL-MCNC: 90 MG/DL (ref 70–110)
POTASSIUM SERPL-SCNC: 3.8 MMOL/L (ref 3.5–5.1)
PROT SERPL-MCNC: 6.8 G/DL (ref 6–8.4)
SODIUM SERPL-SCNC: 140 MMOL/L (ref 136–145)

## 2024-12-06 PROCEDURE — 36415 COLL VENOUS BLD VENIPUNCTURE: CPT

## 2024-12-06 PROCEDURE — 83880 ASSAY OF NATRIURETIC PEPTIDE: CPT

## 2024-12-06 PROCEDURE — 80053 COMPREHEN METABOLIC PANEL: CPT

## 2024-12-09 ENCOUNTER — DOCUMENTATION ONLY (OUTPATIENT)
Facility: CLINIC | Age: 74
End: 2024-12-09
Payer: MEDICARE

## 2024-12-09 ENCOUNTER — HOSPITAL ENCOUNTER (OUTPATIENT)
Dept: CARDIOLOGY | Facility: HOSPITAL | Age: 74
Discharge: HOME | End: 2024-12-09
Attending: INTERNAL MEDICINE
Payer: MEDICARE

## 2024-12-09 DIAGNOSIS — I48.0 PAROXYSMAL ATRIAL FIBRILLATION WITH RVR: ICD-10-CM

## 2024-12-09 DIAGNOSIS — I48.3 TYPICAL ATRIAL FLUTTER: ICD-10-CM

## 2024-12-09 PROCEDURE — 93005 ELECTROCARDIOGRAM TRACING: CPT

## 2024-12-09 PROCEDURE — 93010 ELECTROCARDIOGRAM REPORT: CPT | Mod: ,,, | Performed by: INTERNAL MEDICINE

## 2024-12-09 NOTE — PROGRESS NOTES
"[facility]) DISCHARGE VISIT - PHONE     Called and spoke Mr. Doe    Most Recent Hospital Discharge Date: 10/26/2024  Last admission Diagnosis/chief complaint:SOB    Pt discharge completed by phone related to patient's preference      Pt reports the following:  []  Shortness of Breath with activity  []  Shortness of Breath at rest   []  Fatigue  []  Edema   [] Chest pain or tightness  [] Weight Increase since discharge  [x] None of the above    Medications:   Medication reconciliation completed today per RN.  Pt reports having all medications available and understands how to take them appropriately. Reminded pt to call prior to making any changes to medications.     Education:   [x] Confirmed pt still has  "[facility] Home Care Guide" .   Reviewed key points as listed below.     Recommend 2 gram sodium restriction and 1500cc fluid restriction.  Encourage physical activity with graded exercise program.  Requested patient to weigh themselves daily, and to notify us if their weight increases by more than 3 lbs in 1 day or 5 lbs in 3 days.     [x] Reviewed completed "Daily Weight and Symptom Tracker".  Reviewed with patient when and how to call [facility] according to "Yellow Zone" and "Red Zone".       Watch for these Signs and Symptoms: If any of these occur, contact [facility] immediately:   Increase in shortness of breath with movement   Increase in swelling in your legs and ankles   Weight gain of more than 3 pounds in a night or 5 pounds in 3 days.   Difficulty breathing when you are lying down   Worsening fatigue or tiredness   Stomach bloating, a full feeling or a loss of appetite   Increased coughing--especially when you are lying down    MyChart and Care Companion:   Patient active on myChart? Yes, patient uses regularly.    HF TCC Program Plan:  Pt has successfully completed HFT[facility] program.  Pt care to be transferred to Dr. Agrawal for long term care. "     Pt educated on how to call their offices and how to call [facility] On call in the event of an after hour issue.    PT reminded to continue to follow recommendations made during the [facility] program to include monitoring daily weights, taking medications according to list, following up to appointments per provider recommendations, stop smoking/ start exercising and following a heart friendly low salt, low fluid diet.      Pt was able to verbalize back to RN in their own words correct diet/fluid restrictions, necessity for exercise, warning signs and symptoms, when and how to contact their  Long term care team .      Plan: Patient completed program and will follow Dr. Agrawal         [x]  Discussed upcoming appointments and/or plan for follow-up care with his/her PCP/Cardiology     Electronic hand off completed : To  by routing epic note per  Hanane Ireland, FNP-BC   Please refer to provider note for additional details and assessment.

## 2024-12-13 LAB
OHS QRS DURATION: 104 MS
OHS QTC CALCULATION: 489 MS

## 2024-12-18 ENCOUNTER — OFFICE VISIT (OUTPATIENT)
Dept: NEPHROLOGY | Facility: CLINIC | Age: 74
End: 2024-12-18
Payer: MEDICARE

## 2024-12-18 VITALS
OXYGEN SATURATION: 95 % | BODY MASS INDEX: 27.33 KG/M2 | SYSTOLIC BLOOD PRESSURE: 110 MMHG | WEIGHT: 201.75 LBS | DIASTOLIC BLOOD PRESSURE: 78 MMHG | HEART RATE: 95 BPM | HEIGHT: 72 IN

## 2024-12-18 DIAGNOSIS — N18.32 STAGE 3B CHRONIC KIDNEY DISEASE: ICD-10-CM

## 2024-12-18 DIAGNOSIS — I10 ESSENTIAL HYPERTENSION: ICD-10-CM

## 2024-12-18 DIAGNOSIS — N17.9 AKI (ACUTE KIDNEY INJURY): Primary | ICD-10-CM

## 2024-12-18 PROCEDURE — 4010F ACE/ARB THERAPY RXD/TAKEN: CPT | Mod: CPTII,S$GLB,, | Performed by: INTERNAL MEDICINE

## 2024-12-18 PROCEDURE — 3074F SYST BP LT 130 MM HG: CPT | Mod: CPTII,S$GLB,, | Performed by: INTERNAL MEDICINE

## 2024-12-18 PROCEDURE — 3078F DIAST BP <80 MM HG: CPT | Mod: CPTII,S$GLB,, | Performed by: INTERNAL MEDICINE

## 2024-12-18 PROCEDURE — 1159F MED LIST DOCD IN RCRD: CPT | Mod: CPTII,S$GLB,, | Performed by: INTERNAL MEDICINE

## 2024-12-18 PROCEDURE — 3066F NEPHROPATHY DOC TX: CPT | Mod: CPTII,S$GLB,, | Performed by: INTERNAL MEDICINE

## 2024-12-18 PROCEDURE — G2211 COMPLEX E/M VISIT ADD ON: HCPCS | Mod: S$GLB,,, | Performed by: INTERNAL MEDICINE

## 2024-12-18 PROCEDURE — 3008F BODY MASS INDEX DOCD: CPT | Mod: CPTII,S$GLB,, | Performed by: INTERNAL MEDICINE

## 2024-12-18 PROCEDURE — 3288F FALL RISK ASSESSMENT DOCD: CPT | Mod: CPTII,S$GLB,, | Performed by: INTERNAL MEDICINE

## 2024-12-18 PROCEDURE — 1101F PT FALLS ASSESS-DOCD LE1/YR: CPT | Mod: CPTII,S$GLB,, | Performed by: INTERNAL MEDICINE

## 2024-12-18 PROCEDURE — 1126F AMNT PAIN NOTED NONE PRSNT: CPT | Mod: CPTII,S$GLB,, | Performed by: INTERNAL MEDICINE

## 2024-12-18 PROCEDURE — 99999 PR PBB SHADOW E&M-EST. PATIENT-LVL IV: CPT | Mod: PBBFAC,,, | Performed by: INTERNAL MEDICINE

## 2024-12-18 PROCEDURE — 99205 OFFICE O/P NEW HI 60 MIN: CPT | Mod: S$GLB,,, | Performed by: INTERNAL MEDICINE

## 2024-12-18 NOTE — PROGRESS NOTES
CHIEF COMPLAINT/HPI: Nader DANG is a 74 y.o.man with A.fib, s/p DCCV conversion, on Eliquis, flecainide and Metprolol. Chronic Hepatitis B, hx of bariatric sugery, CKD, Tobacco dependant, in October 2024, was admitted for URI, was in A.fib and Dr Agrawal consulted from ED and brought pt to lab for MARILYNN and successful DCCV., while hospitalized BP remains a bit high even w/ addition of metoprolol, his Cardura was reduced to 4 mg and losartan to 50 mg (50% doses) while here, so will resume his prior doses for d/c. Also added  Lasix 40 mg QD and potassium and magnesium supplementation and DC.  His repeat labs were with MARTA.     Reviewing his vital he had some hemodynamic instability with  to 124 in one hr.    Past Medical History:   Diagnosis Date    Arthritis of big toe     RIGHT FOOT BIG TOE    Chronic hepatitis B     Chronic rhinosinusitis     Gout     Hypertension     Vitamin D deficiency        Nader DANG reports that he has been smoking cigars and cigarettes. He started smoking about a year ago. He has a 1.9 pack-year smoking history. He has quit using smokeless tobacco. He reports current alcohol use of about 2.0 standard drinks of alcohol per week. He reports that he does not use drugs.    No family history on file.    ROS:  Review of Systems   Constitutional:  Negative for activity change, appetite change, chills, fever and unexpected weight change.   HENT:  Negative for congestion, ear discharge, hearing loss, nosebleeds, sore throat and tinnitus.    Eyes:  Negative for photophobia, pain, redness and visual disturbance.   Respiratory:  Negative for cough, chest tightness, shortness of breath and wheezing.    Cardiovascular:  Negative for chest pain, palpitations and leg swelling.   Gastrointestinal:  Negative for abdominal distention, constipation, diarrhea, nausea and vomiting.   Endocrine: Negative for cold intolerance, heat intolerance, polydipsia and polyuria.   Genitourinary:  Negative for decreased urine  volume, difficulty urinating, dysuria, flank pain and hematuria.   Musculoskeletal:  Negative for arthralgias, gait problem, joint swelling and neck stiffness.   Skin:  Negative for rash.   Neurological:  Negative for dizziness, tremors, weakness, numbness and headaches.   Psychiatric/Behavioral:  Negative for confusion and sleep disturbance.          PE:    Vitals:    12/18/24 1058   BP: 110/78   Pulse: 95   SpO2: 95%   Weight: 91.5 kg (201 lb 11.5 oz)   Height: 6' (1.829 m)     Physical Exam  Constitutional:       General: He is not in acute distress.     Appearance: Normal appearance. He is normal weight.   HENT:      Head: Normocephalic and atraumatic.      Nose: Nose normal.      Mouth/Throat:      Mouth: Mucous membranes are moist.      Pharynx: Oropharynx is clear. No oropharyngeal exudate or posterior oropharyngeal erythema.   Eyes:      Extraocular Movements: Extraocular movements intact.      Conjunctiva/sclera: Conjunctivae normal.      Pupils: Pupils are equal, round, and reactive to light.   Cardiovascular:      Rate and Rhythm: Normal rate and regular rhythm.      Pulses: Normal pulses.      Heart sounds: Normal heart sounds. No murmur heard.     No friction rub. No gallop.   Pulmonary:      Effort: Pulmonary effort is normal. No respiratory distress.      Breath sounds: Normal breath sounds. No stridor. No wheezing or rales.   Abdominal:      General: Abdomen is flat. Bowel sounds are normal.      Palpations: Abdomen is soft.      Tenderness: There is no abdominal tenderness. There is no guarding or rebound.   Musculoskeletal:         General: No swelling. Normal range of motion.      Cervical back: Normal range of motion and neck supple.      Right lower leg: No edema.      Left lower leg: No edema.   Skin:     General: Skin is warm.      Coloration: Skin is not jaundiced or pale.      Findings: No lesion.   Neurological:      General: No focal deficit present.      Mental Status: He is alert and  "oriented to person, place, and time.      Motor: No weakness.   Psychiatric:         Mood and Affect: Mood normal.           Impression/Plan:    1. MARTA (acute kidney injury)    2. Essential hypertension    3. Stage 3b chronic kidney disease      #sever MARTA on  CKD iii : due to hemodynamic instability, and diuretic use.   -counseled on avoiding taking Diuretics daily by  check daily wt, check LE and respiratory status.  -counseled on avoiding NSAID's   -counseled on avoiding Hypotension/HTN.     Lab Results   Component Value Date    CREATININE 2.0 (H) 12/06/2024     Protein Creatinine Ratios:    No results found for: "UTPCR"    Acid-Base:   Lab Results   Component Value Date     12/06/2024    K 3.8 12/06/2024    CO2 20 (L) 12/06/2024     #HTN: Blood pressures acceptable cont same medications, but counseled on the use of diuretics. After reviewing his Echo with normal EF, and IVC pressure. Counseled to take it PRN and counseled on how and when.   Counseled on low Salt intake but hydrate.      # CKD MBD: labs ordered.  Lab Results   Component Value Date    CALCIUM 8.7 12/06/2024    PHOS 4.6 (H) 12/04/2024       # Anemia:   Lab Results   Component Value Date    HGB 10.7 (L) 12/04/2024    Iron panel     # DM:  Last HbA1C No results found for: "HGBA1C"    # Lipid management:   Lab Results   Component Value Date    LDLCALC 66.4 10/25/2024     70 minutes of total time spent on the encounter, which includes face to face time and non-face to face time preparing to see the patient (eg, review of tests), Obtaining and/or reviewing separately obtained history, Documenting clinical information in the electronic or other health record, Independently interpreting results (not separately reported) and communicating results to the patient/family/caregiver, or Care coordination (not separately reported).    Visit today included increased complexity associated with the care of the episodic problem MARTA, CKD, HTN, anemia  addressed " and managing the longitudinal care of the patient due to the serious and/or complex managed problem(s) .    # ESRD planing: none     Follow up in 3 m with labs in 4 weeks then before appt

## 2024-12-19 ENCOUNTER — HOSPITAL ENCOUNTER (OUTPATIENT)
Dept: RADIOLOGY | Facility: HOSPITAL | Age: 74
Discharge: HOME | End: 2024-12-19
Attending: INTERNAL MEDICINE
Payer: MEDICARE

## 2024-12-19 ENCOUNTER — HOSPITAL ENCOUNTER (OUTPATIENT)
Dept: CARDIOLOGY | Facility: HOSPITAL | Age: 74
Discharge: HOME | End: 2024-12-19
Attending: INTERNAL MEDICINE
Payer: MEDICARE

## 2024-12-19 ENCOUNTER — TELEPHONE (OUTPATIENT)
Dept: CARDIOLOGY | Facility: CLINIC | Age: 74
End: 2024-12-19

## 2024-12-19 DIAGNOSIS — I25.10 CORONARY ARTERY CALCIFICATION SEEN ON CT SCAN: ICD-10-CM

## 2024-12-19 DIAGNOSIS — Z79.899 ENCOUNTER FOR MONITORING FLECAINIDE THERAPY: ICD-10-CM

## 2024-12-19 DIAGNOSIS — N17.9 AKI (ACUTE KIDNEY INJURY): ICD-10-CM

## 2024-12-19 DIAGNOSIS — Z51.81 ENCOUNTER FOR MONITORING FLECAINIDE THERAPY: ICD-10-CM

## 2024-12-19 DIAGNOSIS — I48.0 PAROXYSMAL ATRIAL FIBRILLATION: ICD-10-CM

## 2024-12-19 DIAGNOSIS — I48.0 PAROXYSMAL ATRIAL FIBRILLATION WITH RVR: ICD-10-CM

## 2024-12-19 LAB
CV STRESS BASE HR: 91 BPM
DIASTOLIC BLOOD PRESSURE: 101 MMHG
NUC STRESS DIASTOLIC VOLUME INDEX: 136
NUC STRESS EJECTION FRACTION: 43 %
NUC STRESS SYSTOLIC VOLUME INDEX: 77
OHS CV CPX 85 PERCENT MAX PREDICTED HEART RATE MALE: 126
OHS CV CPX MAX PREDICTED HEART RATE: 148
OHS CV CPX PATIENT IS FEMALE: 0
OHS CV CPX PATIENT IS MALE: 1
OHS CV CPX PEAK DIASTOLIC BLOOD PRESSURE: 93 MMHG
OHS CV CPX PEAK HEAR RATE: 96 BPM
OHS CV CPX PEAK RATE PRESSURE PRODUCT: NORMAL
OHS CV CPX PEAK SYSTOLIC BLOOD PRESSURE: 134 MMHG
OHS CV CPX PERCENT MAX PREDICTED HEART RATE ACHIEVED: 65
OHS CV CPX RATE PRESSURE PRODUCT PRESENTING: NORMAL
OHS CV INITIAL DOSE: 10.7 MCG/KG/MIN
OHS CV PEAK DOSE: 30.2 MCG/KG/MIN
SYSTOLIC BLOOD PRESSURE: 142 MMHG

## 2024-12-19 PROCEDURE — 93017 CV STRESS TEST TRACING ONLY: CPT

## 2024-12-19 PROCEDURE — 71046 X-RAY EXAM CHEST 2 VIEWS: CPT | Mod: 26,,, | Performed by: RADIOLOGY

## 2024-12-19 PROCEDURE — 93018 CV STRESS TEST I&R ONLY: CPT | Mod: ,,, | Performed by: INTERNAL MEDICINE

## 2024-12-19 PROCEDURE — 93016 CV STRESS TEST SUPVJ ONLY: CPT | Mod: ,,, | Performed by: INTERNAL MEDICINE

## 2024-12-19 PROCEDURE — 63600175 PHARM REV CODE 636 W HCPCS: Performed by: INTERNAL MEDICINE

## 2024-12-19 PROCEDURE — 71046 X-RAY EXAM CHEST 2 VIEWS: CPT | Mod: TC,FY

## 2024-12-19 PROCEDURE — A9502 TC99M TETROFOSMIN: HCPCS | Performed by: INTERNAL MEDICINE

## 2024-12-19 PROCEDURE — 78452 HT MUSCLE IMAGE SPECT MULT: CPT | Mod: 26,,, | Performed by: INTERNAL MEDICINE

## 2024-12-19 PROCEDURE — 78452 HT MUSCLE IMAGE SPECT MULT: CPT

## 2024-12-19 RX ORDER — REGADENOSON 0.08 MG/ML
0.4 INJECTION, SOLUTION INTRAVENOUS ONCE
Status: COMPLETED | OUTPATIENT
Start: 2024-12-19 | End: 2024-12-19

## 2024-12-19 RX ADMIN — TETROFOSMIN 10.7 MILLICURIE: 1.38 INJECTION, POWDER, LYOPHILIZED, FOR SOLUTION INTRAVENOUS at 07:12

## 2024-12-19 RX ADMIN — REGADENOSON 0.4 MG: 0.08 INJECTION, SOLUTION INTRAVENOUS at 08:12

## 2024-12-19 RX ADMIN — TETROFOSMIN 30.2 MILLICURIE: 1.38 INJECTION, POWDER, LYOPHILIZED, FOR SOLUTION INTRAVENOUS at 09:12

## 2024-12-19 NOTE — TELEPHONE ENCOUNTER
----- Message from Teo Agrawal MD sent at 12/19/2024  2:08 PM CST -----  Pls call pt to let him know his stress test was abnormal and to move up OV to within next several weeks.

## 2024-12-20 PROBLEM — N17.9 AKI (ACUTE KIDNEY INJURY): Status: ACTIVE | Noted: 2024-12-20

## 2024-12-21 ENCOUNTER — PATIENT MESSAGE (OUTPATIENT)
Dept: NEPHROLOGY | Facility: CLINIC | Age: 74
End: 2024-12-21
Payer: MEDICARE

## 2024-12-26 ENCOUNTER — TELEPHONE (OUTPATIENT)
Dept: CARDIOLOGY | Facility: CLINIC | Age: 74
End: 2024-12-26
Payer: MEDICARE

## 2024-12-26 NOTE — TELEPHONE ENCOUNTER
----- Message from Shayy sent at 12/26/2024 10:54 AM CST -----  .Type:  Patient Returning Call    Who Called: Self     Who Left Message for Patient: Tawanna Delvalle MA    Does the patient know what this is regarding?: Yes     Would the patient rather a call back or a response via [facility]? Call Back     Best Call Back Number: .[phone] (home)       Additional Information:

## 2024-12-26 NOTE — TELEPHONE ENCOUNTER
----- Message from Neelima sent at 12/26/2024  9:34 AM CST -----  Regarding: patient call back  Type: Patient Call Back    Who called: Self     What is the request in detail: asked for a call back to see when he can change his apt. He was told that the doctor wanted to move it up     Can the clinic reply by [facility]? No     Would the patient rather a call back or a response via [facility]? Call     Best call back number: .[phone]      Additional Information: Asked to move it up sooner

## 2025-01-06 RX ORDER — POTASSIUM CHLORIDE 20 MEQ/1
20 TABLET, EXTENDED RELEASE ORAL DAILY
Qty: 90 TABLET | Refills: 1 | Status: SHIPPED | OUTPATIENT
Start: 2025-01-06 | End: 2025-01-08

## 2025-01-08 ENCOUNTER — OFFICE VISIT (OUTPATIENT)
Dept: CARDIOLOGY | Facility: CLINIC | Age: 75
End: 2025-01-08
Payer: MEDICARE

## 2025-01-08 VITALS
HEART RATE: 83 BPM | OXYGEN SATURATION: 91 % | HEIGHT: 72 IN | SYSTOLIC BLOOD PRESSURE: 126 MMHG | BODY MASS INDEX: 28.66 KG/M2 | WEIGHT: 211.63 LBS | DIASTOLIC BLOOD PRESSURE: 82 MMHG | RESPIRATION RATE: 20 BRPM

## 2025-01-08 DIAGNOSIS — I50.33 ACUTE ON CHRONIC HEART FAILURE WITH PRESERVED EJECTION FRACTION: Primary | ICD-10-CM

## 2025-01-08 DIAGNOSIS — I50.32 CHRONIC HEART FAILURE WITH PRESERVED EJECTION FRACTION: ICD-10-CM

## 2025-01-08 DIAGNOSIS — Z51.81 ENCOUNTER FOR MONITORING FLECAINIDE THERAPY: ICD-10-CM

## 2025-01-08 DIAGNOSIS — I25.10 CORONARY ARTERY CALCIFICATION SEEN ON CT SCAN: ICD-10-CM

## 2025-01-08 DIAGNOSIS — R94.39 ABNORMAL NUCLEAR STRESS TEST: ICD-10-CM

## 2025-01-08 DIAGNOSIS — N18.32 STAGE 3B CHRONIC KIDNEY DISEASE: ICD-10-CM

## 2025-01-08 DIAGNOSIS — I48.3 TYPICAL ATRIAL FLUTTER: ICD-10-CM

## 2025-01-08 DIAGNOSIS — I48.0 PAF (PAROXYSMAL ATRIAL FIBRILLATION): ICD-10-CM

## 2025-01-08 DIAGNOSIS — R06.02 SOB (SHORTNESS OF BREATH): ICD-10-CM

## 2025-01-08 DIAGNOSIS — Z79.01 CHRONIC ANTICOAGULATION: ICD-10-CM

## 2025-01-08 DIAGNOSIS — Z79.899 ENCOUNTER FOR MONITORING FLECAINIDE THERAPY: ICD-10-CM

## 2025-01-08 DIAGNOSIS — I35.0 NONRHEUMATIC AORTIC VALVE STENOSIS: ICD-10-CM

## 2025-01-08 DIAGNOSIS — I10 ESSENTIAL HYPERTENSION: ICD-10-CM

## 2025-01-08 PROCEDURE — G2211 COMPLEX E/M VISIT ADD ON: HCPCS | Mod: S$GLB,,, | Performed by: INTERNAL MEDICINE

## 2025-01-08 PROCEDURE — 1159F MED LIST DOCD IN RCRD: CPT | Mod: CPTII,S$GLB,, | Performed by: INTERNAL MEDICINE

## 2025-01-08 PROCEDURE — 1126F AMNT PAIN NOTED NONE PRSNT: CPT | Mod: CPTII,S$GLB,, | Performed by: INTERNAL MEDICINE

## 2025-01-08 PROCEDURE — 99999 PR PBB SHADOW E&M-EST. PATIENT-LVL V: CPT | Mod: PBBFAC,,, | Performed by: INTERNAL MEDICINE

## 2025-01-08 PROCEDURE — 1101F PT FALLS ASSESS-DOCD LE1/YR: CPT | Mod: CPTII,S$GLB,, | Performed by: INTERNAL MEDICINE

## 2025-01-08 PROCEDURE — 3288F FALL RISK ASSESSMENT DOCD: CPT | Mod: CPTII,S$GLB,, | Performed by: INTERNAL MEDICINE

## 2025-01-08 PROCEDURE — 3079F DIAST BP 80-89 MM HG: CPT | Mod: CPTII,S$GLB,, | Performed by: INTERNAL MEDICINE

## 2025-01-08 PROCEDURE — 99215 OFFICE O/P EST HI 40 MIN: CPT | Mod: S$GLB,,, | Performed by: INTERNAL MEDICINE

## 2025-01-08 PROCEDURE — 3008F BODY MASS INDEX DOCD: CPT | Mod: CPTII,S$GLB,, | Performed by: INTERNAL MEDICINE

## 2025-01-08 PROCEDURE — 1160F RVW MEDS BY RX/DR IN RCRD: CPT | Mod: CPTII,S$GLB,, | Performed by: INTERNAL MEDICINE

## 2025-01-08 PROCEDURE — 3074F SYST BP LT 130 MM HG: CPT | Mod: CPTII,S$GLB,, | Performed by: INTERNAL MEDICINE

## 2025-01-08 NOTE — LETTER
January 8, 2025        Hang Membreno MD  [address] [place], [place] [address]  [address]  [place] [address]             [facility]  [address] [facility] [address]  Phone: [phone]   Patient: Nader Clarke   MR Number: [id number]   YOB: 1950   Date of Visit: 1/8/2025       Dear Dr. Membreno:    Thank you for referring Nader Clarke to me for evaluation. Attached you will find relevant portions of my assessment and plan of care.    If you have questions, please do not hesitate to call me. I look forward to following Nader Clarke along with you.    Sincerely,      Teo Agrawal MD            CC  No Recipients    Enclosure

## 2025-01-08 NOTE — PROGRESS NOTES
CARDIOVASCULAR PROGRESS NOTE    REASON FOR CONSULT:   Nader Clarke is a 74 y.o. male who presents for follow up of PAF/FL, AS.    PCP: Haseeb BIGGS: Chan  HISTORY OF PRESENT ILLNESS:   The patient comes in today for follow up.  He is complaining of generalized fatigue, malaise, dyspnea, and orthopnea.  He denies any angina, palpitations, or syncope.  There has been no PND, melena, hematuria, or claudication symptoms.  He remains on apixaban and flecainide.  He does describe some mild left lower extremity edema.  His nuclear stress test revealed mild inferior ischemia with mildly reduced LV function.  Atrial fibrillation ablation is planned in February.  I have discussed the case with Dr. Giles.  We will continue his flecainide for the time being.  Dr. Giles will attempt to move up his ablation to mid January.    CARDIOVASCULAR HISTORY:   PAF/FL on apixaban 5mg bid and flecainide s/p MARILYNN/DCCV 10/24/24    AS, mild-mod (echo 10/2024)    PAST MEDICAL HISTORY:     Past Medical History:   Diagnosis Date    Arthritis of big toe     RIGHT FOOT BIG TOE    Chronic hepatitis B     Chronic rhinosinusitis     Gout     Hypertension     Vitamin D deficiency        PAST SURGICAL HISTORY:     Past Surgical History:   Procedure Laterality Date    BALLOON SINUPLASTY OF PARANASAL SINUS      CARDIOVERSION N/A 10/24/2024    Procedure: Cardioversion;  Surgeon: Teo Agrawal MD;  Location: [facility] CATH LAB;  Service: Cardiology;  Laterality: N/A;    CARPUL TUNNEL      RIGHT HAND    ECHOCARDIOGRAM,TRANSESOPHAGEAL N/A 12/2/2024    Procedure: Transesophageal echo (MARILYNN) intra-procedure log documentation;  Surgeon: Teo Ware MD;  Location: [facility] EP LAB;  Service: Cardiology;  Laterality: N/A;    NERVE REPAIR      RIGHT ARM    RIGHT KNEE REPLACEMENT 7/21/2015      TOTAL KNEE ARTHROPLASTY Left 2020    TRANSESOPHAGEAL ECHOCARDIOGRAM WITH POSSIBLE CARDIOVERSION (MARILYNN W/ POSS CARDIOVERSION) N/A 10/24/2024    Procedure:  Transesophageal echo (MARILYNN) intra-procedure log documentation;  Surgeon: Teo Agrawal MD;  Location: [facility] CATH LAB;  Service: Cardiology;  Laterality: N/A;    TREATMENT OF CARDIAC ARRHYTHMIA N/A 12/2/2024    Procedure: Cardioversion or Defibrillation;  Surgeon: Jeff Lee MD;  Location: [facility] EP LAB;  Service: Cardiology;  Laterality: N/A;  AF, MARILYNN, DCCV, MAC, MB, 3 Prep       ALLERGIES AND MEDICATION:     Review of patient's allergies indicates:   Allergen Reactions    Augmentin [amoxicillin-pot clavulanate] Hives and Rash        Medication List            Accurate as of January 8, 2025 11:45 AM. If you have any questions, ask your nurse or doctor.                CONTINUE taking these medications      allopurinoL 100 MG tablet  Commonly known as: ZYLOPRIM     apixaban 5 mg Tab  Commonly known as: ELIQUIS  Take 1 tablet (5 mg total) by mouth 2 (two) times daily.     ascorbic Acid 500 mg Cpsr  Commonly known as: VITAMIN C     cetirizine 5 MG tablet  Commonly known as: ZYRTEC  Take 1 tablet (5 mg total) by mouth once daily. To help w/ allergy/sinus     doxazosin 8 MG Tab  Commonly known as: CARDURA     entecavir 1 MG Tab  Commonly known as: BARACLUDE     ergocalciferol 50,000 unit Cap  Commonly known as: ERGOCALCIFEROL  Take 1 capsule (50,000 Units total) by mouth every 7 days.     ferrous sulfate 142 mg (45 mg iron) Tbsr  Commonly known as: SLOW RELEASE IRON     flecainide 100 MG Tab  Commonly known as: TAMBOCOR  Take 1 tablet (100 mg total) by mouth 2 (two) times daily.     fluticasone propionate 50 mcg/actuation nasal spray  Commonly known as: FLONASE  2 sprays (100 mcg total) by Each Nostril route every evening. To help w/ allergy/sinus     furosemide 40 MG tablet  Commonly known as: LASIX  Take 1 tablet (40 mg total) by mouth once daily.     guaiFENesin 600 mg 12 hr tablet  Commonly known as: MUCINEX  Take 1 tablet (600 mg total) by mouth 2 (two) times daily. To help clear sinus and chest congestion      hydrALAZINE 50 MG tablet  Commonly known as: APRESOLINE     metoprolol tartrate 100 MG tablet  Commonly known as: LOPRESSOR  Take 1 tablet (100 mg total) by mouth 2 (two) times a day. For blood pressure and heart rate control     multivitamin with minerals tablet     oxyCODONE-acetaminophen 5-325 mg per tablet  Commonly known as: PERCOCET     pantoprazole 40 MG tablet  Commonly known as: PROTONIX     potassium chloride SA 20 MEQ tablet  Commonly known as: K-DUR,KLOR-CON  Take 1 tablet (20 mEq total) by mouth once daily. To help offset potassium loss associated w/ furosemide use     Saline NasaL 0.65 % nasal spray  Generic drug: sodium chloride  1 spray by Nasal route as needed for Congestion. To help reduce need for Afrin     VITAMIN B-12 100 MCG tablet  Generic drug: cyanocobalamin              SOCIAL HISTORY:     Social History     Socioeconomic History    Marital status:    Tobacco Use    Smoking status: Every Day     Current packs/day: 2.00     Average packs/day: 2.0 packs/day for 1 year (2.0 ttl pk-yrs)     Types: Cigars, Cigarettes     Start date: 2024     Last attempt to quit: 7/6/2016    Smokeless tobacco: Former   Substance and Sexual Activity    Alcohol use: Yes     Alcohol/week: 2.0 standard drinks of alcohol     Types: 2 Cans of beer per week    Drug use: No    Sexual activity: Yes     Partners: Female     Social Drivers of Health     Financial Resource Strain: Medium Risk (11/11/2024)    Overall Financial Resource Strain (CARDIA)     Difficulty of Paying Living Expenses: Somewhat hard   Food Insecurity: No Food Insecurity (11/11/2024)    Hunger Vital Sign     Worried About Running Out of Food in the Last Year: Never true     Ran Out of Food in the Last Year: Never true   Transportation Needs: No Transportation Needs (10/26/2024)    TRANSPORTATION NEEDS     Transportation : No   Physical Activity: Sufficiently Active (11/11/2024)    Exercise Vital Sign     Days of Exercise per Week: 7 days      Minutes of Exercise per Session: 30 min   Stress: No Stress Concern Present (11/11/2024)    [employer] [place] [employer] - Occupational Stress Questionnaire     Feeling of Stress : Only a little   Housing Stability: Low Risk  (11/11/2024)    Housing Stability Vital Sign     Unable to Pay for Housing in the Last Year: No     Homeless in the Last Year: No       FAMILY HISTORY:   No family history on file.    REVIEW OF SYSTEMS:   Review of Systems   Constitutional:  Positive for malaise/fatigue. Negative for chills, diaphoresis and fever.   HENT:  Negative for nosebleeds.    Eyes:  Negative for blurred vision, double vision and photophobia.   Respiratory:  Positive for shortness of breath. Negative for hemoptysis and wheezing.    Cardiovascular:  Positive for orthopnea and leg swelling. Negative for chest pain, palpitations, claudication and PND.   Gastrointestinal:  Negative for abdominal pain, blood in stool, heartburn, melena, nausea and vomiting.   Genitourinary:  Negative for flank pain and hematuria.   Musculoskeletal:  Negative for falls, myalgias and neck pain.   Skin:  Negative for rash.   Neurological:  Negative for dizziness, seizures, loss of consciousness, weakness and headaches.   Endo/Heme/Allergies:  Negative for polydipsia. Does not bruise/bleed easily.   Psychiatric/Behavioral:  Negative for depression and memory loss. The patient is not nervous/anxious.        PHYSICAL EXAM:     Vitals:    01/08/25 1138   BP: 126/82   Pulse: 83   Resp: 20    Body mass index is 28.7 kg/m².  Weight: 96 kg (211 lb 10.3 oz)   Height: 6' (182.9 cm)     Physical Exam  Vitals reviewed.   Constitutional:       General: He is not in acute distress.     Appearance: Normal appearance. He is well-developed. He is not ill-appearing, toxic-appearing or diaphoretic.   HENT:      Head: Normocephalic and atraumatic.   Eyes:      General: No scleral icterus.     Extraocular Movements: Extraocular movements intact.       Conjunctiva/sclera: Conjunctivae normal.      Pupils: Pupils are equal, round, and reactive to light.   Neck:      Thyroid: No thyromegaly.      Vascular: Normal carotid pulses. No JVD.      Trachea: Trachea normal.   Cardiovascular:      Rate and Rhythm: Normal rate and regular rhythm. Occasional Extrasystoles are present.     Heart sounds: S1 normal and S2 normal. Heart sounds are distant. No murmur heard.     No friction rub. No gallop.   Pulmonary:      Effort: Pulmonary effort is normal. No respiratory distress.      Breath sounds: No stridor. Rales present. No wheezing or rhonchi.   Chest:      Chest wall: No tenderness.   Abdominal:      General: There is no distension.      Palpations: Abdomen is soft.   Musculoskeletal:         General: No swelling or tenderness. Normal range of motion.      Cervical back: Normal range of motion and neck supple. No edema or rigidity.      Right lower leg: No edema.      Left lower leg: No edema.   Feet:      Right foot:      Skin integrity: No ulcer.      Left foot:      Skin integrity: No ulcer.   Skin:     General: Skin is warm and dry.      Coloration: Skin is not jaundiced.   Neurological:      General: No focal deficit present.      Mental Status: He is alert and oriented to person, place, and time.      Cranial Nerves: No cranial nerve deficit.   Psychiatric:         Mood and Affect: Mood normal.         Speech: Speech normal.         Behavior: Behavior normal. Behavior is cooperative.         DATA:   EKG: (personally reviewed tracing(s))  1/8/25 AFL 80, , QTc 507, NSSTTW changes    Laboratory:  CBC:  Recent Labs   Lab 11/13/24  1326 11/27/24  1228 12/04/24  1306   WBC 10.59 11.51 12.50   Hemoglobin 13.2 L 12.0 L 10.7 L   Hematocrit 38.2 L 35.4 L 32.9 L   Platelets 206 220 264       CHEMISTRIES:  Recent Labs   Lab 10/31/24  1315 11/06/24  1213 11/13/24  1326 11/27/24  1228 12/04/24  1306 12/06/24  1215   Glucose 132 H 96 157 H 131 H 153 H 90   Sodium 140 140  135 L 137 136 140   Potassium 4.4 4.1 3.8 3.9 3.8 3.8   BUN 32 H 44 H 51 H 39 H 56 H 48 H   Creatinine 2.2 H 2.3 H 2.3 H 2.0 H 3.0 H 2.0 H   eGFR 31 A 29 A 29 A 35 A 21 A 35 A   Calcium 8.6 L 8.8 8.6 L 8.7 8.7 8.7   Magnesium 2.3 2.7 H 2.2 2.0 2.1  --        CARDIAC BIOMARKERS:  Recent Labs   Lab 10/24/24  0929 10/25/24  0537   Troponin I 0.027 H 0.036 H       COAGS:  Recent Labs   Lab 11/27/24  1228   INR 1.1       LIPIDS/LFTS:  Recent Labs   Lab 10/25/24  0452 10/31/24  1315 11/27/24  1228 12/04/24  1306 12/06/24  1215   Cholesterol 127  --   --   --   --    Triglycerides 98  --   --   --   --    HDL 41  --   --   --   --    LDL Cholesterol 66.4  --   --   --   --    Non-HDL Cholesterol 86  --   --   --   --    AST  --    < > 21 27 20   ALT  --    < > 17 39 35    < > = values in this interval not displayed.     Lab Results   Component Value Date    TSH 1.035 10/24/2024         Cardiovascular Testing:  L MPI 12/19/24 (images personally reviewed and interpreted)    Abnormal myocardial perfusion scan.    There is a mild to moderate intensity, small to medium sized, reversible perfusion abnormality that is consistent with ischemia in the mid to apical inferior wall(s) in the typical distribution of the RCA territory.    There are no other significant perfusion abnormalities.    The gated perfusion images showed an ejection fraction of 43% post stress.    The ECG portion of the study is negative for ischemia.    The patient reported no chest pain during the stress test.    There were no arrhythmias during stress.    MARILYNN/DCCV 10/24/24  Normal biventricular size/fxn, LVEF 55%.  Normal wall motion.  Mod Ca++ AS, SASHA 1.4cm2 by planimetry.  Mod AI.  MAC with mod MR  Mild TR  Biatrial enlargement  No LA/SURAJ thrombus  Successful DCCV AF->NSR 78 BPM 200J x1.  Plan:  Stop dilt gtt  Start eliquis 5mg bid  Cont BBL, titrate as HR/BP allows  Home in am  Follow up with Dr. Agrawal     Echo: 6/5/24    Mild left ventricular  hypertrophy.     Normal left ventricular systolic function.     Left ventricular ejection fraction is estimated at  60-65%.     Severely increased left atrial size.     Severe mitral annular calcification.     Mild mitral valve regurgitation.     Moderate aortic valve calcification.     Velocities compatible with severe aortic stenosis.     Aortic valve mean gradient is 29.7 mmHg.     SASHA by planimetry=1.69cm2.     Mild tricuspid valve regurgitation.      Carotid US 6/5/24  1.   There is mild plaque on the right as detailed above. There is no sonographic evidence of a hemodynamically significant stenosis (less than 50%).   2.   There is antegrade flow in both vertebral arteries.     ASSESSMENT:   # PAF/FL, HR controlled, on apixaban 5mg bid and flecainide (managed by Dr. Giles). Back in AFL with controlled VR.  Ablation planned in Feb. Pt with fatigue and CHF sxs.  Dr. Giles will try to move up to mid Jan 2025.  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  MPI 12/2024 mildly abnl.  Cont med rx for now.  # HFpEF, ?mild overload  # HTN, controlled  # MARTA, Creat 1.5->2.3->2.0    PLAN:   Cont med rx  Cont eliquis 5mg bid  Cont lasix 40mg qd, will inc to bid for 3 days.  Check BMP 1 week  RTC 3 months (Apr 2025) with echo.    If persistent sxs despite restoration of SR will consider cath  Consider addition of statin +/- neph referral.    Complex OV.    The above documents medical care services that are part of ongoing care related to this patient's serious/complex condition (Code ). (?CAD/HTN/AS/PAF)        Teo Agrawal MD, FACC

## 2025-01-14 ENCOUNTER — PATIENT MESSAGE (OUTPATIENT)
Dept: ELECTROPHYSIOLOGY | Facility: CLINIC | Age: 75
End: 2025-01-14
Payer: MEDICARE

## 2025-01-14 ENCOUNTER — TELEPHONE (OUTPATIENT)
Dept: ELECTROPHYSIOLOGY | Facility: CLINIC | Age: 75
End: 2025-01-14
Payer: MEDICARE

## 2025-01-14 NOTE — TELEPHONE ENCOUNTER
----- Message from Bela sent at 1/14/2025  1:55 PM CST -----  Patient is returning your missed phone call. He can be reached at [phone].      Thank you

## 2025-01-14 NOTE — TELEPHONE ENCOUNTER
----- Message from Teo Giles MD sent at 1/13/2025  3:49 PM CST -----  This patient presented last week with heart failure. If we get open slots can we move him up? thanks

## 2025-01-15 ENCOUNTER — LAB VISIT (OUTPATIENT)
Dept: LAB | Facility: HOSPITAL | Age: 75
End: 2025-01-15
Attending: INTERNAL MEDICINE
Payer: MEDICARE

## 2025-01-15 DIAGNOSIS — N17.9 AKI (ACUTE KIDNEY INJURY): ICD-10-CM

## 2025-01-15 DIAGNOSIS — I48.0 PAROXYSMAL ATRIAL FIBRILLATION WITH RVR: ICD-10-CM

## 2025-01-15 DIAGNOSIS — I48.3 TYPICAL ATRIAL FLUTTER: ICD-10-CM

## 2025-01-15 DIAGNOSIS — I10 ESSENTIAL HYPERTENSION: ICD-10-CM

## 2025-01-15 DIAGNOSIS — N18.32 STAGE 3B CHRONIC KIDNEY DISEASE: ICD-10-CM

## 2025-01-15 LAB
25(OH)D3+25(OH)D2 SERPL-MCNC: 29 NG/ML (ref 30–96)
ALBUMIN SERPL BCP-MCNC: 3.6 G/DL (ref 3.5–5.2)
ALP SERPL-CCNC: 94 U/L (ref 40–150)
ALT SERPL W/O P-5'-P-CCNC: 21 U/L (ref 10–44)
ANION GAP SERPL CALC-SCNC: 10 MMOL/L (ref 8–16)
ANION GAP SERPL CALC-SCNC: 10 MMOL/L (ref 8–16)
APTT PPP: 30.5 SEC (ref 21–32)
AST SERPL-CCNC: 19 U/L (ref 10–40)
BASOPHILS # BLD AUTO: 0.1 K/UL (ref 0–0.2)
BASOPHILS NFR BLD: 1.1 % (ref 0–1.9)
BILIRUB SERPL-MCNC: 0.6 MG/DL (ref 0.1–1)
BUN SERPL-MCNC: 37 MG/DL (ref 8–23)
BUN SERPL-MCNC: 37 MG/DL (ref 8–23)
CALCIUM SERPL-MCNC: 8.6 MG/DL (ref 8.7–10.5)
CALCIUM SERPL-MCNC: 8.6 MG/DL (ref 8.7–10.5)
CHLORIDE SERPL-SCNC: 105 MMOL/L (ref 95–110)
CHLORIDE SERPL-SCNC: 105 MMOL/L (ref 95–110)
CO2 SERPL-SCNC: 23 MMOL/L (ref 23–29)
CO2 SERPL-SCNC: 23 MMOL/L (ref 23–29)
CREAT SERPL-MCNC: 2.1 MG/DL (ref 0.5–1.4)
CREAT SERPL-MCNC: 2.1 MG/DL (ref 0.5–1.4)
DIFFERENTIAL METHOD BLD: ABNORMAL
EOSINOPHIL # BLD AUTO: 0.2 K/UL (ref 0–0.5)
EOSINOPHIL NFR BLD: 1.9 % (ref 0–8)
ERYTHROCYTE [DISTWIDTH] IN BLOOD BY AUTOMATED COUNT: 16 % (ref 11.5–14.5)
EST. GFR  (NO RACE VARIABLE): 33 ML/MIN/1.73 M^2
EST. GFR  (NO RACE VARIABLE): 33 ML/MIN/1.73 M^2
GLUCOSE SERPL-MCNC: 79 MG/DL (ref 70–110)
GLUCOSE SERPL-MCNC: 79 MG/DL (ref 70–110)
HCT VFR BLD AUTO: 36.1 % (ref 40–54)
HGB BLD-MCNC: 12.2 G/DL (ref 14–18)
IMM GRANULOCYTES # BLD AUTO: 0.05 K/UL (ref 0–0.04)
IMM GRANULOCYTES NFR BLD AUTO: 0.5 % (ref 0–0.5)
INR PPP: 1.1 (ref 0.8–1.2)
LYMPHOCYTES # BLD AUTO: 1.4 K/UL (ref 1–4.8)
LYMPHOCYTES NFR BLD: 15.1 % (ref 18–48)
MCH RBC QN AUTO: 34.1 PG (ref 27–31)
MCHC RBC AUTO-ENTMCNC: 33.8 G/DL (ref 32–36)
MCV RBC AUTO: 101 FL (ref 82–98)
MONOCYTES # BLD AUTO: 0.8 K/UL (ref 0.3–1)
MONOCYTES NFR BLD: 8.8 % (ref 4–15)
NEUTROPHILS # BLD AUTO: 6.9 K/UL (ref 1.8–7.7)
NEUTROPHILS NFR BLD: 72.6 % (ref 38–73)
NRBC BLD-RTO: 0 /100 WBC
PHOSPHATE SERPL-MCNC: 4.4 MG/DL (ref 2.7–4.5)
PLATELET # BLD AUTO: 168 K/UL (ref 150–450)
PMV BLD AUTO: 10.8 FL (ref 9.2–12.9)
POTASSIUM SERPL-SCNC: 3.6 MMOL/L (ref 3.5–5.1)
POTASSIUM SERPL-SCNC: 3.6 MMOL/L (ref 3.5–5.1)
PROT SERPL-MCNC: 6.8 G/DL (ref 6–8.4)
PROTHROMBIN TIME: 12.6 SEC (ref 9–12.5)
PTH-INTACT SERPL-MCNC: 257.8 PG/ML (ref 9–77)
RBC # BLD AUTO: 3.58 M/UL (ref 4.6–6.2)
SODIUM SERPL-SCNC: 138 MMOL/L (ref 136–145)
SODIUM SERPL-SCNC: 138 MMOL/L (ref 136–145)
WBC # BLD AUTO: 9.5 K/UL (ref 3.9–12.7)

## 2025-01-15 PROCEDURE — 36415 COLL VENOUS BLD VENIPUNCTURE: CPT | Performed by: INTERNAL MEDICINE

## 2025-01-15 PROCEDURE — 84100 ASSAY OF PHOSPHORUS: CPT | Performed by: INTERNAL MEDICINE

## 2025-01-15 PROCEDURE — 82306 VITAMIN D 25 HYDROXY: CPT | Performed by: INTERNAL MEDICINE

## 2025-01-15 PROCEDURE — 83970 ASSAY OF PARATHORMONE: CPT | Performed by: INTERNAL MEDICINE

## 2025-01-15 PROCEDURE — 85025 COMPLETE CBC W/AUTO DIFF WBC: CPT | Performed by: INTERNAL MEDICINE

## 2025-01-15 PROCEDURE — 85610 PROTHROMBIN TIME: CPT | Performed by: INTERNAL MEDICINE

## 2025-01-15 PROCEDURE — 85730 THROMBOPLASTIN TIME PARTIAL: CPT | Performed by: INTERNAL MEDICINE

## 2025-01-15 PROCEDURE — 80053 COMPREHEN METABOLIC PANEL: CPT | Performed by: INTERNAL MEDICINE

## 2025-01-17 ENCOUNTER — PATIENT MESSAGE (OUTPATIENT)
Dept: ELECTROPHYSIOLOGY | Facility: CLINIC | Age: 75
End: 2025-01-17
Payer: MEDICARE

## 2025-01-17 ENCOUNTER — TELEPHONE (OUTPATIENT)
Dept: ELECTROPHYSIOLOGY | Facility: CLINIC | Age: 75
End: 2025-01-17
Payer: MEDICARE

## 2025-01-22 ENCOUNTER — PATIENT MESSAGE (OUTPATIENT)
Dept: ELECTROPHYSIOLOGY | Facility: CLINIC | Age: 75
End: 2025-01-22
Payer: MEDICARE

## 2025-01-22 ENCOUNTER — TELEPHONE (OUTPATIENT)
Dept: ELECTROPHYSIOLOGY | Facility: CLINIC | Age: 75
End: 2025-01-22
Payer: MEDICARE

## 2025-01-22 NOTE — TELEPHONE ENCOUNTER
----- Message from ALEXY Brooks sent at 1/21/2025  9:55 AM CST -----  Emely, spoke to pt to cancel procedure. He is asking if 2/3 is still an option? I told him you will be back in touch at the end of the week.     Cecilia

## 2025-01-24 ENCOUNTER — TELEPHONE (OUTPATIENT)
Dept: ELECTROPHYSIOLOGY | Facility: CLINIC | Age: 75
End: 2025-01-24
Payer: MEDICARE

## 2025-01-24 PROBLEM — Z90.3 S/P SUBTOTAL GASTRECTOMY: Status: ACTIVE | Noted: 2024-08-09

## 2025-01-24 PROBLEM — J44.9 CHRONIC OBSTRUCTIVE PULMONARY DISEASE: Status: ACTIVE | Noted: 2022-04-22

## 2025-01-24 PROBLEM — Z72.0 TOBACCO ABUSE: Status: ACTIVE | Noted: 2022-04-22

## 2025-01-24 PROBLEM — I35.0 AORTIC VALVE STENOSIS: Status: ACTIVE | Noted: 2024-08-09

## 2025-01-24 PROBLEM — C22.0 HEPATOCELLULAR CARCINOMA: Status: ACTIVE | Noted: 2024-08-09

## 2025-01-24 NOTE — TELEPHONE ENCOUNTER
Spoke to patient's daughter (Jefferson)     CONFIRMED procedure arrival time of 5:00 AM on 1/25/2025    Reiterated instructions including:  -Directions to check in desk  -NPO after midnight night prior to procedure  -High importance of HOLDING Flecainide 4 days prior to procedure. Patient daughter reported last dose 1/22/2025. Dr. Giles aware and okay to proceed with procedure.   -Confirmed compliance of eliquis. Instructed daughter (Jefferson) patient to take morning and evening dose of Eliquis on 1/24/2025 and to hold the morning dose on 1/25/2025. Patient's daughter (Jefferson) verbalized understanding.   -Pre-procedure LABS Reviewed. No alerts noted.   -Confirmed absence  of implanted device/stimulator   -Confirmed no fever, cough, or shortness of breath in the past 30 days  -Confirmed no redness, rash, irritation, or yeast infection to groin area.   -Reviewed current visitor policy    Patient verbalized understanding of above and appreciated the call.

## 2025-01-24 NOTE — CONSULTS
[facility], [place]  MARILYNN Consult      Nader Clarke  YOB: 1950  Medical Record Number:  [id number]  Attending Physician:  Teo Giles MD   Current Principal Problem:  Paroxysmal atrial fibrillation    History     Cc: Atrial Fibrillation, Atrial Flutter    HPI  Nader Clarke is a 73 yo male with PMHx of HTN, gout, former smoker, atrial flutter, and paroxysmal atrial fibrillation who presents for ablation with Dr. Giles. PVI/CTI.    MARILYNN/CV 10/24/24:  Normal biventricular size/fxn, LVEF 55%.  Normal wall motion.  Mod Ca++ AS, SASHA 1.4cm2 by planimetry.  Mod AI.  MAC with mod MR  Mild TR  Biatrial enlargement  No LA/SURAJ thrombus    [id number] 2  Anticoagulant/antiplatelets: eliquis, compliant  ECG: typical flutter    Dysphagia or odynophagia:  No  Liver Disease, esophageal disease, or known varices:  No  Upper GI Bleeding: No  Snoring:  No  Sleep Apnea:  No  Prior neck surgery or radiation:  No  History of anesthetic difficulties:  No  Family history of anesthetic difficulties:  No  Last oral intake: yesterday before midnight  Able to move neck in all directions:  Yes    Medications - Outpatient  Prior to Admission medications    Medication Sig Start Date End Date Taking? Authorizing Provider   allopurinol (ZYLOPRIM) 100 MG tablet Take 100 mg by mouth Daily.    Provider, Historical   apixaban (ELIQUIS) 5 mg Tab Take 1 tablet (5 mg total) by mouth 2 (two) times daily. 12/25/24   Teo Agrawal MD   ascorbic Acid (VITAMIN C) 500 mg CpSR Take 500 mg by mouth once daily.    Provider, Historical   cetirizine (ZYRTEC) 5 MG tablet Take 1 tablet (5 mg total) by mouth once daily. To help w/ allergy/sinus 10/26/24   Tad Rebolledo MD   cyanocobalamin (VITAMIN B-12) 100 MCG tablet Take 100 mcg by mouth once daily.    Provider, Historical   doxazosin (CARDURA) 8 MG Tab Take 8 mg by mouth every evening. 1/12/17   Provider, Historical   entecavir (BARACLUDE) 1 MG Tab Take 1 mg by  mouth once daily. 10/1/24   Provider, Historical   ergocalciferol (ERGOCALCIFEROL) 50,000 unit Cap Take 1 capsule (50,000 Units total) by mouth every 7 days. 12/14/17   Shashank Tran MD   ferrous sulfate (SLOW RELEASE IRON) 142 mg (45 mg iron) TbSR Take 1 tablet by mouth once daily.    Provider, Historical   flecainide (TAMBOCOR) 100 MG Tab Take 1 tablet (100 mg total) by mouth 2 (two) times daily. 11/12/24 11/7/25  Teo Giles MD   fluticasone propionate (FLONASE) 50 mcg/actuation nasal spray 2 sprays (100 mcg total) by Each Nostril route every evening. To help w/ allergy/sinus 10/26/24   Tad Rebolledo MD   furosemide (LASIX) 40 MG tablet Take 1 tablet (40 mg total) by mouth once daily. 11/20/24   Teo Agrawal MD   guaiFENesin (MUCINEX) 600 mg 12 hr tablet Take 1 tablet (600 mg total) by mouth 2 (two) times daily. To help clear sinus and chest congestion 10/26/24   Tad Rebolledo MD   hydrALAZINE (APRESOLINE) 50 MG tablet TAKE 1 TABLET BY MOUTH THREE TIMES A DAY HOLD IF BLOOD PRESSURE IS LESS THAN 130 SYSTOLIC. 3/8/17   Provider, Historical   metoprolol tartrate (LOPRESSOR) 100 MG tablet Take 1 tablet (100 mg total) by mouth 2 (two) times a day. For blood pressure and heart rate control 11/20/24 11/20/25  Teo Agrawal MD   multivitamin with minerals tablet Take 1 tablet by mouth once daily.    Provider, Historical   oxyCODONE-acetaminophen (PERCOCET) 5-325 mg per tablet Take 1 tablet by mouth every 4 (four) hours as needed. 7/26/24   Provider, Historical   pantoprazole (PROTONIX) 40 MG tablet Take 1 tablet by mouth once daily. 7/8/24   Provider, Historical   sodium chloride (SALINE NASAL) 0.65 % nasal spray 1 spray by Nasal route as needed for Congestion. To help reduce need for Afrin 10/26/24   Tad Rebolledo MD     Allergies  Review of patient's allergies indicates:   Allergen Reactions    Augmentin [amoxicillin-pot clavulanate] Hives and Rash     Past Medical  History  Past Medical History:   Diagnosis Date    Arthritis of big toe     RIGHT FOOT BIG TOE    Chronic hepatitis B     Chronic rhinosinusitis     Gout     Hypertension     Vitamin D deficiency      Past Surgical History  Past Surgical History:   Procedure Laterality Date    BALLOON SINUPLASTY OF PARANASAL SINUS      CARDIOVERSION N/A 10/24/2024    Procedure: Cardioversion;  Surgeon: Teo Agrawal MD;  Location: [facility] CATH LAB;  Service: Cardiology;  Laterality: N/A;    CARPUL TUNNEL      RIGHT HAND    ECHOCARDIOGRAM,TRANSESOPHAGEAL N/A 12/02/2024    Procedure: Transesophageal echo (MARILYNN) intra-procedure log documentation;  Surgeon: Teo Ware MD;  Location: [facility] EP LAB;  Service: Cardiology;  Laterality: N/A;    NERVE REPAIR      RIGHT ARM    perforated bowel  2013    RIGHT KNEE REPLACEMENT 7/21/2015      TOTAL KNEE ARTHROPLASTY Left 2020    TRANSESOPHAGEAL ECHOCARDIOGRAM WITH POSSIBLE CARDIOVERSION (MARILYNN W/ POSS CARDIOVERSION) N/A 10/24/2024    Procedure: Transesophageal echo (MARILYNN) intra-procedure log documentation;  Surgeon: Teo Agrawal MD;  Location: [facility] CATH LAB;  Service: Cardiology;  Laterality: N/A;    TREATMENT OF CARDIAC ARRHYTHMIA N/A 12/02/2024    Procedure: Cardioversion or Defibrillation;  Surgeon: Jeff Lee MD;  Location: [facility] EP LAB;  Service: Cardiology;  Laterality: N/A;  AF, MARILYNN, DCCV, MAC, MB, 3 Prep     ROS  10 point ROS performed and negative except as stated in HPI     Physical Examination   Vital Signs  Vitals  Temp: 98.4 °F (36.9 °C)  Temp Source: Temporal  Pulse: 107  Heart Rate Source: Monitor  Resp: 18  SpO2: 95 %  BP: (!) 138/90  MAP (mmHg): 110  BP Location: Left arm  BP Method: Automatic  Patient Position: Lying    24 Hour VS Range  Temp:  [98.4 °F (36.9 °C)]   Pulse:  [107]   Resp:  [18]   BP: (138-141)/(83-90)   SpO2:  [95 %]     Intake/Output Summary (Last 24 hours) at 1/25/2025 0669  Last data filed at 1/25/2025 0550  Gross per 24 hour   Intake --  "  Output 500 ml   Net -500 ml         Physical Exam:   Constitutional: no acute distress  HEENT: NCAT, EOMI, no scleral icterus  Cardiovascular: Regular rate and regularly irregular rhythm  Pulmonary: Normal respiratory effort   Abdomen: nontender, non-distended   Neuro: alert and oriented, no focal deficits  Extremities: warm, no edema   MSK: no deformities  Skin: intact, no rashes     Data   No results for input(s): "WBC", "HGB", "HCT", "PLT" in the last 168 hours.   No results for input(s): "PROTIME", "INR" in the last 168 hours.   No results for input(s): "NA", "K", "CL", "CO2", "BUN", "CREATININE", "ANIONGAP", "CALCIUM" in the last 168 hours.   Assessment & Plan     MARILYNN for SURAJ assessment prior to ablation  -No absolute contraindications of esophageal stricture, tumor, perforation, laceration,or diverticulum and/or active GI bleed  -The risks, benefits & alternatives of the procedure were explained to the patient.   -The risks of transesophageal echo include but are not limited to:  Dental trauma, esophageal trauma/perforation, bleeding, laryngospasm/brochospasm, aspiration, sore throat/hoarseness, & dislodgement of the endotracheal tube/nasogastric tube (where applicable).    -The risks of ANES monitored sedation include hypotension, respiratory depression, arrhythmias, bronchospasm, & death.    -Informed consent was obtained. The patient is agreeable to proceed with the procedure and all questions and concerns addressed.    Case discussed with an attending in echocardiography lab.    Munir Hartman MD  [facility]   Cardiovascular Disease PGY-VI  "

## 2025-01-25 ENCOUNTER — ANESTHESIA (OUTPATIENT)
Dept: MEDSURG UNIT | Facility: HOSPITAL | Age: 75
End: 2025-01-25
Payer: MEDICARE

## 2025-01-25 ENCOUNTER — ANESTHESIA EVENT (OUTPATIENT)
Dept: MEDSURG UNIT | Facility: HOSPITAL | Age: 75
End: 2025-01-25
Payer: MEDICARE

## 2025-01-25 ENCOUNTER — HOSPITAL ENCOUNTER (OUTPATIENT)
Dept: CARDIOLOGY | Facility: HOSPITAL | Age: 75
Discharge: HOME | End: 2025-01-25
Attending: INTERNAL MEDICINE | Admitting: INTERNAL MEDICINE
Payer: MEDICARE

## 2025-01-25 ENCOUNTER — HOSPITAL ENCOUNTER (OUTPATIENT)
Facility: HOSPITAL | Age: 75
Discharge: HOME | End: 2025-01-26
Attending: INTERNAL MEDICINE | Admitting: INTERNAL MEDICINE
Payer: MEDICARE

## 2025-01-25 DIAGNOSIS — I49.9 ARRHYTHMIA: ICD-10-CM

## 2025-01-25 DIAGNOSIS — I48.0 AF (PAROXYSMAL ATRIAL FIBRILLATION): Primary | ICD-10-CM

## 2025-01-25 DIAGNOSIS — I48.91 ATRIAL FIBRILLATION: ICD-10-CM

## 2025-01-25 DIAGNOSIS — I48.3 TYPICAL ATRIAL FLUTTER: ICD-10-CM

## 2025-01-25 DIAGNOSIS — I48.0 PAROXYSMAL ATRIAL FIBRILLATION WITH RVR: ICD-10-CM

## 2025-01-25 PROBLEM — R31.0 GROSS HEMATURIA: Status: ACTIVE | Noted: 2025-01-25

## 2025-01-25 LAB
ASCENDING AORTA: 4.4 CM
BSA FOR ECHO PROCEDURE: 2.18 M2
LAA PV: 20 CM/S
OHS QRS DURATION: 86 MS
OHS QTC CALCULATION: 502 MS
SINUS: 3.2 CM
STJ: 2.5 CM

## 2025-01-25 PROCEDURE — 37000009 HC ANESTHESIA EA ADD 15 MINS: Performed by: INTERNAL MEDICINE

## 2025-01-25 PROCEDURE — 63600175 PHARM REV CODE 636 W HCPCS: Performed by: INTERNAL MEDICINE

## 2025-01-25 PROCEDURE — C2630 CATH, EP, COOL-TIP: HCPCS | Performed by: INTERNAL MEDICINE

## 2025-01-25 PROCEDURE — 25000003 PHARM REV CODE 250: Performed by: INTERNAL MEDICINE

## 2025-01-25 PROCEDURE — 63600175 PHARM REV CODE 636 W HCPCS: Performed by: NURSE ANESTHETIST, CERTIFIED REGISTERED

## 2025-01-25 PROCEDURE — C1887 CATHETER, GUIDING: HCPCS | Performed by: INTERNAL MEDICINE

## 2025-01-25 PROCEDURE — 93320 DOPPLER ECHO COMPLETE: CPT | Mod: 26,,, | Performed by: INTERNAL MEDICINE

## 2025-01-25 PROCEDURE — 93005 ELECTROCARDIOGRAM TRACING: CPT

## 2025-01-25 PROCEDURE — 93656 COMPRE EP EVAL ABLTJ ATR FIB: CPT | Mod: ,,, | Performed by: INTERNAL MEDICINE

## 2025-01-25 PROCEDURE — 93312 ECHO TRANSESOPHAGEAL: CPT | Mod: 26,,, | Performed by: INTERNAL MEDICINE

## 2025-01-25 PROCEDURE — C1766 INTRO/SHEATH,STRBLE,NON-PEEL: HCPCS | Performed by: INTERNAL MEDICINE

## 2025-01-25 PROCEDURE — 51798 US URINE CAPACITY MEASURE: CPT

## 2025-01-25 PROCEDURE — 93010 ELECTROCARDIOGRAM REPORT: CPT | Mod: ,,, | Performed by: INTERNAL MEDICINE

## 2025-01-25 PROCEDURE — 93656 COMPRE EP EVAL ABLTJ ATR FIB: CPT | Performed by: INTERNAL MEDICINE

## 2025-01-25 PROCEDURE — 93320 DOPPLER ECHO COMPLETE: CPT

## 2025-01-25 PROCEDURE — 93655 ICAR CATH ABLTJ DSCRT ARRHYT: CPT | Performed by: INTERNAL MEDICINE

## 2025-01-25 PROCEDURE — 93655 ICAR CATH ABLTJ DSCRT ARRHYT: CPT | Mod: ,,, | Performed by: INTERNAL MEDICINE

## 2025-01-25 PROCEDURE — 37000008 HC ANESTHESIA 1ST 15 MINUTES: Performed by: INTERNAL MEDICINE

## 2025-01-25 PROCEDURE — C1894 INTRO/SHEATH, NON-LASER: HCPCS | Performed by: INTERNAL MEDICINE

## 2025-01-25 PROCEDURE — 93325 DOPPLER ECHO COLOR FLOW MAPG: CPT | Mod: 26,,, | Performed by: INTERNAL MEDICINE

## 2025-01-25 PROCEDURE — C1753 CATH, INTRAVAS ULTRASOUND: HCPCS | Performed by: INTERNAL MEDICINE

## 2025-01-25 PROCEDURE — C1730 CATH, EP, 19 OR FEW ELECT: HCPCS | Performed by: INTERNAL MEDICINE

## 2025-01-25 PROCEDURE — D9220A PRA ANESTHESIA: Mod: ANES,,, | Performed by: ANESTHESIOLOGY

## 2025-01-25 PROCEDURE — 36620 INSERTION CATHETER ARTERY: CPT | Mod: 59,,, | Performed by: ANESTHESIOLOGY

## 2025-01-25 PROCEDURE — 27201423 OPTIME MED/SURG SUP & DEVICES STERILE SUPPLY: Performed by: INTERNAL MEDICINE

## 2025-01-25 PROCEDURE — D9220A PRA ANESTHESIA: Mod: CRNA,,, | Performed by: NURSE ANESTHETIST, CERTIFIED REGISTERED

## 2025-01-25 PROCEDURE — 25000003 PHARM REV CODE 250: Performed by: NURSE ANESTHETIST, CERTIFIED REGISTERED

## 2025-01-25 RX ORDER — MIDAZOLAM HYDROCHLORIDE 1 MG/ML
INJECTION INTRAMUSCULAR; INTRAVENOUS
Status: DISCONTINUED | OUTPATIENT
Start: 2025-01-25 | End: 2025-01-25

## 2025-01-25 RX ORDER — DEXAMETHASONE SODIUM PHOSPHATE 4 MG/ML
INJECTION, SOLUTION INTRA-ARTICULAR; INTRALESIONAL; INTRAMUSCULAR; INTRAVENOUS; SOFT TISSUE
Status: DISCONTINUED | OUTPATIENT
Start: 2025-01-25 | End: 2025-01-25

## 2025-01-25 RX ORDER — ONDANSETRON HYDROCHLORIDE 2 MG/ML
INJECTION, SOLUTION INTRAVENOUS
Status: DISCONTINUED | OUTPATIENT
Start: 2025-01-25 | End: 2025-01-25

## 2025-01-25 RX ORDER — ROCURONIUM BROMIDE 10 MG/ML
INJECTION, SOLUTION INTRAVENOUS
Status: DISCONTINUED | OUTPATIENT
Start: 2025-01-25 | End: 2025-01-25

## 2025-01-25 RX ORDER — ACETAMINOPHEN 325 MG/1
650 TABLET ORAL EVERY 4 HOURS PRN
Status: DISCONTINUED | OUTPATIENT
Start: 2025-01-25 | End: 2025-01-26 | Stop reason: HOSPADM

## 2025-01-25 RX ORDER — FENTANYL CITRATE 50 UG/ML
25 INJECTION, SOLUTION INTRAMUSCULAR; INTRAVENOUS EVERY 5 MIN PRN
Status: DISCONTINUED | OUTPATIENT
Start: 2025-01-25 | End: 2025-01-26 | Stop reason: HOSPADM

## 2025-01-25 RX ORDER — PROCHLORPERAZINE EDISYLATE 5 MG/ML
5 INJECTION INTRAMUSCULAR; INTRAVENOUS EVERY 30 MIN PRN
Status: DISCONTINUED | OUTPATIENT
Start: 2025-01-25 | End: 2025-01-26 | Stop reason: HOSPADM

## 2025-01-25 RX ORDER — VASOPRESSIN 20 [USP'U]/ML
INJECTION, SOLUTION INTRAMUSCULAR; SUBCUTANEOUS
Status: DISCONTINUED | OUTPATIENT
Start: 2025-01-25 | End: 2025-01-25

## 2025-01-25 RX ORDER — LIDOCAINE HYDROCHLORIDE 20 MG/ML
INJECTION, SOLUTION EPIDURAL; INFILTRATION; INTRACAUDAL; PERINEURAL
Status: DISCONTINUED | OUTPATIENT
Start: 2025-01-25 | End: 2025-01-25

## 2025-01-25 RX ORDER — FENTANYL CITRATE 50 UG/ML
INJECTION, SOLUTION INTRAMUSCULAR; INTRAVENOUS
Status: DISCONTINUED | OUTPATIENT
Start: 2025-01-25 | End: 2025-01-25

## 2025-01-25 RX ORDER — EPHEDRINE SULFATE 50 MG/ML
INJECTION, SOLUTION INTRAVENOUS
Status: DISCONTINUED | OUTPATIENT
Start: 2025-01-25 | End: 2025-01-25

## 2025-01-25 RX ORDER — ENTECAVIR 0.5 MG/1
1 TABLET, FILM COATED ORAL DAILY
Status: DISCONTINUED | OUTPATIENT
Start: 2025-01-25 | End: 2025-01-26 | Stop reason: HOSPADM

## 2025-01-25 RX ORDER — METOPROLOL SUCCINATE 50 MG/1
50 TABLET, EXTENDED RELEASE ORAL DAILY
Status: DISCONTINUED | OUTPATIENT
Start: 2025-01-26 | End: 2025-01-26 | Stop reason: HOSPADM

## 2025-01-25 RX ORDER — HEPARIN SODIUM 10000 [USP'U]/100ML
INJECTION, SOLUTION INTRAVENOUS CONTINUOUS PRN
Status: DISCONTINUED | OUTPATIENT
Start: 2025-01-25 | End: 2025-01-25

## 2025-01-25 RX ORDER — PHENYLEPHRINE HYDROCHLORIDE 10 MG/ML
INJECTION INTRAVENOUS
Status: DISCONTINUED | OUTPATIENT
Start: 2025-01-25 | End: 2025-01-25

## 2025-01-25 RX ORDER — HEPARIN SOD,PORCINE/0.9 % NACL 1000/500ML
INTRAVENOUS SOLUTION INTRAVENOUS
Status: DISCONTINUED | OUTPATIENT
Start: 2025-01-25 | End: 2025-01-26 | Stop reason: HOSPADM

## 2025-01-25 RX ORDER — PROPOFOL 10 MG/ML
VIAL (ML) INTRAVENOUS
Status: DISCONTINUED | OUTPATIENT
Start: 2025-01-25 | End: 2025-01-25

## 2025-01-25 RX ORDER — ONDANSETRON HYDROCHLORIDE 2 MG/ML
4 INJECTION, SOLUTION INTRAVENOUS ONCE AS NEEDED
Status: DISCONTINUED | OUTPATIENT
Start: 2025-01-25 | End: 2025-01-26 | Stop reason: HOSPADM

## 2025-01-25 RX ORDER — OXYCODONE AND ACETAMINOPHEN 5; 325 MG/1; MG/1
1 TABLET ORAL EVERY 4 HOURS PRN
Status: DISCONTINUED | OUTPATIENT
Start: 2025-01-25 | End: 2025-01-26 | Stop reason: HOSPADM

## 2025-01-25 RX ORDER — HEPARIN SODIUM 1000 [USP'U]/ML
INJECTION, SOLUTION INTRAVENOUS; SUBCUTANEOUS
Status: DISCONTINUED | OUTPATIENT
Start: 2025-01-25 | End: 2025-01-25

## 2025-01-25 RX ORDER — FLECAINIDE ACETATE 100 MG/1
100 TABLET ORAL EVERY 12 HOURS
Status: DISCONTINUED | OUTPATIENT
Start: 2025-01-26 | End: 2025-01-26 | Stop reason: HOSPADM

## 2025-01-25 RX ORDER — FLECAINIDE ACETATE 50 MG/1
100 TABLET ORAL 2 TIMES DAILY
Status: DISCONTINUED | OUTPATIENT
Start: 2025-01-25 | End: 2025-01-25

## 2025-01-25 RX ORDER — DIPHENHYDRAMINE HYDROCHLORIDE 50 MG/ML
25 INJECTION INTRAMUSCULAR; INTRAVENOUS EVERY 6 HOURS PRN
Status: DISCONTINUED | OUTPATIENT
Start: 2025-01-25 | End: 2025-01-26 | Stop reason: HOSPADM

## 2025-01-25 RX ORDER — PANTOPRAZOLE SODIUM 40 MG/1
40 TABLET, DELAYED RELEASE ORAL DAILY
Status: DISCONTINUED | OUTPATIENT
Start: 2025-01-26 | End: 2025-01-26 | Stop reason: HOSPADM

## 2025-01-25 RX ORDER — PROTAMINE SULFATE 10 MG/ML
INJECTION, SOLUTION INTRAVENOUS
Status: DISCONTINUED | OUTPATIENT
Start: 2025-01-25 | End: 2025-01-25

## 2025-01-25 RX ORDER — FUROSEMIDE 40 MG/1
40 TABLET ORAL DAILY
Status: DISCONTINUED | OUTPATIENT
Start: 2025-01-26 | End: 2025-01-26 | Stop reason: HOSPADM

## 2025-01-25 RX ORDER — LIDOCAINE HYDROCHLORIDE 20 MG/ML
INJECTION, SOLUTION INFILTRATION; PERINEURAL
Status: DISCONTINUED | OUTPATIENT
Start: 2025-01-25 | End: 2025-01-26 | Stop reason: HOSPADM

## 2025-01-25 RX ORDER — HYDROMORPHONE HYDROCHLORIDE 1 MG/ML
0.2 INJECTION, SOLUTION INTRAMUSCULAR; INTRAVENOUS; SUBCUTANEOUS EVERY 5 MIN PRN
Status: DISCONTINUED | OUTPATIENT
Start: 2025-01-25 | End: 2025-01-26 | Stop reason: HOSPADM

## 2025-01-25 RX ADMIN — ONDANSETRON 4 MG: 2 INJECTION INTRAMUSCULAR; INTRAVENOUS at 10:01

## 2025-01-25 RX ADMIN — PROTAMINE SULFATE 10 MG: 10 INJECTION, SOLUTION INTRAVENOUS at 10:01

## 2025-01-25 RX ADMIN — PROTAMINE SULFATE 10 MG: 10 INJECTION, SOLUTION INTRAVENOUS at 11:01

## 2025-01-25 RX ADMIN — EPHEDRINE SULFATE 5 MG: 50 INJECTION INTRAVENOUS at 09:01

## 2025-01-25 RX ADMIN — ROCURONIUM BROMIDE 50 MG: 10 INJECTION INTRAVENOUS at 07:01

## 2025-01-25 RX ADMIN — LIDOCAINE HYDROCHLORIDE 40 MG: 20 INJECTION, SOLUTION EPIDURAL; INFILTRATION; INTRACAUDAL; PERINEURAL at 07:01

## 2025-01-25 RX ADMIN — ACETAMINOPHEN 650 MG: 325 TABLET ORAL at 12:01

## 2025-01-25 RX ADMIN — SUGAMMADEX 200 MG: 100 INJECTION, SOLUTION INTRAVENOUS at 11:01

## 2025-01-25 RX ADMIN — FENTANYL CITRATE 50 MCG: 50 INJECTION, SOLUTION INTRAMUSCULAR; INTRAVENOUS at 07:01

## 2025-01-25 RX ADMIN — FENTANYL CITRATE 50 MCG: 50 INJECTION, SOLUTION INTRAMUSCULAR; INTRAVENOUS at 11:01

## 2025-01-25 RX ADMIN — HEPARIN SODIUM 3000 UNITS: 1000 INJECTION, SOLUTION INTRAVENOUS; SUBCUTANEOUS at 10:01

## 2025-01-25 RX ADMIN — VASOPRESSIN 1 UNITS: 20 INJECTION INTRAVENOUS at 10:01

## 2025-01-25 RX ADMIN — PHENYLEPHRINE HYDROCHLORIDE 100 MCG: 10 INJECTION INTRAVENOUS at 08:01

## 2025-01-25 RX ADMIN — HEPARIN SODIUM 5700 UNITS: 1000 INJECTION, SOLUTION INTRAVENOUS; SUBCUTANEOUS at 09:01

## 2025-01-25 RX ADMIN — PROTAMINE SULFATE 5 MG: 10 INJECTION, SOLUTION INTRAVENOUS at 11:01

## 2025-01-25 RX ADMIN — OXYCODONE HYDROCHLORIDE AND ACETAMINOPHEN 1 TABLET: 5; 325 TABLET ORAL at 12:01

## 2025-01-25 RX ADMIN — VASOPRESSIN 2 UNITS: 20 INJECTION INTRAVENOUS at 10:01

## 2025-01-25 RX ADMIN — HEPARIN SODIUM AND DEXTROSE 12 UNITS/KG/HR: 10000; 5 INJECTION INTRAVENOUS at 08:01

## 2025-01-25 RX ADMIN — APIXABAN 5 MG: 5 TABLET, FILM COATED ORAL at 08:01

## 2025-01-25 RX ADMIN — MIDAZOLAM HYDROCHLORIDE 1 MG: 2 INJECTION, SOLUTION INTRAMUSCULAR; INTRAVENOUS at 11:01

## 2025-01-25 RX ADMIN — PHENYLEPHRINE HYDROCHLORIDE 0.5 MCG/KG/MIN: 10 INJECTION INTRAVENOUS at 09:01

## 2025-01-25 RX ADMIN — OXYCODONE HYDROCHLORIDE AND ACETAMINOPHEN 1 TABLET: 5; 325 TABLET ORAL at 05:01

## 2025-01-25 RX ADMIN — PROPOFOL 150 MG: 10 INJECTION, EMULSION INTRAVENOUS at 07:01

## 2025-01-25 RX ADMIN — EPHEDRINE SULFATE 15 MG: 50 INJECTION INTRAVENOUS at 10:01

## 2025-01-25 RX ADMIN — ROCURONIUM BROMIDE 10 MG: 10 INJECTION INTRAVENOUS at 10:01

## 2025-01-25 RX ADMIN — ENTECAVIR 1 MG: 0.5 TABLET ORAL at 11:01

## 2025-01-25 RX ADMIN — DEXAMETHASONE SODIUM PHOSPHATE 4 MG: 4 INJECTION, SOLUTION INTRAMUSCULAR; INTRAVENOUS at 08:01

## 2025-01-25 RX ADMIN — SODIUM CHLORIDE: 9 INJECTION, SOLUTION INTRAVENOUS at 07:01

## 2025-01-25 RX ADMIN — EPHEDRINE SULFATE 10 MG: 50 INJECTION INTRAVENOUS at 09:01

## 2025-01-25 RX ADMIN — ROCURONIUM BROMIDE 20 MG: 10 INJECTION INTRAVENOUS at 09:01

## 2025-01-25 RX ADMIN — EPHEDRINE SULFATE 10 MG: 50 INJECTION INTRAVENOUS at 10:01

## 2025-01-25 RX ADMIN — HEPARIN SODIUM 19000 UNITS: 1000 INJECTION, SOLUTION INTRAVENOUS; SUBCUTANEOUS at 08:01

## 2025-01-25 RX ADMIN — HEPARIN SODIUM 2850 UNITS: 1000 INJECTION, SOLUTION INTRAVENOUS; SUBCUTANEOUS at 09:01

## 2025-01-25 RX ADMIN — MIDAZOLAM HYDROCHLORIDE 1 MG: 2 INJECTION, SOLUTION INTRAMUSCULAR; INTRAVENOUS at 07:01

## 2025-01-25 RX ADMIN — ROCURONIUM BROMIDE 50 MG: 10 INJECTION INTRAVENOUS at 08:01

## 2025-01-25 RX ADMIN — HEPARIN SODIUM 3000 UNITS: 1000 INJECTION, SOLUTION INTRAVENOUS; SUBCUTANEOUS at 09:01

## 2025-01-25 NOTE — SUBJECTIVE & OBJECTIVE
Past Medical History:   Diagnosis Date    Arthritis of big toe     RIGHT FOOT BIG TOE    Chronic hepatitis B     Chronic rhinosinusitis     Gout     Hypertension     Vitamin D deficiency        Past Surgical History:   Procedure Laterality Date    BALLOON SINUPLASTY OF PARANASAL SINUS      CARDIOVERSION N/A 10/24/2024    Procedure: Cardioversion;  Surgeon: Teo Agrawal MD;  Location: [facility] CATH LAB;  Service: Cardiology;  Laterality: N/A;    CARPUL TUNNEL      RIGHT HAND    ECHOCARDIOGRAM,TRANSESOPHAGEAL N/A 12/02/2024    Procedure: Transesophageal echo (MARILYNN) intra-procedure log documentation;  Surgeon: Teo Ware MD;  Location: [facility] EP LAB;  Service: Cardiology;  Laterality: N/A;    NERVE REPAIR      RIGHT ARM    perforated bowel  2013    RIGHT KNEE REPLACEMENT 7/21/2015      TOTAL KNEE ARTHROPLASTY Left 2020    TRANSESOPHAGEAL ECHOCARDIOGRAM WITH POSSIBLE CARDIOVERSION (MARILYNN W/ POSS CARDIOVERSION) N/A 10/24/2024    Procedure: Transesophageal echo (MARILYNN) intra-procedure log documentation;  Surgeon: Teo Agrawal MD;  Location: [facility] CATH LAB;  Service: Cardiology;  Laterality: N/A;    TREATMENT OF CARDIAC ARRHYTHMIA N/A 12/02/2024    Procedure: Cardioversion or Defibrillation;  Surgeon: Jeff Lee MD;  Location: [facility] EP LAB;  Service: Cardiology;  Laterality: N/A;  AF, MARILYNN, DCCV, MAC, MB, 3 Prep       Review of patient's allergies indicates:   Allergen Reactions    Augmentin [amoxicillin-pot clavulanate] Hives and Rash       Family History    None         Tobacco Use    Smoking status: Every Day     Current packs/day: 2.00     Average packs/day: 2.0 packs/day for 1.1 years (2.1 ttl pk-yrs)     Types: Cigars, Cigarettes     Start date: 2024     Last attempt to quit: 7/6/2016    Smokeless tobacco: Current    Tobacco comments:     cigars   Substance and Sexual Activity    Alcohol use: Not Currently     Alcohol/week: 6.0 standard drinks of alcohol     Types: 6 Shots of liquor per week      "Comment: weekly    Drug use: No    Sexual activity: Yes     Partners: Female           Objective:     Temp:  [97.9 °F (36.6 °C)-98.4 °F (36.9 °C)] 97.9 °F (36.6 °C)  Pulse:  [] 63  Resp:  [16-22] 19  SpO2:  [94 %-100 %] 97 %  BP: (103-141)/(50-90) 120/68  Arterial Line BP: (108-131)/(50-64) 131/63  Weight: 95.3 kg (210 lb)  Body mass index is 29.29 kg/m².    Date 01/25/25 0700 - 01/26/25 0659   Shift 1646-7027 3102-5116 0227-3469 24 Hour Total   INTAKE   IV Piggyback 900   900   Shift Total(mL/kg) 900(9.4)   900(9.4)   OUTPUT   Urine(mL/kg/hr) 1800(2.4)   1800   Shift Total(mL/kg) 1800(18.9)   1800(18.9)   Weight (kg) 95.3 95.3 95.3 95.3          Drains       Drain  Duration                  Urethral Catheter 01/25/25 0807 Non-latex 16 Fr. <1 day                     Physical Exam  Constitutional:       General: He is not in acute distress.  HENT:      Head: Normocephalic and atraumatic.      Nose: Nose normal.   Eyes:      Conjunctiva/sclera: Conjunctivae normal.   Cardiovascular:      Rate and Rhythm: Normal rate.   Pulmonary:      Effort: Pulmonary effort is normal. No respiratory distress.   Genitourinary:     Comments: 16Fr cedillo catheter in place with thin red urine   Musculoskeletal:         General: No deformity.   Skin:     General: Skin is warm and dry.   Neurological:      General: No focal deficit present.      Mental Status: He is alert.   Psychiatric:         Mood and Affect: Mood normal.         Behavior: Behavior normal.          Significant Labs:    BMP:  No results for input(s): "NA", "K", "CL", "CO2", "BUN", "CREATININE", "LABGLOM", "GLUCOSE", "CALCIUM" in the last 168 hours.    CBC:  No results for input(s): "WBC", "HGB", "HCT", "PLT" in the last 168 hours.    All pertinent labs results from the past 24 hours have been reviewed.    Significant Imaging:  All pertinent imaging results/findings from the past 24 hours have been reviewed.                  "

## 2025-01-25 NOTE — PROGRESS NOTES
Nursing Transfer Note        Reason patient is being transferred: to assigned room post recovery    Transfer To: 350    Transfer via stretcher    Transfer with cardiac monitoring    Transported by PCT    Telemetry: Box Number 0569, Rate 64, and Rhythm NSR    Medicines sent: none    Any special needs or follow-up needed: bedrest complete. Bladder scan for post void residual with next void.    Patient belongings transferred with patient: Yes    Chart send with patient: Yes    Notified: daughter    Patient reassessed at: to be done by receiving RN (date, time)

## 2025-01-25 NOTE — CONSULTS
[facility] - Cardiology Stepdown  Urology  Consult Note    Patient Name: Nader Clarke  MRN: [id number]  Admission Date: 1/25/2025  Hospital Length of Stay: 0   Code Status: Prior   Attending Provider: Teo Giles MD   Consulting Provider: Roberto Trejo MD  Primary Care Physician: Hang Membreno MD  Principal Problem:Paroxysmal atrial fibrillation    Inpatient consult to Urology  Consult performed by: Roberto Trejo MD  Consult ordered by: Annabelle Fontaine MD          Subjective:     HPI:  74yoM consult for gross hematuria in cedillo. Had catheter placed today for ablation with EP, noted that after catheter placement some blood noted in tube. Patient received heparin during procedure. Post procedurally noted that he had worsened hematuria in catheter. Denies any prior urologic history. Does endorse LUTS, has weak FOS and intermittency at baseline. Does not take flomax. Has significant smoking history. No family history of  malignancy.     On exam is AFVSS, RCIB, cedillo with thin red urine in tube. No recent labs or imaging. Urology consulted for recommendations.     Past Medical History:   Diagnosis Date    Arthritis of big toe     RIGHT FOOT BIG TOE    Chronic hepatitis B     Chronic rhinosinusitis     Gout     Hypertension     Vitamin D deficiency        Past Surgical History:   Procedure Laterality Date    BALLOON SINUPLASTY OF PARANASAL SINUS      CARDIOVERSION N/A 10/24/2024    Procedure: Cardioversion;  Surgeon: Teo Agrawal MD;  Location: [facility] CATH LAB;  Service: Cardiology;  Laterality: N/A;    CARPUL TUNNEL      RIGHT HAND    ECHOCARDIOGRAM,TRANSESOPHAGEAL N/A 12/02/2024    Procedure: Transesophageal echo (MARILYNN) intra-procedure log documentation;  Surgeon: Teo Ware MD;  Location: [facility] EP LAB;  Service: Cardiology;  Laterality: N/A;    NERVE REPAIR      RIGHT ARM    perforated bowel  2013    RIGHT KNEE REPLACEMENT 7/21/2015      TOTAL KNEE ARTHROPLASTY Left 2020    TRANSESOPHAGEAL  ECHOCARDIOGRAM WITH POSSIBLE CARDIOVERSION (MARILYNN W/ POSS CARDIOVERSION) N/A 10/24/2024    Procedure: Transesophageal echo (MARILYNN) intra-procedure log documentation;  Surgeon: Teo Agrawal MD;  Location: [facility] CATH LAB;  Service: Cardiology;  Laterality: N/A;    TREATMENT OF CARDIAC ARRHYTHMIA N/A 12/02/2024    Procedure: Cardioversion or Defibrillation;  Surgeon: Jeff Lee MD;  Location: [facility] EP LAB;  Service: Cardiology;  Laterality: N/A;  AF, MARILYNN, DCCV, MAC, MB, 3 Prep       Review of patient's allergies indicates:   Allergen Reactions    Augmentin [amoxicillin-pot clavulanate] Hives and Rash       Family History    None         Tobacco Use    Smoking status: Every Day     Current packs/day: 2.00     Average packs/day: 2.0 packs/day for 1.1 years (2.1 ttl pk-yrs)     Types: Cigars, Cigarettes     Start date: 2024     Last attempt to quit: 7/6/2016    Smokeless tobacco: Current    Tobacco comments:     cigars   Substance and Sexual Activity    Alcohol use: Not Currently     Alcohol/week: 6.0 standard drinks of alcohol     Types: 6 Shots of liquor per week     Comment: weekly    Drug use: No    Sexual activity: Yes     Partners: Female           Objective:     Temp:  [97.9 °F (36.6 °C)-98.4 °F (36.9 °C)] 97.9 °F (36.6 °C)  Pulse:  [] 63  Resp:  [16-22] 19  SpO2:  [94 %-100 %] 97 %  BP: (103-141)/(50-90) 120/68  Arterial Line BP: (108-131)/(50-64) 131/63  Weight: 95.3 kg (210 lb)  Body mass index is 29.29 kg/m².    Date 01/25/25 0700 - 01/26/25 0659   Shift 6621-2646 5890-8050 9071-5864 24 Hour Total   INTAKE   IV Piggyback 900   900   Shift Total(mL/kg) 900(9.4)   900(9.4)   OUTPUT   Urine(mL/kg/hr) 1800(2.4)   1800   Shift Total(mL/kg) 1800(18.9)   1800(18.9)   Weight (kg) 95.3 95.3 95.3 95.3          Drains       Drain  Duration                  Urethral Catheter 01/25/25 0807 Non-latex 16 Fr. <1 day                     Physical Exam  Constitutional:       General: He is not in acute  "distress.  HENT:      Head: Normocephalic and atraumatic.      Nose: Nose normal.   Eyes:      Conjunctiva/sclera: Conjunctivae normal.   Cardiovascular:      Rate and Rhythm: Normal rate.   Pulmonary:      Effort: Pulmonary effort is normal. No respiratory distress.   Genitourinary:     Comments: 16Fr cedillo catheter in place with thin red urine   Musculoskeletal:         General: No deformity.   Skin:     General: Skin is warm and dry.   Neurological:      General: No focal deficit present.      Mental Status: He is alert.   Psychiatric:         Mood and Affect: Mood normal.         Behavior: Behavior normal.          Significant Labs:    BMP:  No results for input(s): "NA", "K", "CL", "CO2", "BUN", "CREATININE", "LABGLOM", "GLUCOSE", "CALCIUM" in the last 168 hours.    CBC:  No results for input(s): "WBC", "HGB", "HCT", "PLT" in the last 168 hours.    All pertinent labs results from the past 24 hours have been reviewed.    Significant Imaging:  All pertinent imaging results/findings from the past 24 hours have been reviewed.                    Assessment and Plan:     Gross hematuria  74yoM consult for gross hematuria     - Small amount of clot irrigated from bladder. Catheter irrigated to clear.    - Cedillo catheter removed by urology    - Please perform voiding trial. Perform bladder scan after patient voids to ensure emptying   - Ok for discharge from urological standpoint if passes void trial   - Will arrange for follow up with urology for discussion of gross hematuria work up and LUTS   - Rest of care per primary         VTE Risk Mitigation (From admission, onward)           Ordered     apixaban tablet 5 mg  2 times daily         01/25/25 1159     heparin infusion 1,000 units/500 ml in 0.9% NaCl (on sterile field)  As needed (PRN)         01/25/25 1059     heparin infusion 1,000 units/500 ml in 0.9% NaCl (on sterile field)  As needed (PRN)         01/25/25 1100                    Thank you for your consult. I " will sign off. Please contact us if you have any additional questions.    Roberto Trejo MD  Urology  [facility] - Cardiology Stepdown

## 2025-01-25 NOTE — PROGRESS NOTES
Spoke with urology on call, Dr. Trejo, about ordered urology consult. MD said they are aware and currently in the OR but will see patient as soon as available.

## 2025-01-25 NOTE — PROGRESS NOTES
Patient arrived to EP PACU in stable condition. Report received from procedural RN and anesthesia provider. Bilateral groin sites w/ fig 8 suture and stopcock CDI. Pt educated on groin sites and importance of keeping BLE still and straight. Elias to gravity w/ bloody urine noted to bag and janine blood oozing from penis. Dr. Giles aware and urology consult noted. Continuous cardiac monitoring in place. Safety measures verified. PACU BCGs maintained. RN at bedside. Will continue to monitor.

## 2025-01-25 NOTE — BRIEF OP NOTE
: Teo Giles MD  Date of Procedure: 01/25/2025    Post-operative Diagnosis: AF/AFL    Procedure Performed: Pulmonary vein isolation of all 4 pulmonary veins & CTI ablations     Description of Procedure: The patient was brought to the EP lab in the fasting state. Prepped and draped in sterile fashion. Safety timeout was performed. Sedation administered by anesthesia staff. Ultrasound guided venous access of the bilateral femoral veins was performed. ICE and CS catheters placed via left femoral vein access. Halo catheter placed via right femoral vein. RFA to the CTI with confirmation of bidirectional block. Heparin bolus and continuous infusion per protocol. Long sheaths via right femoral venous access. Two transseptal punctures performed using combination of fluoroscopic and ICE guidance. Map created. RFA to isolate all pulmonary veins. ICE confirmed no significant pericardial effusion.     EBL: <10 mL    Specimens: none  Complications: no immediate    Plan:  Bedrest x 4 hrs  Remove sutures at 4 hours post-procedure  Ambulation at 6 hours post-procedure if there is no evidence of access site complications  Close monitoring of hemodynamics, access site, and neurologic status  ECG upon arrival to PACU  Patient to resume OAC this evening. If bleeding or hematoma formation, please notify EP service before holding OAC  Medication changes: initiation of Protonix 40 mg daily x 30 days  Recommend ibuprofen 800 mg TID x 3 days for pericarditis post-procedure  Plan for discharge following bedrest if patient tolerating PO intake, voiding, and ambulatory without evidence of complications       Annabelle Fontaine MD   Cardiology, PGY5   [facility]

## 2025-01-25 NOTE — H&P
[facility] [place]  Electrophysiology  H&P      Nader Clarke   YOB: 1950   Medical Record Number: [id number]   Attending Physician:    Date of Admission: 01/25/2025       Hospital Day:  0  Current Principal Problem:  <principal problem not specified>      History     Cc: atrial fibrillation     HPI  Mr Nader Clarke is a 74 year old gentleman with PMH of atrial fibrillation, HTN, COPD, CKD, chronic HBV, hepatocellular carcinoma who presents to [facility] for atrial fibrillation ablation with Dr. Giles. History of paroxysmal AF and AFL recurrent following cardioversion. He was offered PVI/CTI ablation by Dr. Giles and agreed to proceed.       Presenting EKG: Atrial flutter  CHADS-VASc: 2 (age, HTN)   Anticoagulants: Apixaban 5 mg BID   Antiarrhythmics: Flecainide 100 mg BID   LV EF: 55-60% (MARILYNN 11/2024)   Pertinent labs: Hgb 12.2, INR 1.1, Cr 2.1         Medications - Outpatient  Prior to Admission medications    Medication Sig Start Date End Date Taking? Authorizing Provider   allopurinol (ZYLOPRIM) 100 MG tablet Take 100 mg by mouth Daily.   Yes Provider, Historical   apixaban (ELIQUIS) 5 mg Tab Take 1 tablet (5 mg total) by mouth 2 (two) times daily. 12/25/24  Yes Teo Agrawal MD   ascorbic Acid (VITAMIN C) 500 mg CpSR Take 500 mg by mouth once daily.   Yes Provider, Historical   cetirizine (ZYRTEC) 5 MG tablet Take 1 tablet (5 mg total) by mouth once daily. To help w/ allergy/sinus 10/26/24  Yes Tad Rebolledo MD   cyanocobalamin (VITAMIN B-12) 100 MCG tablet Take 100 mcg by mouth once daily.   Yes Provider, Historical   doxazosin (CARDURA) 8 MG Tab Take 8 mg by mouth every evening. 1/12/17  Yes Provider, Historical   entecavir (BARACLUDE) 1 MG Tab Take 1 mg by mouth once daily. 10/1/24  Yes Provider, Historical   ergocalciferol (ERGOCALCIFEROL) 50,000 unit Cap Take 1 capsule (50,000 Units total) by mouth every 7 days. 12/14/17  Yes Shashank Tran MD   ferrous sulfate (SLOW  RELEASE IRON) 142 mg (45 mg iron) TbSR Take 1 tablet by mouth once daily.   Yes Provider, Historical   flecainide (TAMBOCOR) 100 MG Tab Take 1 tablet (100 mg total) by mouth 2 (two) times daily. 11/12/24 11/7/25 Yes Teo Giles MD   fluticasone propionate (FLONASE) 50 mcg/actuation nasal spray 2 sprays (100 mcg total) by Each Nostril route every evening. To help w/ allergy/sinus 10/26/24  Yes Tda Rebolledo MD   furosemide (LASIX) 40 MG tablet Take 1 tablet (40 mg total) by mouth once daily. 11/20/24  Yes Teo Agrawal MD   guaiFENesin (MUCINEX) 600 mg 12 hr tablet Take 1 tablet (600 mg total) by mouth 2 (two) times daily. To help clear sinus and chest congestion 10/26/24  Yes Tad Rebolledo MD   hydrALAZINE (APRESOLINE) 50 MG tablet TAKE 1 TABLET BY MOUTH THREE TIMES A DAY HOLD IF BLOOD PRESSURE IS LESS THAN 130 SYSTOLIC. 3/8/17  Yes Provider, Historical   metoprolol tartrate (LOPRESSOR) 100 MG tablet Take 1 tablet (100 mg total) by mouth 2 (two) times a day. For blood pressure and heart rate control 11/20/24 11/20/25 Yes Teo Agrawal MD   multivitamin with minerals tablet Take 1 tablet by mouth once daily.   Yes Provider, Historical   oxyCODONE-acetaminophen (PERCOCET) 5-325 mg per tablet Take 1 tablet by mouth every 4 (four) hours as needed. 7/26/24  Yes Provider, Historical   pantoprazole (PROTONIX) 40 MG tablet Take 1 tablet by mouth once daily. 7/8/24  Yes Provider, Historical   sodium chloride (SALINE NASAL) 0.65 % nasal spray 1 spray by Nasal route as needed for Congestion. To help reduce need for Afrin 10/26/24   Tad Rebolledo MD         Medications - Current  Scheduled Meds:  Continuous Infusions:  PRN Meds:.      Allergies  Review of patient's allergies indicates:   Allergen Reactions    Augmentin [amoxicillin-pot clavulanate] Hives and Rash         Past Medical History  Past Medical History:   Diagnosis Date    Arthritis of big toe     RIGHT FOOT BIG TOE    Chronic  hepatitis B     Chronic rhinosinusitis     Gout     Hypertension     Vitamin D deficiency          Past Surgical History  Past Surgical History:   Procedure Laterality Date    BALLOON SINUPLASTY OF PARANASAL SINUS      CARDIOVERSION N/A 10/24/2024    Procedure: Cardioversion;  Surgeon: Teo Agrawal MD;  Location: [facility] CATH LAB;  Service: Cardiology;  Laterality: N/A;    CARPUL TUNNEL      RIGHT HAND    ECHOCARDIOGRAM,TRANSESOPHAGEAL N/A 12/02/2024    Procedure: Transesophageal echo (MARILYNN) intra-procedure log documentation;  Surgeon: Teo Ware MD;  Location: [facility] EP LAB;  Service: Cardiology;  Laterality: N/A;    NERVE REPAIR      RIGHT ARM    perforated bowel  2013    RIGHT KNEE REPLACEMENT 7/21/2015      TOTAL KNEE ARTHROPLASTY Left 2020    TRANSESOPHAGEAL ECHOCARDIOGRAM WITH POSSIBLE CARDIOVERSION (MARILYNN W/ POSS CARDIOVERSION) N/A 10/24/2024    Procedure: Transesophageal echo (MARILYNN) intra-procedure log documentation;  Surgeon: Teo Agrawal MD;  Location: [facility] CATH LAB;  Service: Cardiology;  Laterality: N/A;    TREATMENT OF CARDIAC ARRHYTHMIA N/A 12/02/2024    Procedure: Cardioversion or Defibrillation;  Surgeon: Jeff Lee MD;  Location: [facility] EP LAB;  Service: Cardiology;  Laterality: N/A;  AF, MARILYNN, DCCV, MAC, MB, 3 Prep         Social History  Social History     Socioeconomic History    Marital status:    Tobacco Use    Smoking status: Every Day     Current packs/day: 2.00     Average packs/day: 2.0 packs/day for 1.1 years (2.1 ttl pk-yrs)     Types: Cigars, Cigarettes     Start date: 2024     Last attempt to quit: 7/6/2016    Smokeless tobacco: Current    Tobacco comments:     cigars   Substance and Sexual Activity    Alcohol use: Not Currently     Alcohol/week: 6.0 standard drinks of alcohol     Types: 6 Shots of liquor per week     Comment: weekly    Drug use: No    Sexual activity: Yes     Partners: Female     Social Drivers of Health     Financial Resource Strain: Medium  "Risk (11/11/2024)    Overall Financial Resource Strain (CARDIA)     Difficulty of Paying Living Expenses: Somewhat hard   Food Insecurity: No Food Insecurity (11/11/2024)    Hunger Vital Sign     Worried About Running Out of Food in the Last Year: Never true     Ran Out of Food in the Last Year: Never true   Transportation Needs: No Transportation Needs (10/26/2024)    TRANSPORTATION NEEDS     Transportation : No   Physical Activity: Sufficiently Active (11/11/2024)    Exercise Vital Sign     Days of Exercise per Week: 7 days     Minutes of Exercise per Session: 30 min   Stress: No Stress Concern Present (11/11/2024)    [employer] - Occupational Stress Questionnaire     Feeling of Stress : Only a little   Housing Stability: Low Risk  (11/11/2024)    Housing Stability Vital Sign     Unable to Pay for Housing in the Last Year: No     Homeless in the Last Year: No         ROS  10 point ROS performed and negative except as stated in HPI     Physical Examination         Vital Signs  Vitals  Temp: 98.4 °F (36.9 °C)  Temp Source: Temporal  Pulse: 107  Heart Rate Source: Monitor  Resp: 18  SpO2: 95 %  BP: (!) 138/90  MAP (mmHg): 110  BP Location: Left arm  BP Method: Automatic  Patient Position: Lying          24 Hour VS Range    Temp:  [98.4 °F (36.9 °C)]   Pulse:  [107]   Resp:  [18]   BP: (138-141)/(83-90)   SpO2:  [95 %]     Intake/Output Summary (Last 24 hours) at 1/25/2025 0652  Last data filed at 1/25/2025 0550  Gross per 24 hour   Intake --   Output 500 ml   Net -500 ml           Physical Exam:   Constitutional: no acute distress  HEENT: NCAT, EOMI, no scleral icterus  Cardiovascular: Tachycardic, regular rhythm   Pulmonary: Normal respiratory effort   Abdomen: nontender, non-distended   Neuro: alert and oriented, no focal deficits  Extremities: warm, no edema   MSK: no deformities  Integument: intact, no rashes       Data       No results for input(s): "WBC", "HGB", "HCT", "PLT" in the " "last 168 hours.     No results for input(s): "PROTIME", "INR" in the last 168 hours.     No results for input(s): "NA", "K", "CL", "CO2", "BUN", "CREATININE", "ANIONGAP", "CALCIUM" in the last 168 hours.     No results for input(s): "PROT", "ALBUMIN", "BILITOT", "ALKPHOS", "AST", "ALT" in the last 168 hours.     No results for input(s): "TROPONINI" in the last 168 hours.     BNP (pg/mL)   Date Value   12/06/2024 1,316 (H)   12/04/2024 815 (H)   11/27/2024 1,119 (H)   11/13/2024 759 (H)   11/06/2024 670 (H)       No results for input(s): "LABBLOO" in the last 168 hours.         Assessment & Plan   74 year old gentleman with PMH of atrial fibrillation, HTN, COPD, CKD, chronic HBV, hepatocellular carcinoma who presents to [facility] for atrial fibrillation ablation with Dr. Giles.    Atrial fibrillation/flutter:   Risks/benefits/alternatives discussed with patient and he agrees to proceed. Consents signed.   -To EP lab for PVI/CTI ablation   -MARILYNN prior to rule out SURAJ thrombus       Gokul Benson MD  [facility]  Electrophysiology, PGY-VII      "

## 2025-01-25 NOTE — ANESTHESIA PREPROCEDURE EVALUATION
01/25/2025  Nader Clarke is a 74 y.o., male.  Patient Active Problem List   Diagnosis    Chronic viral hepatitis B without delta agent and without coma    Vitamin D deficiency    Primary osteoarthritis of right knee    Hx of bariatric surgery    Essential hypertension    CKD (chronic kidney disease), stage III    Paroxysmal atrial fibrillation with RVR    Aortic valve stenosis    Acute CHF    Tobacco dependence due to cigarettes    Advance care planning    Situational disturbance    Macrocytic anemia    Chronic rhinosinusitis    Viral upper respiratory tract infection    Rhinitis medicamentosa    [employer] (acute kidney injury)    Tobacco abuse    S/P subtotal gastrectomy    Chronic obstructive pulmonary disease    Hepatocellular carcinoma           Pre-op Assessment    I have reviewed the Patient Summary Reports.       I have reviewed the Medications.     Review of Systems  Anesthesia Hx:  No problems with previous Anesthesia               Denies Personal Hx of Anesthesia complications.                    Cardiovascular:     Hypertension       CHF                Congestive Heart Failure (CHF)                Hypertension     Atrial Fibrillation     Pulmonary:   COPD         Chronic Obstructive Pulmonary Disease (COPD):                      Renal/:  Chronic Renal Disease        Kidney Function/Disease             Hepatic/GI:      Liver Disease, Hepatitis           Liver Disease, Hepatitis        Musculoskeletal:  Arthritis        Arthritis          Neurological:      Arthritis                           Psych:  Psychiatric History                  Physical Exam    Airway:  No airway management difficulties anticipated  Dental:  No active dental issues noted  Chest/Lungs:  Clear to auscultation    Heart:  Rate: Normal  Rhythm: Regular Rhythm  Sounds: Normal        Anesthesia Plan  Type of Anesthesia, risks &  benefits discussed:    Anesthesia Type: Gen ETT  Intra-op Monitoring Plan: Art Line  Informed Consent: Informed consent signed with the Patient and all parties understand the risks and agree with anesthesia plan.  All questions answered.   ASA Score: 3  Anesthesia Plan Notes: Chart reviewed. Patient seen and examined. Anesthesia plan discussed and questions answered. E-consent signed. Leonidas Faye MD    Ready For Surgery From Anesthesia Perspective.     .

## 2025-01-25 NOTE — NURSING
Pt stated he had a fall Wednesday 1/22/25. Pt reported hitting his head, visible bruise noted to face.  Pt c/o headache, roughly only one hour after the fall but reports no LOC.    Marcelino NEGRON aware.

## 2025-01-25 NOTE — PROGRESS NOTES
Sachin gtt infusing on arrival at 0.5mcg/kg/min. Gtt titrated down since arrival and turned off completely at this time. Dr. Faye notified.

## 2025-01-25 NOTE — ANESTHESIA POSTPROCEDURE EVALUATION
Anesthesia Post Evaluation    Patient: Nader Clarke    Procedure(s) Performed: Procedure(s) (LRB):  Ablation atrial fibrillation (N/A)  Ablation, Atrial Flutter, Typical (N/A)  Transesophageal echo (MARILYNN) intra-procedure log documentation (N/A)    Final Anesthesia Type: general      Patient participation: Yes- Able to Participate  Level of consciousness: awake and alert  Post-procedure vital signs: reviewed and stable  Pain control: Pain has been treated.  Airway patency: patent    PONV status: Absent or treated.  Anesthetic complications: no      Cardiovascular status: hemodynamically stable  Respiratory status: unassisted  Hydration status: euvolemic  Follow-up not needed.              Vitals Value Taken Time   /68 01/25/25 1401   Temp 36.6 °C (97.9 °F) 01/25/25 1129   Pulse 61 01/25/25 1402   Resp 19 01/25/25 1217   SpO2 96 % 01/25/25 1402   Vitals shown include unfiled device data.      No case tracking events are documented in the log.      Pain/Leodan Score: Pain Rating Prior to Med Admin: 6 (1/25/2025 12:17 PM)  Pain Rating Post Med Admin: 5 (1/25/2025 12:30 PM)  Leodan Score: 9 (1/25/2025  1:00 PM)

## 2025-01-25 NOTE — PROGRESS NOTES
Pt voided 75 ml red urine w/ large clot in urinal. Post void bladder scan showed 0-17ml. Dr. Trejo notified and said to repeat bladder scan after next void and notify urology on call with results. Nursing communication placed. Will proceed with POC to transfer to assigned room.

## 2025-01-25 NOTE — ANESTHESIA RELEASE NOTE
"Anesthesia Release from PACU Note    Patient: Nader Clarke    Procedure(s) Performed: Procedure(s) (LRB):  Ablation atrial fibrillation (N/A)  Ablation, Atrial Flutter, Typical (N/A)  Transesophageal echo (MARILYNN) intra-procedure log documentation (N/A)    Anesthesia type: general    Post pain: Adequate analgesia    Post assessment: no apparent anesthetic complications    Last Vitals: Visit Vitals  /63   Pulse 62   Temp 36.6 °C (97.9 °F) (Temporal)   Resp 19   Ht 5' 11" (1.803 m)   Wt 95.3 kg (210 lb)   SpO2 97%   BMI 29.29 kg/m²       Post vital signs: stable    Level of consciousness: awake, alert , and oriented    Nausea/Vomiting: no nausea/no vomiting    Complications: none    Airway Patency: patent    Respiratory: unassisted    Cardiovascular: stable and blood pressure at baseline    Hydration: euvolemic  "

## 2025-01-25 NOTE — PROGRESS NOTES
Bilateral groin sutures w/ stopcocks removed per protocol. No active bleeding or hematoma noted. Dry gauze with clear tegaderm applied.  Patient tolerated well. HOB raised to 30deg. Will continue to monitor.

## 2025-01-25 NOTE — ASSESSMENT & PLAN NOTE
74yoM consult for gross hematuria     - Small amount of clot irrigated from bladder. Catheter irrigated to clear.    - Elias catheter removed by urology    - Please perform voiding trial. Perform bladder scan after patient voids to ensure emptying   - Ok for discharge from urological standpoint if passes void trial   - Will arrange for follow up with urology for discussion of gross hematuria work up and LUTS   - Rest of care per primary

## 2025-01-25 NOTE — ANESTHESIA PROCEDURE NOTES
Arterial    Diagnosis: AF (paroxysmal atrial fibrillation) [I48.0]      Typical atrial flutter [I48.3]    Patient location during procedure: done in OR  Timeout: 1/25/2025 7:42 AM  Procedure end time: 1/25/2025 7:44 AM    Staffing  Authorizing Provider: Leonidas Faye MD  Performing Provider: Edin Arechiga CRNA    Staffing  Performed by: Edin Arechiga CRNA  Authorized by: Leonidas Faye MD    Anesthesiologist was present at the time of the procedure.    Preanesthetic Checklist  Completed: patient identified, IV checked, site marked, risks and benefits discussed, surgical consent, monitors and equipment checked, pre-op evaluation, timeout performed and anesthesia consent givenArterial  Skin Prep: alcohol swabs  Local Infiltration: none  Orientation: right  Location: radial    Catheter Size: 20 G  Catheter placement by Ultrasound guidance. Heme positive aspiration all ports.   Vessel Caliber: patent, compressibility normal  Needle advanced into vessel with real time Ultrasound guidance.Insertion Attempts: 1  Assessment  Dressing: secured with tape and tegaderm  Additional Notes  Performed by Dr Faye

## 2025-01-25 NOTE — HPI
74yoM consult for gross hematuria in cedillo. Had catheter placed today for ablation with EP, noted that after catheter placement some blood noted in tube. Patient received heparin during procedure. Post procedurally noted that he had worsened hematuria in catheter. Denies any prior urologic history. Does endorse LUTS, has weak FOS and intermittency at baseline. Does not take flomax. Has significant smoking history. No family history of  malignancy.     On exam is AFVSS, RCIB, cedillo with thin red urine in tube. No recent labs or imaging. Urology consulted for recommendations.

## 2025-01-25 NOTE — ANESTHESIA PROCEDURE NOTES
Intubation    Date/Time: 1/25/2025 7:38 AM    Performed by: Edin Arechiga CRNA  Authorized by: Leonidas Faye MD    Intubation:     Induction:  Intravenous    Intubated:  Postinduction    Mask Ventilation:  Easy mask    Attempts:  1    Attempted By:  CRNA    Method of Intubation:  Video laryngoscopy    Blade:  Pineda 3    Laryngeal View Grade: Grade I - full view of cords      Difficult Airway Encountered?: No      Complications:  None    Airway Device:  Oral endotracheal tube    Airway Device Size:  7.5    Style/Cuff Inflation:  Cuffed (inflated to minimal occlusive pressure)    Tube secured:  23    Secured at:  The lips    Placement Verified By:  Capnometry    Complicating Factors:  None    Findings Post-Intubation:  BS equal bilateral and atraumatic/condition of teeth unchanged

## 2025-01-25 NOTE — TRANSFER OF CARE
Anesthesia Transfer of Care Note    Patient: Nader Clarke    Procedure(s) Performed: Procedure(s) (LRB):  Ablation atrial fibrillation (N/A)  Ablation, Atrial Flutter, Typical (N/A)  Transesophageal echo (MARILYNN) intra-procedure log documentation (N/A)    Patient location: PACU    Anesthesia Type: general    Transport from OR: Transported from OR on 6-10 L/min O2 by face mask with adequate spontaneous ventilation    Post pain: adequate analgesia    Post assessment: no apparent anesthetic complications and tolerated procedure well    Post vital signs: stable    Level of consciousness: awake    Nausea/Vomiting: no nausea/vomiting    Complications: none    Transfer of care protocol was followed      Last vitals:

## 2025-01-26 VITALS
HEIGHT: 71 IN | SYSTOLIC BLOOD PRESSURE: 138 MMHG | BODY MASS INDEX: 29.42 KG/M2 | WEIGHT: 210.13 LBS | HEART RATE: 65 BPM | DIASTOLIC BLOOD PRESSURE: 90 MMHG

## 2025-01-26 VITALS
HEART RATE: 70 BPM | OXYGEN SATURATION: 95 % | SYSTOLIC BLOOD PRESSURE: 140 MMHG | HEIGHT: 71 IN | DIASTOLIC BLOOD PRESSURE: 84 MMHG | RESPIRATION RATE: 20 BRPM | WEIGHT: 210 LBS | TEMPERATURE: 98 F | BODY MASS INDEX: 29.4 KG/M2

## 2025-01-26 LAB
OHS QRS DURATION: 96 MS
OHS QTC CALCULATION: 437 MS

## 2025-01-26 PROCEDURE — 25000003 PHARM REV CODE 250: Performed by: INTERNAL MEDICINE

## 2025-01-26 RX ORDER — SUCRALFATE 1 G/1
1 TABLET ORAL 4 TIMES DAILY
Qty: 120 TABLET | Refills: 0 | Status: SHIPPED | OUTPATIENT
Start: 2025-01-26 | End: 2025-02-25

## 2025-01-26 RX ADMIN — APIXABAN 5 MG: 5 TABLET, FILM COATED ORAL at 08:01

## 2025-01-26 RX ADMIN — FUROSEMIDE 40 MG: 40 TABLET ORAL at 08:01

## 2025-01-26 RX ADMIN — PANTOPRAZOLE SODIUM 40 MG: 40 TABLET, DELAYED RELEASE ORAL at 08:01

## 2025-01-26 RX ADMIN — METOPROLOL SUCCINATE 50 MG: 50 TABLET, EXTENDED RELEASE ORAL at 08:01

## 2025-01-26 RX ADMIN — FLECAINIDE ACETATE 100 MG: 100 TABLET ORAL at 08:01

## 2025-01-26 RX ADMIN — OXYCODONE HYDROCHLORIDE AND ACETAMINOPHEN 1 TABLET: 5; 325 TABLET ORAL at 02:01

## 2025-01-26 NOTE — ASSESSMENT & PLAN NOTE
PROGRESS  NOTE      HISTORY    Lars Diego is a 67 year old male who presents after recent nephrectomy which is consistent with stage IV adenocarcinoma of lung.patient was earlier seen at [facility] when he was noted to have  Renal mass and was suspected of having RCC.      Mr. Noel is a 67-year-old male with past medical history significant for ischemic cardiomyopathy, STEMI, encephalopathy, history of alcohol abuse, anemia, COPD, hypertension, dyslipidemia arthritis coronary artery disease who was recently evaluated by primary care Services on 09/18/2019 for complain of hoarseness of voice associated with sore throat . Patient had a chest x-ray which was suggestive of collapse of left upper lobe.  A CT scan was recommended for further characterization Patient was thereafter referred to ENT Services who evaluated the patient on 09/25/2019  Patient does have significant history of smoking of about 10 pack-years and distant history of alcohol abuse and patient was found to have left vocal cord paralysis by ENT Services on laryngoscopy.  The process in the mediastinum and lung was suspected to be the cause for vocal cord paralysis thereafter patient was referred to Pulmonary Services  Patient underwent a CT chest and was noted to have collapse of left upper lobe with calcified thoracic lymphadenopathy and further workup was warranted for which patient underwent bronchoscopy on 10/11/2019.     On 10/11/2019 patient underwent bronchoscopy with endobronchial biopsy of left upper lobe along with bronchioloalveolar lavage  which was sent for cytology the pathology was positive for left upper lobe invasive squamous cell carcinoma moderately differentiated.  With tumor proportion score of 4% and solid tumor NGS sequencing for mutation positive for TP53 deletion Exon 7.  Of note it was negative for AKT1, GNA11, KIT, PIK3CA, ERBB2, RET, FOXL2, MET, GNAQ, PDGFRA,     TP53, BRAF (including V600E), EGFR (deletions, insertions,  Urology consulted with recommendations for voiding trial and OP follow up    -- Passed voiding trial and hematuria cleared   -- will place OP follow up with urology   T790M), NRAS (Exon     2,3,4), KRAS (Exon 2,3,4).       Patient underwent ultrasound of abdomen on 19 which was suggestive of 3.8 cm mass lately in left lower pole of the right kidney for which he was recommended on CT renal mass protocol.  On the same day he underwent CT abdomen pelvis with and without contrast which was indicative of heterogeneously enhancing mass in the interpolar right kidney.  The mass is hypo enhancing compared with normal renal cortex measuring 5 cm x 3.5 cm x 3.7 cm and is highly suspicious of renal cell carcinoma.     On 10/17/2019 patient underwent MRI brain which was negative for any metastasis but did show periventricular white matter lesions which were nonspecific.     On 10/18/2019 patient underwent PET scan imaging which showed large hypermetabolic lesion in left upper lobe extending from the hilum.  Abnormal FDG uptake in lymph node in left anterior mediastinum and left supraclavicular chest.  Hypermetabolic activity from the right vocal cord secondary to parlysis of left vocal cord.        On 10/04/2019 patient underwent EGD and endoscopic ultrasound.  Patient also underwent balloon dilatation of distal esophagus and was noted to have a Schatzki's ring along with gastritis patient in the same procedure underwent endoscopic ultrasound and fine-needle aspiration of the mediastinal lymphadenopathy the biopsy was consistent with scanty cellular sample demonstrating benign squamous cells and significant lymphoid material not identified    10/31/19 p patient underwent right nephrectomy laparoscopically and biopsy of the nephrectomy specimen is consistent with squamous cell carcinoma moderately differentiated measuring 5 cm negative margin       Patient has about 20 lb of weight lost    Patient is having dizziness and is borderline hypotensive on clinic visit today with systolic blood pressure in 80s.  Discussed with patient intravenous hydration over the weekend and to administer the 1st  cycle of chemotherapy on 12/03/2019 months the vital signs stabilized and patient's renal function improves with IV hydration over the weekend and monitor hyponatremia.        Patient Active Problem List    Diagnosis Date Noted   • Alcohol dependence (CMS/[facility])      Priority: Medium     Class: Chronic   • Anemia 05/28/2013     Priority: Medium   • Dehydration 11/29/2019     Priority: Low   • Squamous cell carcinoma of kidney, right (CMS/HCC) 11/21/2019     Priority: Low   • Primary malignant neoplasm of left upper lobe of lung (CMS/HCC) 10/23/2019     Priority: Low   • Renal cell carcinoma (CMS/HCC) 10/23/2019     Priority: Low   • Hyponatremia 10/02/2019     Priority: Low   • Tobacco abuse 10/01/2019     Priority: Low   • Left inguinal hernia 02/22/2016     Priority: Low   • Benign skin lesion 09/29/2015     Priority: Low   • Erectile dysfunction 09/14/2015     Priority: Low   • S/P total hip arthroplasty 02/16/2015     Priority: Low   • Dyslipidemia 12/02/2013     Priority: Low   • COPD (chronic obstructive pulmonary disease) (CMS/HCC) 05/28/2013     Priority: Low   • Coronary atherosclerosis of unspecified type of vessel, native or graft 11/05/2012     Priority: Low   • Essential hypertension, benign 11/05/2012     Priority: Low   • Unspecified disorder of lipoid metabolism 11/05/2012     Priority: Low   • Alcohol abuse, unspecified 11/05/2012     Priority: Low         I have reviewed the past medical, family and social history sections including the medications and allergies listed in the above medical record as well as nursing notes.   ALLERGIES:  No Known Allergies    Current Outpatient Medications   Medication Sig Dispense Refill   • Magnesium Hydroxide (MILK OF MAGNESIA PO)      • cyclobenzaprine (FLEXERIL) 10 MG tablet Take 1 tablet by mouth nightly as needed for Muscle spasms. 15 tablet 0   • ondansetron (ZOFRAN ODT) 8 MG disintegrating tablet Place 1 tablet onto the tongue every 8 hours as needed for  Nausea. 20 tablet 3   • lidocaine-prilocaine (EMLA) cream Place a dime size amount of cream over the port, 45-60 minutes prior to access. Cover with plastic wrap. 30 g 0   • prochlorperazine (COMPAZINE) 10 MG tablet TAKE 1 TABLET BY MOUTH EVERY 6 HOURS AS NEEDED FOR NAUSEA OR VOMITING 90 tablet 3   • albuterol 108 (90 Base) MCG/ACT inhaler Inhale 2 puffs into the lungs every 4 hours as needed for Shortness of Breath or Wheezing. 8.5 g 0   • sodium chloride 1 g tablet Take 1 tablet by mouth 3 times daily. 90 tablet 0   • pantoprazole (PROTONIX) 40 MG tablet Take 1 tablet by mouth 2 times daily before breakfast and at night. 60 tablet 1   • diphenhydramine-acetaminophen (TYLENOL PM EXTRA STRENGTH)  MG Tab Take 1 tablet by mouth nightly as needed.      • melatonin 5 MG Take 5 mg by mouth nightly.     • pravastatin (PRAVACHOL) 40 MG tablet Take 1 tablet by mouth every evening. 90 tablet 3   • metoPROLOL tartrate (LOPRESSOR) 25 MG tablet Take 1 tablet by mouth 2 times daily. 180 tablet 3   • lisinopril (ZESTRIL) 2.5 MG tablet Take 1 tablet by mouth daily. 90 tablet 3   • folic acid (FOLATE) 1 MG tablet Take 1 tablet by mouth daily. 90 tablet 3   • Thiamine HCl (VITAMIN B-1) 50 MG tablet Take 50 mg by mouth daily.     • aspirin 81 MG tablet Take 81 mg by mouth daily.     • senna (SENNA LAX) 8.6 MG tablet Take 1 tablet by mouth at bedtime. 60 tablet 1   • traMADol (ULTRAM) 50 MG tablet Take 1 tablet by mouth every 8 hours as needed for Pain. 20 tablet 0   • oxyCODONE, IMM REL, 10 MG immediate release tablet Take 1 tablet by mouth every 6 hours as needed for Pain. 24 tablet 0   • docusate sodium (COLACE) 100 MG capsule Take 1 capsule by mouth 2 times daily as needed for Constipation. 20 capsule 0   • amoxicillin (AMOXIL) 500 MG capsule Take 4 capsules 1 hour prior to dental procedures 16 capsule 0     No current facility-administered medications for this visit.            REVIEW OF SYSTEMS    Review of Systems       11/29/19 0944   Auditory/Ear   Tinnitus 0   Hearing Impaired None Present   Constitutional   Fatigue Grade 2   Fever None Present   Insomnia None Present   Chills None Present   Dermatology/Skin   Rash Acneiform None Present   Alopecia None Present   Bruising None Present   Skin Hyperpigmentation None Present   Injection Site Reaction None Present   Nail Discoloration None Present   Pruritus None Present   Urticaria None Present   Endocrine   Hot Flashes Grade 1   Gastrointestinal   Anorexia Grade 2   Constipation Grade 1   Dehydration None Present   Diarrhea None Present   Dry Mouth None Present   Dysphagia None Present   Mucositis Oral None Present   Nausea Grade 1   Vomiting None Present   Hemorrhage/Bleeding   Gastrointestinal Pain None Present   - Bladder None Present   Petechiae None Present   Lymphatics   Edema Limbs None Present   Lymphedema None Present   Musculoskeletal   Generalized Muscle Weakness Grade 1   Neurology   Ataxia None Present   Confusion None Present   Dizziness Grade 1  (when standing)   Memory Impairment None Present   Peripheral Sensory Neuropathy None Present   - Depression None Present   - Anxiety None Present   Numbness Grade 1  (fingers, chronic)   Tingling None Present   Ocular   Blurred Vision None Present   Diplopia None Present   Dry Eye None Present   Photophobia None Present   Watering Eyes None Present   Pain   Abdominal Pain None Present   Bone Pain None Present   Chest Wall Pain None Present   - Muscle None Present   - Joint None Present   - Head/headache None Present   Pain None Present   Pulmonary/Upper Respiratory   Bronchospasm None Present   Cough Grade 1   Dyspnea Grade 2   Hiccups None Present   Genitourinary   Urinary Incontinence None Present   Urinary Frequency None Present   Urinary Discoloration None Present       PHYSICAL EXAM    Visit Vitals  BP 93/69 (BP Location: LUE - Left upper extremity, Patient Position: Sitting, Cuff Size: Regular)   Pulse 82   Temp 97.9  °F (36.6 °C) (Oral)   Resp 20   Ht 5' 10\" (1.778 m)   Wt 71.8 kg   SpO2 98%   BMI 22.71 kg/m²      Visit Vitals  BP 93/69 (BP Location: LUE - Left upper extremity, Patient Position: Sitting, Cuff Size: Regular)   Pulse 82   Temp 97.9 °F (36.6 °C) (Oral)   Resp 20   Ht 5' 10\" (1.778 m)   Wt 71.8 kg   SpO2 98%   BMI 22.71 kg/m²     ECO - Ambulatory/capable of self-care, unable to perform work activities.  Up & about more than 50% of the day.    CONSTITUTIONAL:  Alert, cooperative, oriented.  Mood and affect appropriate.    Physical Exam  Constitutional:       Appearance: Normal appearance.      Comments: Patient seen in wheelchair and complains of feeling dizzy on standing up   HENT:      Head: Normocephalic.      Nose: Nose normal.      Mouth/Throat:      Mouth: Mucous membranes are moist.   Eyes:      Pupils: Pupils are equal, round, and reactive to light.   Neck:      Musculoskeletal: Normal range of motion.   Cardiovascular:      Rate and Rhythm: Normal rate.      Heart sounds: No murmur. No gallop.    Pulmonary:      Effort: Pulmonary effort is normal. No respiratory distress.      Breath sounds: No wheezing.   Abdominal:      General: Abdomen is flat.      Tenderness: There is no tenderness. There is no guarding or rebound.      Hernia: No hernia is present.      Comments: Healing port incisions as well as lower quadrant incision status post nephrectomy   Musculoskeletal: Normal range of motion.         General: No swelling or deformity.   Skin:     General: Skin is warm.   Neurological:      General: No focal deficit present.      Mental Status: He is alert and oriented to person, place, and time.   Psychiatric:         Mood and Affect: Mood normal.         Behavior: Behavior normal.          Labs:  Recent Results (from the past 24 hour(s))   CBC WITH DIFFERENTIAL    Collection Time: 19  9:21 AM   Result Value Ref Range    WBC 6.8 4.2 - 11.0 K/mcL    RBC 4.01 (L) 4.50 - 5.90 mil/mcL    HGB 13.1 13.0 -  17.0 g/dL    HCT 38.3 (L) 39.0 - 51.0 %    MCV 95.5 78.0 - 100.0 fl    MCH 32.7 26.0 - 34.0 pg    MCHC 34.2 32.0 - 36.5 g/dL    RDW-CV 11.9 11.0 - 15.0 %     140 - 450 K/mcL    Analyzer ANC 4.4 1.8 - 7.7 K/mcl    NRBC 0 0 /100 WBC    DIFF TYPE AUTOMATED DIFFERENTIAL     Neutrophil 65 %    LYMPH 17 %    MONO 11 %    EOSIN 6 %    BASO 1 %    Percent Immature Granuloctyes 0 %    Absolute Neutrophil 4.4 1.8 - 7.7 K/mcL    Absolute Lymph 1.2 1.0 - 4.0 K/mcL    Absolute Mono 0.8 0.3 - 0.9 K/mcL    Absolute Eos 0.4 0.1 - 0.5 K/mcL    Absolute Baso 0.1 0.0 - 0.3 K/mcL    Absolute Immature Granulocytes 0.0 0 - 0.2 K/mcl   COMPREHENSIVE METABOLIC PANEL    Collection Time: 11/29/19  9:21 AM   Result Value Ref Range    Sodium 129 (L) 135 - 145 mmol/L    Potassium 4.7 3.4 - 5.1 mmol/L    Chloride 95 (L) 98 - 107 mmol/L    Carbon Dioxide 30 21 - 32 mmol/L    Anion Gap 9 (L) 10 - 20 mmol/L    Glucose 95 65 - 99 mg/dL    BUN 21 (H) 6 - 20 mg/dL    Creatinine 1.48 (H) 0.67 - 1.17 mg/dL    GFR Estimate,  57     GFR Estimate, Non African American 49     BUN/Creatinine Ratio 14 7 - 25    CALCIUM 9.0 8.4 - 10.2 mg/dL    TOTAL BILIRUBIN 0.6 0.2 - 1.0 mg/dL    AST/SGOT 14 <38 Units/L    ALT/SGPT 14 <64 Units/L    ALK PHOSPHATASE 107 45 - 117 Units/L    TOTAL PROTEIN 7.2 6.4 - 8.2 g/dL    Albumin 3.3 (L) 3.6 - 5.1 g/dL    GLOBULIN 3.9 2.0 - 4.0 g/dL    A/G Ratio, Serum 0.8 (L) 1.0 - 2.4   MAGNESIUM    Collection Time: 11/29/19  9:21 AM   Result Value Ref Range    MAGNESIUM 2.6 (H) 1.7 - 2.4 mg/dL   PHOSPHORUS    Collection Time: 11/29/19  9:21 AM   Result Value Ref Range    PHOSPHORUS 3.6 2.4 - 4.7 mg/dL     [unfilled]  Imaging:  IMPRESSION:     1. Large hypermetabolic cancer in the left upper lobe extending from the  hilum.  2. Abnormal FDG uptake in lymph nodes in the left anterior mediastinum and  left supraclavicular chest.  3. Hypermetabolic activity from the right vocal cord likely related to  paralyzed left  vocal cord       ASSESSMENT/PLAN:  1) SQUAMOUS CELL CARCINOMA OF LEFT LUNG STAGE IV OLIGOMETASTATIC RIGHT RENAL METASTASIS STATUS POST METASTATECTOMY  -ECOG 2  -has 25 pack year of smoking history.  -status post bronchoscopy and tissue diagnosis.  Discussed the pathology with the patient  -bronchoscopy 10/11/2019 Lung, left upper lobe, biopsy:  Invasive squamous cell carcinoma, moderately differentiated.  Also discuss the pathology from recent nephrectomy specimen which is consistent with squamous cell carcinoma similar in morphology to the biopsy on 10/11/2019  -Tumor Proportion Score (TPS): 4 %   ,Intensity: 1+  -patient has undergone next generation sequencing of the tumor which is positive for TIER 3 TP53 mutation of unknown clinical significance.  And was found to be negative for  AKT1, GNA11, KIT, PIK3CA, ERBB2, RET, FOXL2, MET, GNAQ, PDGFRA,  TP53, BRAF  (including V600E), EGFR (deletions, insertions, T790M), NRAS (Exon   2,3,4), KRAS (Exon 2,3,4).r     -staging PET scan dated 10/18/2019 suggestive of large hypermetabolic cancer in left upper lobe extending from the hilum with abnormal FDG uptake in lymph node in left anterior mediastinum and left supraclavicular chest and hypermetabolic activity from right vocal cord.  -cN3 with ipsilateral supraclavicular lymph node involvement,  -monitor creatinine and renal function post nephrectomy and may have implication on dose adjustment.  Patient noted to have a bump in his creatinine post nephrectomy  -size of mass cannot be determine however T3 versus T4 lesion and with N3 status of the lymph node and with renal metastasis now M1C disease oligometastatic  -with stage IVB with M1c status status post metastatic ectomy, and no other metastatic lesion identified on the PET scan apart from renal metastasis which has been resected patient would be treated for thoracic disease definitive intent with concurrent chemoradiation as per stage IIIB disease.  Current NCCN  recommendation for limited distant metastasis recommend treatment as stage IV M1 B per NCCN guidelines non-small-cell lung cancerPLATE 17.  -preferred regimen for squamous cell carcinoma include paclitaxel 45-50 milligram/meter squared weekly with carboplatin AUC 2 along with concurrent radiation therapy with or without additional 2 cycles every 21 days of paclitaxel 200 milligram/meter sq and carboplatin AUC 6. Consolidation therapy for patient with unresectable stage III non-small cell lung cancer performance status 0-1 and no disease progression after 2 or more cycles of definitive chemoradiation includes durvalumab 10 milligram/kilogram IV every 2 weeks for up to 12 months which is a category 1 recommendation.  -these regimens are currently not included in via pathway  -NCCN PROTOCOL TEMPLATE NSC11a  -patient a candidate for concurrent chemoradiation and will plan on weekly carbo Taxol at this point in time followed by possible consolidation and maintenance durvalumab.  - plan to initiate chemotherapy on 12/03/2019 once renal function stabilizes with scheduled IV fluids over the weekend      Monitor with paclitaxel for hypersensitivity, liver function, peripheral neuropathy  Monitor with carboplatin hypersensitivity, renal function, electrolyte especially magnesium and potassium and for peripheral neuropathy     2) RIGHT RENAL MASS status post resection  -patient noted to have renal mass on evaluation for dysphagia and hyponatremia when adrenal mass was identified on ultrasound.  -abdominal pelvic CT consistent mass is hypo enhancing compared with normal renal cortex measuring 5 cm x 3.5 cm x 3.7 cm and is highly suspicious of renal cell carcinoma.  -patient evaluated by in-patient Urology Services when admitted to hospital and is currently planned on nephrectomy on 10/31/2019  -patient underwent uneventful right laparoscopic hand assisted nephrectomy on 10/31/2019with the pathology consistent with squamous cell  carcinoma.  Furthermore,This case was compared to the patient's recent lung biopsy (refer to   case [id number]) and shows similar morphology  -monitor creatinine post nephrectomy for renal dosing of drugs  -currently patient is scheduled for intravenous fluid therapy over the weekend     3) SMOKING CESSATION:  Patient reports quitting of smoking  2 cigarettes a day will try to quit before renal surgery  -patient currently feels that he will be able to discontinue smoking prior to the surgery.  -we will discuss nicotine patch and nicotine supplementation next visit if he is unable to discontinue smoking by then     4) COPD  -the patient reports usage of inhaler  -care per Pulmonary Services which were involved in care currently  -albuterol rescue inhaler p.r.n.  -patient encouraged to use inhalers on ragular basis currently.     5) CORONARY ARTERY DISEASE  -patient on pravastatin, metoprolol, lisinopril and aspirin      6) ACUTE KIDNEY INJURY  -creatinine trending up post surgery  -iv fluids  -patient is borderline hypotensive today and will get iv fluids.  We will hold the chemotherapy today for systolic blood pressure in 80s    7) CONSTIPATION  -patient taking milk of magnesium with good results currently patient does not report any constipation      Plan  -iv fluids 750 ml normal saline  -iv fluids over week end 750 ml normal saline  -follow up on Tuesday to begin c1day 1 of carbotaxol 12/3/19.please get cbc,cmp,mag,phos on Tuesday and patient to see midlevel           FOOTNOTE  Am J Clin Oncol. 2003 Oct;26(5):441-7.   Weekly carboplatin and paclitaxel in elderly non-small-cell lung cancer patients (>or=65 years of age): a phase II [facility] study.  Silvia AZUL, Ginny PJ, Fred S, Abraham JA, Melissa S, Humberto EA, Manjinder MW, Kings S, Tomás M, Mark JR.   Author information  1  [facility], [place], [place], [place] [address], [place].  Abstract  The purpose of this study was to  determine the tumor response rate and toxicity profile of low-dose weekly carboplatin and paclitaxel in advanced non-small-cell lung cancer (SCLC) patients 65 or more years of age. Forty-nine patients 65 years of age or more with advanced non-SCLC with a median age of 73 years (range: 65-85) and an Eastern Cooperative Oncology Group performance status of 0, 1, or 2 in 31%, 47%, and 22% of patients, respectively, were treated and evaluated. Patients received carboplatin (AUC = 2) and paclitaxel 50 mg/m2 on days 1, 8, and 15 of a 4-week cycle. The overall confirmed tumor response rate was 14% (95% CI: 4.7%, 32.5%) with no complete responses. The 1-year survival rate was 31% (95% CI: 20%, 48%). There was one treatment-related death, and there were two grade IV allergic reactions to chemotherapy. No other grade IV or V treatment-related toxicities were observed. There were only three episodes of grade III myelosuppression. Low-dose weekly carboplatin and paclitaxel, as prescribed in this trial, provides modest activity in the treatment of advanced non-SCLC patients 65 or more years of age. However, the relatively mild toxicity profile observed in this trial suggests that this regimen might remain an option for patients at increased risk for myelosuppression or with a poor performance status.  PMID:  77686031  DOI:  [id number]  [Indexed for MEDLINE]         2)J Clin Oncol. 2005 Sep 1;23(25):5899-91. Epub 2005 Aug 8.   Combined chemoradiotherapy regimens of paclitaxel and carboplatin for locally advanced non-small-cell lung cancer: a randomized phase II locally advanced multi-modality protocol.  Buffy LONGORIA1, Dirk H, Haritha P, Brodie C, Archie P, Waleska J, Gonzalo DANIEL Jr.   Author information  1  [facility], [place] [address], [place]. [email or web address]  Erratum in  J Clin Oncol. 2006 Apr 20;24(12):1966.       Lars was seen today for cancer and  chemotherapy.    Diagnoses and all orders for this visit:    Dehydration  -     sodium chloride (NORMAL SALINE) 0.9 % bolus 750 mL    Other orders  -     senna (SENNA LAX) 8.6 MG tablet; Take 1 tablet by mouth at bedtime.  -     heparin 100 UNIT/ML lock flush 500 Units  -     ONCOLOGY PATHWAY DECISION           Aiden Chowdhury MD 11/29/2019

## 2025-01-26 NOTE — HOSPITAL COURSE
"Medications:  Make sure to continue taking your blood thinner apixiban (trade name: Eliquis) after your procedure as you would normally take it.  If given a prescription of furosemide (trade name: Lasix), which is a diuretic (fluid pill), you can take it daily or twice daily as needed for fluid retention or shortness of breath following your ablation  You may experience chest discomfort (also known as "pericarditis") with deep breathes, coughing, and/or laying down which is typically normal following your procedure. If this occurs, you can take ibuprofen (Motrin) 800 mg every 8 hours for 2-3 days. If the chest pain is persistent or severe please visit the nearest emergency department.    Groin site management, precautions, and restrictions:  Remove the bandages over your groin area the morning after your procedure. You can shower after you remove these bandages. Keep the groin sites clean and dry. You do not need to apply ointments or bandages to the area.   If oozing from groin site occurs, apply pressure without letting up for 15 minutes and lay flat for 1 hour. If bleeding has resolved, you can continue to monitor. If the bleeding continues or there is significant swelling or pain in the groin area, please visit the nearest ER for evaluation and treatment. DO NOT STOP TAKING YOUR BLOOD THINNER UNLESS INSTRUCTED BY A PHYSICIAN.   Do not take baths or submerge your groin area or at least 1 week or when the puncture sites in your groin have completely healed  Do not lift anything over 10 lbs for the first week after your procedure, and avoid strenuous activity during this time to allow for the groin sites to heal. After 1 week, there are no activity restrictions.  Please contact the electrophysiology clinic or go to the ER if you experience: severe chest pain, shortness of breath, bleeding or swelling of the groin sites, or any other concerns.   "

## 2025-01-26 NOTE — SUBJECTIVE & OBJECTIVE
Interval History: No acute events overnight. Voiding without hematuria. Ambulating without issues, groin without hematoma.     Review of Systems   Constitutional: Negative for chills, diaphoresis, fever and malaise/fatigue.   HENT:  Negative for nosebleeds.    Eyes:  Negative for blurred vision and double vision.   Cardiovascular:  Negative for chest pain, claudication, cyanosis, dyspnea on exertion, leg swelling, orthopnea, palpitations, paroxysmal nocturnal dyspnea and syncope.   Respiratory:  Negative for cough, shortness of breath and wheezing.    Skin:  Negative for dry skin and poor wound healing.   Musculoskeletal:  Negative for back pain, joint swelling and myalgias.   Gastrointestinal:  Negative for abdominal pain, nausea and vomiting.   Genitourinary:  Negative for hematuria.   Neurological:  Negative for dizziness, headaches, numbness, seizures and weakness.   Psychiatric/Behavioral:  Negative for altered mental status and depression.      Objective:     Vital Signs (Most Recent):  Temp: 98 °F (36.7 °C) (01/26/25 0724)  Pulse: 71 (01/26/25 0724)  Resp: 20 (01/26/25 0724)  BP: (!) 140/84 (01/26/25 0724)  SpO2: 95 % (01/26/25 0724) Vital Signs (24h Range):  Temp:  [96.8 °F (36 °C)-98.4 °F (36.9 °C)] 98 °F (36.7 °C)  Pulse:  [51-71] 71  Resp:  [16-22] 20  SpO2:  [94 %-100 %] 95 %  BP: (103-140)/(50-84) 140/84  Arterial Line BP: (108-131)/(50-64) 131/63     Weight: 95.3 kg (210 lb)  Body mass index is 29.29 kg/m².     SpO2: 95 %        Physical Exam  Constitutional:       General: He is not in acute distress.     Appearance: Normal appearance. He is well-developed. He is not ill-appearing, toxic-appearing or diaphoretic.   HENT:      Head: Normocephalic and atraumatic.   Eyes:      General: No scleral icterus.     Extraocular Movements: Extraocular movements intact.      Conjunctiva/sclera: Conjunctivae normal.      Pupils: Pupils are equal, round, and reactive to light.   Neck:      Thyroid: No thyromegaly.  "     Vascular: No JVD.      Trachea: No tracheal deviation.   Cardiovascular:      Rate and Rhythm: Normal rate and regular rhythm.      Heart sounds: S1 normal and S2 normal. Murmur heard.      Systolic murmur is present with a grade of 2/6.      No friction rub. No gallop.   Pulmonary:      Effort: Pulmonary effort is normal. No respiratory distress.      Breath sounds: No stridor. Rales present. No wheezing or rhonchi.   Chest:      Chest wall: No tenderness.   Abdominal:      General: There is no distension.      Palpations: Abdomen is soft.   Musculoskeletal:         General: No swelling or tenderness. Normal range of motion.      Cervical back: Normal range of motion and neck supple. No rigidity.      Right lower leg: No edema.      Left lower leg: No edema.   Skin:     General: Skin is warm and dry.      Coloration: Skin is not jaundiced.      Findings: No rash.   Neurological:      General: No focal deficit present.      Mental Status: He is alert and oriented to person, place, and time.      Cranial Nerves: No cranial nerve deficit.   Psychiatric:         Mood and Affect: Mood normal.         Behavior: Behavior normal.            Significant Labs: EP: No results for input(s): "NA", "K", "CL", "CO2", "GLU", "BUN", "CREATININE", "CALCIUM", "PROT", "ALBUMIN", "BILITOT", "ALKPHOS", "AST", "ALT", "ANIONGAP", "ESTGFRAFRICA", "EGFRNONAA", "WBC", "HGB", "HCT", "PLT", "INR", "PROTIME" in the last 48 hours., ABG: No results for input(s): "PH", "PCO2", "HCO3", "POCSATURATED", "BE" in the last 48 hours., Blood Culture: No results for input(s): "LABBLOO" in the last 48 hours., BMP: No results for input(s): "GLU", "NA", "K", "CL", "CO2", "BUN", "CREATININE", "CALCIUM", "MG" in the last 48 hours., CMP: No results for input(s): "NA", "K", "CL", "CO2", "GLU", "BUN", "CREATININE", "CALCIUM", "PROT", "ALBUMIN", "BILITOT", "ALKPHOS", "AST", "ALT", "ANIONGAP", "ESTGFRAFRICA", "EGFRNONAA" in the last 48 hours., CBC: No results " "for input(s): "WBC", "HGB", "HCT", "PLT" in the last 48 hours., INR: No results for input(s): "INR", "PROTIME" in the last 48 hours., Lipid Panel No results for input(s): "CHOL", "HDL", "LDLCALC", "TRIG", "CHOLHDL" in the last 48 hours.,   Pathology Results  (Last 10 years)      None        , and Troponin No results for input(s): "TROPONINI" in the last 48 hours.     "

## 2025-01-26 NOTE — ASSESSMENT & PLAN NOTE
S/p CTI/PVI and ablation of presenting flutter.    Continue home flecainide, Metoprolol, and Eliquis

## 2025-01-26 NOTE — PLAN OF CARE
Plan of care reviewed with patient and family. Patient able to verbalize understanding of care. Patient remained free of falls/injuries.  Fall precaution remain in place, nonskid socks on. Patient complained of back pain, PRN meds given. Patient is resting comfortably, with no questions or complaints at this time.  Problem: Adult Inpatient Plan of Care  Goal: Plan of Care Review  Outcome: Progressing  Goal: Patient-Specific Goal (Individualized)  Outcome: Progressing  Goal: Absence of Hospital-Acquired Illness or Injury  Outcome: Progressing  Goal: Optimal Comfort and Wellbeing  Outcome: Progressing  Goal: Readiness for Transition of Care  Outcome: Progressing     Problem: Acute Kidney Injury/Impairment  Goal: Fluid and Electrolyte Balance  Outcome: Progressing  Goal: Improved Oral Intake  Outcome: Progressing  Goal: Effective Renal Function  Outcome: Progressing     Problem: Infection  Goal: Absence of Infection Signs and Symptoms  Outcome: Progressing     Problem: Wound  Goal: Optimal Coping  Outcome: Progressing  Goal: Optimal Functional Ability  Outcome: Progressing  Goal: Absence of Infection Signs and Symptoms  Outcome: Progressing  Goal: Improved Oral Intake  Outcome: Progressing  Goal: Optimal Pain Control and Function  Outcome: Progressing  Goal: Skin Health and Integrity  Outcome: Progressing  Goal: Optimal Wound Healing  Outcome: Progressing     Problem: Fall Injury Risk  Goal: Absence of Fall and Fall-Related Injury  Outcome: Progressing     Problem: Pain Acute  Goal: Optimal Pain Control and Function  Outcome: Progressing

## 2025-01-26 NOTE — PLAN OF CARE
Problem: Adult Inpatient Plan of Care  Goal: Plan of Care Review  Outcome: Progressing  Goal: Patient-Specific Goal (Individualized)  Outcome: Progressing  Goal: Absence of Hospital-Acquired Illness or Injury  Outcome: Progressing  Goal: Optimal Comfort and Wellbeing  Outcome: Progressing  Goal: Readiness for Transition of Care  Outcome: Progressing     Problem: Acute Kidney Injury/Impairment  Goal: Fluid and Electrolyte Balance  Outcome: Progressing  Goal: Improved Oral Intake  Outcome: Progressing  Goal: Effective Renal Function  Outcome: Progressing     Problem: Infection  Goal: Absence of Infection Signs and Symptoms  Outcome: Progressing     Problem: Wound  Goal: Optimal Coping  Outcome: Progressing  Goal: Optimal Functional Ability  Outcome: Progressing  Goal: Absence of Infection Signs and Symptoms  Outcome: Progressing  Goal: Improved Oral Intake  Outcome: Progressing  Goal: Optimal Pain Control and Function  Outcome: Progressing  Goal: Skin Health and Integrity  Outcome: Progressing  Goal: Optimal Wound Healing  Outcome: Progressing     Problem: Fall Injury Risk  Goal: Absence of Fall and Fall-Related Injury  Outcome: Progressing

## 2025-01-26 NOTE — PLAN OF CARE
[facility] - Cardiology Step[facility]  Discharge Final Note    Primary Care Provider: Hang Membreno MD    Expected Discharge Date: 1/26/2025    Final Discharge Note (most recent)       Final Note - 01/26/25 1010          Final Note    Assessment Type Final Discharge Note (P)      Anticipated Discharge Disposition Home or Self Care (P)      What phone number can be called within the next 1-3 days to see how you are doing after discharge? [phone] (P)      Hospital Resources/Appts/Education Provided Provided patient/caregiver with written discharge plan information;Provided education on problems/symptoms using teachback;Appointments scheduled and added to AVS (P)         Post-Acute Status    Post-Acute Authorization Other (P)      Other Status No Post-Acute Service Needs (P)      Discharge Delays None known at this time (P)                      Important Message from Medicare             SW noting pt's discharge orders. After review of pt's medical record, no discharge needs identified at this time.     Michelle Gray LMSW

## 2025-01-26 NOTE — DISCHARGE SUMMARY
"[facility] - Cardiology Stepdown  Cardiac Electrophysiology  Discharge Summary      Patient Name: Nader Clarke  MRN: [id number]  Admission Date: 1/25/2025  Hospital Length of Stay: 0 days  Discharge Date and Time:  01/26/2025 9:20 AM  Attending Physician: Teo Giles MD    Discharging Provider: Annabelle Fontaine MD  Primary Care Physician: Hang Membreno MD    HPI:     74 year old gentleman with PMH of atrial fibrillation, HTN, COPD, CKD, chronic HBV, hepatocellular carcinoma who presents to [facility] [place] atrial fibrillation ablation with Dr. Giles. History of paroxysmal AF and AFL recurrent following cardioversion. He was offered PVI/CTI ablation by Dr. Giles and agreed to proceed.         Presenting EKG: Atrial flutter  CHADS-VASc: 2 (age, HTN)   Anticoagulants: Apixaban 5 mg BID   Antiarrhythmics: Flecainide 100 mg BID   LV EF: 55-60% (MARILYNN 11/2024)   Pertinent labs: Hgb 12.2, INR 1.1, Cr 2.1     Procedure(s) (LRB):  Ablation atrial fibrillation (N/A)  Ablation, Atrial Flutter, Typical (N/A)  Transesophageal echo (MARILYNN) intra-procedure log documentation (N/A)     Indwelling Lines/Drains at time of discharge:  Lines/Drains/Airways       None                   Hospital Course:  Medications:  Make sure to continue taking your blood thinner apixiban (trade name: Eliquis) after your procedure as you would normally take it.  If given a prescription of furosemide (trade name: Lasix), which is a diuretic (fluid pill), you can take it daily or twice daily as needed for fluid retention or shortness of breath following your ablation  You may experience chest discomfort (also known as "pericarditis") with deep breathes, coughing, and/or laying down which is typically normal following your procedure. If this occurs, you can take ibuprofen (Motrin) 800 mg every 8 hours for 2-3 days. If the chest pain is persistent or severe please visit the nearest emergency department.    Groin site management, precautions, and " restrictions:  Remove the bandages over your groin area the morning after your procedure. You can shower after you remove these bandages. Keep the groin sites clean and dry. You do not need to apply ointments or bandages to the area.   If oozing from groin site occurs, apply pressure without letting up for 15 minutes and lay flat for 1 hour. If bleeding has resolved, you can continue to monitor. If the bleeding continues or there is significant swelling or pain in the groin area, please visit the nearest ER for evaluation and treatment. DO NOT STOP TAKING YOUR BLOOD THINNER UNLESS INSTRUCTED BY A PHYSICIAN.   Do not take baths or submerge your groin area or at least 1 week or when the puncture sites in your groin have completely healed  Do not lift anything over 10 lbs for the first week after your procedure, and avoid strenuous activity during this time to allow for the groin sites to heal. After 1 week, there are no activity restrictions.  Please contact the electrophysiology clinic or go to the ER if you experience: severe chest pain, shortness of breath, bleeding or swelling of the groin sites, or any other concerns.     Goals of Care Treatment Preferences:  Code Status: DNR      Interval History: No acute events overnight. Voiding without hematuria. Ambulating without issues, groin without hematoma.     Review of Systems   Constitutional: Negative for chills, diaphoresis, fever and malaise/fatigue.   HENT:  Negative for nosebleeds.    Eyes:  Negative for blurred vision and double vision.   Cardiovascular:  Negative for chest pain, claudication, cyanosis, dyspnea on exertion, leg swelling, orthopnea, palpitations, paroxysmal nocturnal dyspnea and syncope.   Respiratory:  Negative for cough, shortness of breath and wheezing.    Skin:  Negative for dry skin and poor wound healing.   Musculoskeletal:  Negative for back pain, joint swelling and myalgias.   Gastrointestinal:  Negative for abdominal pain, nausea and  vomiting.   Genitourinary:  Negative for hematuria.   Neurological:  Negative for dizziness, headaches, numbness, seizures and weakness.   Psychiatric/Behavioral:  Negative for altered mental status and depression.      Objective:     Vital Signs (Most Recent):  Temp: 98 °F (36.7 °C) (01/26/25 0724)  Pulse: 71 (01/26/25 0724)  Resp: 20 (01/26/25 0724)  BP: (!) 140/84 (01/26/25 0724)  SpO2: 95 % (01/26/25 0724) Vital Signs (24h Range):  Temp:  [96.8 °F (36 °C)-98.4 °F (36.9 °C)] 98 °F (36.7 °C)  Pulse:  [51-71] 71  Resp:  [16-22] 20  SpO2:  [94 %-100 %] 95 %  BP: (103-140)/(50-84) 140/84  Arterial Line BP: (108-131)/(50-64) 131/63     Weight: 95.3 kg (210 lb)  Body mass index is 29.29 kg/m².     SpO2: 95 %        Physical Exam  Constitutional:       General: He is not in acute distress.     Appearance: Normal appearance. He is well-developed. He is not ill-appearing, toxic-appearing or diaphoretic.   HENT:      Head: Normocephalic and atraumatic.   Eyes:      General: No scleral icterus.     Extraocular Movements: Extraocular movements intact.      Conjunctiva/sclera: Conjunctivae normal.      Pupils: Pupils are equal, round, and reactive to light.   Neck:      Thyroid: No thyromegaly.      Vascular: No JVD.      Trachea: No tracheal deviation.   Cardiovascular:      Rate and Rhythm: Normal rate and regular rhythm.      Heart sounds: S1 normal and S2 normal. Murmur heard.      Systolic murmur is present with a grade of 2/6.      No friction rub. No gallop.   Pulmonary:      Effort: Pulmonary effort is normal. No respiratory distress.      Breath sounds: No stridor. Rales present. No wheezing or rhonchi.   Chest:      Chest wall: No tenderness.   Abdominal:      General: There is no distension.      Palpations: Abdomen is soft.   Musculoskeletal:         General: No swelling or tenderness. Normal range of motion.      Cervical back: Normal range of motion and neck supple. No rigidity.      Right lower leg: No edema.  "     Left lower leg: No edema.   Skin:     General: Skin is warm and dry.      Coloration: Skin is not jaundiced.      Findings: No rash.   Neurological:      General: No focal deficit present.      Mental Status: He is alert and oriented to person, place, and time.      Cranial Nerves: No cranial nerve deficit.   Psychiatric:         Mood and Affect: Mood normal.         Behavior: Behavior normal.            Significant Labs: EP: No results for input(s): "NA", "K", "CL", "CO2", "GLU", "BUN", "CREATININE", "CALCIUM", "PROT", "ALBUMIN", "BILITOT", "ALKPHOS", "AST", "ALT", "ANIONGAP", "ESTGFRAFRICA", "EGFRNONAA", "WBC", "HGB", "HCT", "PLT", "INR", "PROTIME" in the last 48 hours., ABG: No results for input(s): "PH", "PCO2", "HCO3", "POCSATURATED", "BE" in the last 48 hours., Blood Culture: No results for input(s): "LABBLOO" in the last 48 hours., BMP: No results for input(s): "GLU", "NA", "K", "CL", "CO2", "BUN", "CREATININE", "CALCIUM", "MG" in the last 48 hours., CMP: No results for input(s): "NA", "K", "CL", "CO2", "GLU", "BUN", "CREATININE", "CALCIUM", "PROT", "ALBUMIN", "BILITOT", "ALKPHOS", "AST", "ALT", "ANIONGAP", "ESTGFRAFRICA", "EGFRNONAA" in the last 48 hours., CBC: No results for input(s): "WBC", "HGB", "HCT", "PLT" in the last 48 hours., INR: No results for input(s): "INR", "PROTIME" in the last 48 hours., Lipid Panel No results for input(s): "CHOL", "HDL", "LDLCALC", "TRIG", "CHOLHDL" in the last 48 hours.,   Pathology Results  (Last 10 years)      None        , and Troponin No results for input(s): "TROPONINI" in the last 48 hours.    Consults:   Consults (From admission, onward)          Status Ordering Provider     Inpatient consult to Urology  Once        Provider:  (Not yet assigned)    ALEXANDRIA Muñoz            Significant Diagnostic Studies: Labs: BMP: No results for input(s): "GLU", "NA", "K", "CL", "CO2", "BUN", "CREATININE", "CALCIUM", "MG" in the last 48 hours., CMP No results for " "input(s): "NA", "K", "CL", "CO2", "GLU", "BUN", "CREATININE", "CALCIUM", "PROT", "ALBUMIN", "BILITOT", "ALKPHOS", "AST", "ALT", "ANIONGAP", "ESTGFRAFRICA", "EGFRNONAA" in the last 48 hours., CBC No results for input(s): "WBC", "HGB", "HCT", "PLT" in the last 48 hours., INR   Lab Results   Component Value Date    INR 1.1 01/15/2025    INR 1.1 11/27/2024    INR 1.0 01/20/2021   , Lipid Panel   Lab Results   Component Value Date    CHOL 127 10/25/2024    HDL 41 10/25/2024    LDLCALC 66.4 10/25/2024    TRIG 98 10/25/2024    CHOLHDL 32.3 10/25/2024   , Troponin No results for input(s): "TROPONINI" in the last 168 hours., and A1C: No results for input(s): "HGBA1C" in the last 4320 hours.    Pending Diagnostic Studies:       None            Final Active Diagnoses:    Diagnosis Date Noted POA    PRINCIPAL PROBLEM:  Paroxysmal atrial fibrillation [I48.0] 10/24/2024 Yes    Typical atrial flutter [I48.3] 01/25/2025 Yes    Gross hematuria [R31.0] 01/25/2025 No      Problems Resolved During this Admission:     Cardiac/Vascular  * Paroxysmal atrial fibrillation    S/p CTI/PVI and ablation of presenting flutter.    Continue home flecainide, Metoprolol, and Eliquis     Renal/  Gross hematuria  Urology consulted with recommendations for voiding trial and OP follow up    -- Passed voiding trial and hematuria cleared   -- will place OP follow up with urology        Discharged Condition: stable    Disposition:     Follow Up:    Patient Instructions:      Ambulatory referral/consult to Urology   Standing Status: Future   Referral Priority: Routine Referral Type: Consultation   Referral Reason: Specialty Services Required   Requested Specialty: Urology   Number of Visits Requested: 1     Ambulatory referral/consult to Urology   Standing Status: Future   Referral Priority: Routine Referral Type: Consultation   Referral Reason: Specialty Services Required   Requested Specialty: Urology   Number of Visits Requested: 1 "     Medications:  Reconciled Home Medications:      Medication List        ASK your doctor about these medications      allopurinoL 100 MG tablet  Commonly known as: ZYLOPRIM  Take 100 mg by mouth Daily.     apixaban 5 mg Tab  Commonly known as: ELIQUIS  Take 1 tablet (5 mg total) by mouth 2 (two) times daily.     ascorbic Acid 500 mg Cpsr  Commonly known as: VITAMIN C  Take 500 mg by mouth once daily.     cetirizine 5 MG tablet  Commonly known as: ZYRTEC  Take 1 tablet (5 mg total) by mouth once daily. To help w/ allergy/sinus     doxazosin 8 MG Tab  Commonly known as: CARDURA  Take 8 mg by mouth every evening.     entecavir 1 MG Tab  Commonly known as: BARACLUDE  Take 1 mg by mouth once daily.     ergocalciferol 50,000 unit Cap  Commonly known as: ERGOCALCIFEROL  Take 1 capsule (50,000 Units total) by mouth every 7 days.     ferrous sulfate 142 mg (45 mg iron) Tbsr  Commonly known as: SLOW RELEASE IRON  Take 1 tablet by mouth once daily.     flecainide 100 MG Tab  Commonly known as: TAMBOCOR  Take 1 tablet (100 mg total) by mouth 2 (two) times daily.     fluticasone propionate 50 mcg/actuation nasal spray  Commonly known as: FLONASE  2 sprays (100 mcg total) by Each Nostril route every evening. To help w/ allergy/sinus     furosemide 40 MG tablet  Commonly known as: LASIX  Take 1 tablet (40 mg total) by mouth once daily.     guaiFENesin 600 mg 12 hr tablet  Commonly known as: MUCINEX  Take 1 tablet (600 mg total) by mouth 2 (two) times daily. To help clear sinus and chest congestion     hydrALAZINE 50 MG tablet  Commonly known as: APRESOLINE  TAKE 1 TABLET BY MOUTH THREE TIMES A DAY HOLD IF BLOOD PRESSURE IS LESS THAN 130 SYSTOLIC.     metoprolol tartrate 100 MG tablet  Commonly known as: LOPRESSOR  Take 1 tablet (100 mg total) by mouth 2 (two) times a day. For blood pressure and heart rate control     multivitamin with minerals tablet  Take 1 tablet by mouth once daily.     oxyCODONE-acetaminophen 5-325 mg per  tablet  Commonly known as: PERCOCET  Take 1 tablet by mouth every 4 (four) hours as needed.     pantoprazole 40 MG tablet  Commonly known as: PROTONIX  Take 1 tablet by mouth once daily.     Saline NasaL 0.65 % nasal spray  Generic drug: sodium chloride  1 spray by Nasal route as needed for Congestion. To help reduce need for Afrin     VITAMIN B-12 100 MCG tablet  Generic drug: cyanocobalamin  Take 100 mcg by mouth once daily.              Time spent on the discharge of patient: 45 minutes    Annabelle Fontaine MD  Cardiac Electrophysiology  [facility] - Cardiology Stepdown

## 2025-01-28 ENCOUNTER — TELEPHONE (OUTPATIENT)
Dept: UROLOGY | Facility: CLINIC | Age: 75
End: 2025-01-28
Payer: MEDICARE

## 2025-01-28 LAB
POC ACTIVATED CLOTTING TIME K: 135 SEC (ref 74–137)
POC ACTIVATED CLOTTING TIME K: 141 SEC (ref 74–137)
POC ACTIVATED CLOTTING TIME K: 291 SEC (ref 74–137)
POC ACTIVATED CLOTTING TIME K: 331 SEC (ref 74–137)
POC ACTIVATED CLOTTING TIME K: 343 SEC (ref 74–137)
POC ACTIVATED CLOTTING TIME K: 348 SEC (ref 74–137)
POC ACTIVATED CLOTTING TIME K: 377 SEC (ref 74–137)
SAMPLE: ABNORMAL
SAMPLE: NORMAL

## 2025-01-31 ENCOUNTER — OFFICE VISIT (OUTPATIENT)
Dept: UROLOGY | Facility: CLINIC | Age: 75
End: 2025-01-31
Payer: MEDICARE

## 2025-01-31 VITALS — WEIGHT: 215.5 LBS | BODY MASS INDEX: 30.06 KG/M2

## 2025-01-31 DIAGNOSIS — R39.89 SUSPECTED UTI: ICD-10-CM

## 2025-01-31 DIAGNOSIS — R31.0 GROSS HEMATURIA: Primary | ICD-10-CM

## 2025-01-31 DIAGNOSIS — N40.1 BENIGN LOCALIZED PROSTATIC HYPERPLASIA WITH LOWER URINARY TRACT SYMPTOMS (LUTS): ICD-10-CM

## 2025-01-31 DIAGNOSIS — R35.1 NOCTURIA: ICD-10-CM

## 2025-01-31 LAB
BACTERIA #/AREA URNS HPF: ABNORMAL /HPF
BILIRUB UR QL STRIP: NEGATIVE
CLARITY UR: CLEAR
COLOR UR: YELLOW
GLUCOSE UR QL STRIP: NEGATIVE
HGB UR QL STRIP: NEGATIVE
HYALINE CASTS #/AREA URNS LPF: 3 /LPF
KETONES UR QL STRIP: NEGATIVE
LEUKOCYTE ESTERASE UR QL STRIP: ABNORMAL
MICROSCOPIC COMMENT: ABNORMAL
NITRITE UR QL STRIP: NEGATIVE
PH UR STRIP: 7 [PH] (ref 5–8)
PROT UR QL STRIP: ABNORMAL
RBC #/AREA URNS HPF: 7 /HPF (ref 0–4)
SP GR UR STRIP: 1.01 (ref 1–1.03)
SQUAMOUS #/AREA URNS HPF: 0 /HPF
URN SPEC COLLECT METH UR: ABNORMAL
UROBILINOGEN UR STRIP-ACNC: NEGATIVE EU/DL
WBC #/AREA URNS HPF: 84 /HPF (ref 0–5)
WBC CLUMPS URNS QL MICRO: ABNORMAL

## 2025-01-31 PROCEDURE — 99204 OFFICE O/P NEW MOD 45 MIN: CPT | Mod: S$GLB,,, | Performed by: STUDENT IN AN ORGANIZED HEALTH CARE EDUCATION/TRAINING PROGRAM

## 2025-01-31 PROCEDURE — 1159F MED LIST DOCD IN RCRD: CPT | Mod: CPTII,S$GLB,, | Performed by: STUDENT IN AN ORGANIZED HEALTH CARE EDUCATION/TRAINING PROGRAM

## 2025-01-31 PROCEDURE — 4010F ACE/ARB THERAPY RXD/TAKEN: CPT | Mod: CPTII,S$GLB,, | Performed by: STUDENT IN AN ORGANIZED HEALTH CARE EDUCATION/TRAINING PROGRAM

## 2025-01-31 PROCEDURE — G2211 COMPLEX E/M VISIT ADD ON: HCPCS | Mod: S$GLB,,, | Performed by: STUDENT IN AN ORGANIZED HEALTH CARE EDUCATION/TRAINING PROGRAM

## 2025-01-31 PROCEDURE — 99999 PR PBB SHADOW E&M-EST. PATIENT-LVL III: CPT | Mod: PBBFAC,,, | Performed by: STUDENT IN AN ORGANIZED HEALTH CARE EDUCATION/TRAINING PROGRAM

## 2025-01-31 PROCEDURE — 87088 URINE BACTERIA CULTURE: CPT | Performed by: STUDENT IN AN ORGANIZED HEALTH CARE EDUCATION/TRAINING PROGRAM

## 2025-01-31 PROCEDURE — 87086 URINE CULTURE/COLONY COUNT: CPT | Performed by: STUDENT IN AN ORGANIZED HEALTH CARE EDUCATION/TRAINING PROGRAM

## 2025-01-31 PROCEDURE — 3008F BODY MASS INDEX DOCD: CPT | Mod: CPTII,S$GLB,, | Performed by: STUDENT IN AN ORGANIZED HEALTH CARE EDUCATION/TRAINING PROGRAM

## 2025-01-31 PROCEDURE — 81000 URINALYSIS NONAUTO W/SCOPE: CPT | Performed by: STUDENT IN AN ORGANIZED HEALTH CARE EDUCATION/TRAINING PROGRAM

## 2025-01-31 PROCEDURE — 3062F POS MACROALBUMINURIA REV: CPT | Mod: CPTII,S$GLB,, | Performed by: STUDENT IN AN ORGANIZED HEALTH CARE EDUCATION/TRAINING PROGRAM

## 2025-01-31 PROCEDURE — 1160F RVW MEDS BY RX/DR IN RCRD: CPT | Mod: CPTII,S$GLB,, | Performed by: STUDENT IN AN ORGANIZED HEALTH CARE EDUCATION/TRAINING PROGRAM

## 2025-01-31 PROCEDURE — 3288F FALL RISK ASSESSMENT DOCD: CPT | Mod: CPTII,S$GLB,, | Performed by: STUDENT IN AN ORGANIZED HEALTH CARE EDUCATION/TRAINING PROGRAM

## 2025-01-31 PROCEDURE — 3066F NEPHROPATHY DOC TX: CPT | Mod: CPTII,S$GLB,, | Performed by: STUDENT IN AN ORGANIZED HEALTH CARE EDUCATION/TRAINING PROGRAM

## 2025-01-31 PROCEDURE — 1101F PT FALLS ASSESS-DOCD LE1/YR: CPT | Mod: CPTII,S$GLB,, | Performed by: STUDENT IN AN ORGANIZED HEALTH CARE EDUCATION/TRAINING PROGRAM

## 2025-01-31 PROCEDURE — 87186 SC STD MICRODIL/AGAR DIL: CPT | Performed by: STUDENT IN AN ORGANIZED HEALTH CARE EDUCATION/TRAINING PROGRAM

## 2025-01-31 RX ORDER — DOXYCYCLINE HYCLATE 100 MG
100 TABLET ORAL EVERY 12 HOURS
Qty: 20 TABLET | Refills: 0 | Status: SHIPPED | OUTPATIENT
Start: 2025-01-31 | End: 2025-02-02

## 2025-01-31 NOTE — PROGRESS NOTES
Patient ID: Nader Clarke is a 74 y.o. male.    Chief Complaint: Gross hematuria  Referral: Self, Aaareferral  No address on file      HPI    74 y.o.  presents to Urology clinic for evaluation of gross hematuria, painless, which has since resolved. It occurred after cedillo removal from his recent afib ablation. He is on blood thinners for Afib. Notes hx of BPH and has had at least 1 prostate infection in the past. Patient w/o FH of malignancy. He is up several times at night to void, not happy. He is a current smoker.    No hx of stones.     Medically necessary ROS documented in HPI    Past Medical History  Active Ambulatory Problems     Diagnosis Date Noted    Chronic viral hepatitis B without delta agent and without coma 08/25/2015    Vitamin D deficiency 08/25/2015    Primary osteoarthritis of right knee 08/25/2015    Hx of bariatric surgery 01/05/2016    Essential hypertension 03/21/2017    CKD (chronic kidney disease), stage III 06/29/2017    Paroxysmal atrial fibrillation 10/24/2024    Aortic valve stenosis 08/09/2024    Acute CHF 10/24/2024    Tobacco dependence due to cigarettes 10/24/2024    Advance care planning 10/24/2024    Situational disturbance 10/24/2024    Macrocytic anemia 10/24/2024    Chronic rhinosinusitis 10/24/2024    Viral upper respiratory tract infection 10/24/2024    Rhinitis medicamentosa 10/26/2024    MARTA (acute kidney injury) 12/20/2024    Tobacco abuse 04/22/2022    S/P subtotal gastrectomy 08/09/2024    Chronic obstructive pulmonary disease 04/22/2022    Hepatocellular carcinoma 08/09/2024    Typical atrial flutter 01/25/2025    Gross hematuria 01/25/2025     Resolved Ambulatory Problems     Diagnosis Date Noted    No Resolved Ambulatory Problems     Past Medical History:   Diagnosis Date    Arthritis of big toe     Chronic hepatitis B     Gout     Hypertension          Past Surgical History  Past Surgical History:   Procedure Laterality Date    ABLATION OF ARRHYTHMOGENIC FOCUS FOR  ATRIAL FIBRILLATION N/A 1/25/2025    Procedure: Ablation atrial fibrillation;  Surgeon: Teo Giles MD;  Location: [facility] EP LAB;  Service: Cardiology;  Laterality: N/A;  PAF, MARILYNN (Cx if SR), PVI, CTI, RFA, MARCIA, GEN, MB, 3 Prep    ABLATION, ATRIAL FLUTTER, TYPICAL N/A 1/25/2025    Procedure: Ablation, Atrial Flutter, Typical;  Surgeon: Teo Giles MD;  Location: [facility] EP LAB;  Service: Cardiology;  Laterality: N/A;  PAF, MARILYNN (Cx if SR), PVI, CTI, RFA, MARCIA, GEN, MB, 3 Prep    BALLOON SINUPLASTY OF PARANASAL SINUS      CARDIOVERSION N/A 10/24/2024    Procedure: Cardioversion;  Surgeon: Teo Agrawal MD;  Location: [facility] CATH LAB;  Service: Cardiology;  Laterality: N/A;    CARPUL TUNNEL      RIGHT HAND    ECHOCARDIOGRAM,TRANSESOPHAGEAL N/A 12/02/2024    Procedure: Transesophageal echo (MARILYNN) intra-procedure log documentation;  Surgeon: Teo Ware MD;  Location: [facility] EP LAB;  Service: Cardiology;  Laterality: N/A;    ECHOCARDIOGRAM,TRANSESOPHAGEAL N/A 1/25/2025    Procedure: Transesophageal echo (MARILYNN) intra-procedure log documentation;  Surgeon: Shiv De León III, MD;  Location: [facility] EP LAB;  Service: Cardiology;  Laterality: N/A;    NERVE REPAIR      RIGHT ARM    perforated bowel  2013    RIGHT KNEE REPLACEMENT 7/21/2015      TOTAL KNEE ARTHROPLASTY Left 2020    TRANSESOPHAGEAL ECHOCARDIOGRAM WITH POSSIBLE CARDIOVERSION (MARILYNN W/ POSS CARDIOVERSION) N/A 10/24/2024    Procedure: Transesophageal echo (MARILYNN) intra-procedure log documentation;  Surgeon: Teo Agrawal MD;  Location: [facility] CATH LAB;  Service: Cardiology;  Laterality: N/A;    TREATMENT OF CARDIAC ARRHYTHMIA N/A 12/02/2024    Procedure: Cardioversion or Defibrillation;  Surgeon: Jeff Lee MD;  Location: [facility] EP LAB;  Service: Cardiology;  Laterality: N/A;  AF, MARILYNN, DCCV, MAC, MB, 3 Prep       Social History       Medications    Current Outpatient Medications:     allopurinol (ZYLOPRIM) 100 MG tablet, Take 100 mg by  mouth Daily., Disp: , Rfl:     apixaban (ELIQUIS) 5 mg Tab, Take 1 tablet (5 mg total) by mouth 2 (two) times daily., Disp: 180 tablet, Rfl: 3    ascorbic Acid (VITAMIN C) 500 mg CpSR, Take 500 mg by mouth once daily., Disp: , Rfl:     cetirizine (ZYRTEC) 5 MG tablet, Take 1 tablet (5 mg total) by mouth once daily. To help w/ allergy/sinus, Disp: 30 tablet, Rfl: 0    cyanocobalamin (VITAMIN B-12) 100 MCG tablet, Take 100 mcg by mouth once daily., Disp: , Rfl:     doxazosin (CARDURA) 8 MG Tab, Take 8 mg by mouth every evening., Disp: , Rfl: 3    entecavir (BARACLUDE) 1 MG Tab, Take 1 mg by mouth once daily., Disp: , Rfl:     ergocalciferol (ERGOCALCIFEROL) 50,000 unit Cap, Take 1 capsule (50,000 Units total) by mouth every 7 days., Disp: 12 capsule, Rfl: 3    ferrous sulfate (SLOW RELEASE IRON) 142 mg (45 mg iron) TbSR, Take 1 tablet by mouth once daily., Disp: , Rfl:     flecainide (TAMBOCOR) 100 MG Tab, Take 1 tablet (100 mg total) by mouth 2 (two) times daily., Disp: 60 tablet, Rfl: 11    fluticasone propionate (FLONASE) 50 mcg/actuation nasal spray, 2 sprays (100 mcg total) by Each Nostril route every evening. To help w/ allergy/sinus, Disp: 16 g, Rfl: 0    furosemide (LASIX) 40 MG tablet, Take 1 tablet (40 mg total) by mouth once daily., Disp: 90 tablet, Rfl: 3    guaiFENesin (MUCINEX) 600 mg 12 hr tablet, Take 1 tablet (600 mg total) by mouth 2 (two) times daily. To help clear sinus and chest congestion, Disp: 20 tablet, Rfl: 0    hydrALAZINE (APRESOLINE) 50 MG tablet, TAKE 1 TABLET BY MOUTH THREE TIMES A DAY HOLD IF BLOOD PRESSURE IS LESS THAN 130 SYSTOLIC., Disp: , Rfl: 3    metoprolol tartrate (LOPRESSOR) 100 MG tablet, Take 1 tablet (100 mg total) by mouth 2 (two) times a day. For blood pressure and heart rate control, Disp: 180 tablet, Rfl: 3    multivitamin with minerals tablet, Take 1 tablet by mouth once daily., Disp: , Rfl:     oxyCODONE-acetaminophen (PERCOCET) 5-325 mg per tablet, Take 1 tablet by  mouth every 4 (four) hours as needed., Disp: , Rfl:     pantoprazole (PROTONIX) 40 MG tablet, Take 1 tablet by mouth once daily., Disp: , Rfl:     sodium chloride (SALINE NASAL) 0.65 % nasal spray, 1 spray by Nasal route as needed for Congestion. To help reduce need for Afrin, Disp: 60 mL, Rfl: 0    sucralfate (CARAFATE) 1 gram tablet, Take 1 tablet (1 g total) by mouth 4 (four) times daily., Disp: 120 tablet, Rfl: 0    Allergies  Review of patient's allergies indicates:   Allergen Reactions    Augmentin [amoxicillin-pot clavulanate] Hives and Rash         Objective:      Physical Exam  Constitutional:       General: He is not in acute distress.     Appearance: He is well-developed. He is not ill-appearing, toxic-appearing or diaphoretic.   HENT:      Head: Normocephalic and atraumatic.      Mouth/Throat:      Mouth: Mucous membranes are moist.   Eyes:      Conjunctiva/sclera: Conjunctivae normal.   Pulmonary:      Effort: Pulmonary effort is normal. No respiratory distress.   Abdominal:      General: Abdomen is flat. There is no distension.      Palpations: Abdomen is soft. There is no mass.      Tenderness: There is no abdominal tenderness. There is no right CVA tenderness, left CVA tenderness or guarding.   Musculoskeletal:         General: No swelling or deformity.      Cervical back: Neck supple.   Skin:     General: Skin is warm.      Findings: No rash.   Neurological:      Mental Status: He is alert and oriented to person, place, and time.      Gait: Gait normal.   Psychiatric:         Mood and Affect: Mood normal.         Thought Content: Thought content normal.         Judgment: Judgment normal.           Imaging results:  Electronically Signed By: Frank Yarbrough, 1/28/2025 10:38 CST  Narrative    Exam description: [facility] CT ABDOMEN LIVER TRIPLE PHASE W WO CONTRAST  Date of service: 1/28/2025 7:58 CST  Technique: Multiphase multiple contiguous helical CT acquisition of the abdomen obtained without and with  the administration of IV contrast. Coronal and sagittal reformats are provided.  All CT scans at [facility] are performed using dose optimization techniques as appropriate to a performed exam including the following: Automated exposure control, Adjustment of the mA and/or kV according to patient size, and/or Use of iterative reconstruction technique.  DLP: 1982 mGy*cm    Clinical history: 74 years-old Male with Hepatocellular carcinoma, assess treatment response.History of hepatocellular carcinoma - Index lesion 3.3 cm segment 7/8, WD HCC on biopsy. S/P MWA Mar 2021 with stable F/U imaging      Comparison: CT abdomen triple phase 7/31/2024; outside CT abdomen triple phase 2/22/2024 ([facility])      FINDINGS:    Soft tissues: Body wall edema. Surgical changes anterior abdominal wall.    Bones: Multilevel spondylosis.    Lungs: Interval development of a trace right pleural effusion. Motion artifact limits evaluation of the lungs. Increased interstitial reticulations at the lung bases.    Heart: Stable enlargement of the heart. Mitral annular calcifications are noted.    Vessels:  Replaced right hepatic artery arising from the superior mesenteric artery.  The portal vein is patent. Unchanged swirled appearance of the superior mesenteric vein. The relative increased opacification of the splenic and portal veins is identified comparison to the superior mesenteric vein likely representing underlying shunting.    Reflux of contrast is noted within the hepatic veins and IVC consistent with underlying cardiac dysfunction.    Liver: Cirrhotic appearing liver.    The administration of the treated observation within hepatic segment 7 demonstrating 3.2 x 2.2 cm masslike enhancement at its margin (series 3 image [id number]) which increases on portal venous phase likely surrounding perfusional abnormality. Washout is identified on equilibrium phase (series 9 image 30). Overlying capsular  retraction is identified with hyperdensities in the adjacent pancreatic parenchyma compatible with prior position tract and blood products.    Bile Ducts and Gallbladder: Layering sludge/stones. Nonspecific pericholecystic fluid in setting of 3rd spacing. Unchanged extrahepatic bile duct dilatation.    Spleen: Normal size.    Pancreas: Normal contours.    Adrenals: Unchanged nodularity of the jugular glands.    Kidneys and proximal ureters: Undulating contours of both kidneys likely representing underlying regions of cortical scarring and/or fetal lobulations. Scattered renal cysts. Additional subcentimeter cystic appearing lesions are identified, too small characterize, since recess. No suspicious filling defects on excretory phases.    Gastrointestinal Tract: Surgical changes of sleeve gastrectomy.    Peritoneum: Trace perihepatic and perisplenic ascites.    Lymph Nodes: No lymphadenopathy.  Exam End: 01/28/25 08:04    Specimen Collected: 01/28/25 08:07 Last Resulted: 01/28/25 10:38   [facility] [place] [facility]  Result Received: 01/31/25 12:50     Assessment:       1. Gross hematuria    2. Nocturia    3. Suspected UTI    4. Benign localized prostatic hyperplasia with lower urinary tract symptoms (LUTS)        Plan:         Gross hematuria  Discussed possible etiologies of hematuria including urolithiasis, inflammation, medicorenal disease, malignancy, trauma, infection, etc.   CT Urogram discussed  as it is the most sensitive for evaluating the kidneys and the collecting system for potential malignancy in patient's high risk for malignancy.   Ct Urogram recommended as previously obtained CT scan was performed with a liver protocol but due to decreased renal function unable to obtained  Patient will attempt to retrieve [facility] [place] [facility] for personal review of his prostate/  tract  Patient scheduled for cystoscopy    BPH/LUTS  PSA due after concern for infection resolved   cc      Poct UA w/ possible  infection  Urine culture  Doxycyline x 10 days provided     Visit today included increased complexity associated with the care of the episodic problem as above  addressed and managing the longitudinal care of the patient due to the serious and/or complex managed problem(s) RTC for cystoscopy.

## 2025-02-02 LAB — BACTERIA UR CULT: ABNORMAL

## 2025-02-02 RX ORDER — CEPHALEXIN 500 MG/1
500 CAPSULE ORAL EVERY 12 HOURS
Qty: 20 CAPSULE | Refills: 0 | Status: SHIPPED | OUTPATIENT
Start: 2025-02-02 | End: 2025-02-12

## 2025-02-03 ENCOUNTER — TELEPHONE (OUTPATIENT)
Dept: UROLOGY | Facility: CLINIC | Age: 75
End: 2025-02-03
Payer: MEDICARE

## 2025-02-03 NOTE — TELEPHONE ENCOUNTER
----- Message from Andreea Ramirez MD sent at 2/2/2025  8:34 AM CST -----  Please alert pt that new abx was sent for uti. Stop doxycycline

## 2025-02-10 ENCOUNTER — TELEPHONE (OUTPATIENT)
Dept: CARDIOLOGY | Facility: CLINIC | Age: 75
End: 2025-02-10
Payer: MEDICARE

## 2025-02-10 NOTE — TELEPHONE ENCOUNTER
----- Message from Neelima sent at 2/10/2025 11:06 AM CST -----  Regarding: patient call back  Type: Patient Call Back    Who called: self     What is the request in detail: needs a procedure done and has to get a clearance before April 11th I looked in Epic and there isn't any available     Can the clinic reply by [facility]? No     Would the patient rather a call back or a response via [facility]? Call     Best call back number: .[phone]      Additional Information:

## 2025-02-11 ENCOUNTER — TELEPHONE (OUTPATIENT)
Dept: CARDIOLOGY | Facility: CLINIC | Age: 75
End: 2025-02-11
Payer: MEDICARE

## 2025-02-11 NOTE — TELEPHONE ENCOUNTER
Patient states that liver cancer is back and that he needs to have radiation on it and needs cardiac clearance. Patient was scheduled in April 2025 per Tanja's note and he rescheduled his appointment for tomorrow. Patient wants to know if he needs to come in for clearance since he has been seen on 1/8/25 or should he keep his appointment for tomorrow. Please advise

## 2025-02-12 ENCOUNTER — OFFICE VISIT (OUTPATIENT)
Dept: CARDIOLOGY | Facility: CLINIC | Age: 75
End: 2025-02-12
Payer: MEDICARE

## 2025-02-12 ENCOUNTER — TELEPHONE (OUTPATIENT)
Dept: UROLOGY | Facility: CLINIC | Age: 75
End: 2025-02-12
Payer: MEDICARE

## 2025-02-12 VITALS
OXYGEN SATURATION: 94 % | BODY MASS INDEX: 27.78 KG/M2 | HEIGHT: 71 IN | DIASTOLIC BLOOD PRESSURE: 56 MMHG | SYSTOLIC BLOOD PRESSURE: 104 MMHG | HEART RATE: 49 BPM | WEIGHT: 198.44 LBS

## 2025-02-12 DIAGNOSIS — Z01.810 PREOP CARDIOVASCULAR EXAM: ICD-10-CM

## 2025-02-12 DIAGNOSIS — I48.0 PAROXYSMAL ATRIAL FIBRILLATION WITH RVR: Primary | ICD-10-CM

## 2025-02-12 DIAGNOSIS — I10 ESSENTIAL HYPERTENSION: ICD-10-CM

## 2025-02-12 DIAGNOSIS — R06.02 SOB (SHORTNESS OF BREATH): ICD-10-CM

## 2025-02-12 DIAGNOSIS — Z71.89 DNR (DO NOT RESUSCITATE) DISCUSSION: ICD-10-CM

## 2025-02-12 DIAGNOSIS — Z79.899 ENCOUNTER FOR MONITORING FLECAINIDE THERAPY: ICD-10-CM

## 2025-02-12 DIAGNOSIS — Z79.01 CHRONIC ANTICOAGULATION: ICD-10-CM

## 2025-02-12 DIAGNOSIS — R94.39 ABNORMAL NUCLEAR STRESS TEST: ICD-10-CM

## 2025-02-12 DIAGNOSIS — I50.32 CHRONIC HEART FAILURE WITH PRESERVED EJECTION FRACTION: ICD-10-CM

## 2025-02-12 DIAGNOSIS — Z51.81 ENCOUNTER FOR MONITORING FLECAINIDE THERAPY: ICD-10-CM

## 2025-02-12 DIAGNOSIS — I48.3 TYPICAL ATRIAL FLUTTER: ICD-10-CM

## 2025-02-12 DIAGNOSIS — N18.32 STAGE 3B CHRONIC KIDNEY DISEASE: ICD-10-CM

## 2025-02-12 DIAGNOSIS — I25.10 CORONARY ARTERY CALCIFICATION SEEN ON CT SCAN: ICD-10-CM

## 2025-02-12 DIAGNOSIS — I35.0 NONRHEUMATIC AORTIC VALVE STENOSIS: ICD-10-CM

## 2025-02-12 PROCEDURE — 99999 PR PBB SHADOW E&M-EST. PATIENT-LVL V: CPT | Mod: PBBFAC,,, | Performed by: INTERNAL MEDICINE

## 2025-02-12 PROCEDURE — 3066F NEPHROPATHY DOC TX: CPT | Mod: CPTII,S$GLB,, | Performed by: INTERNAL MEDICINE

## 2025-02-12 PROCEDURE — 3008F BODY MASS INDEX DOCD: CPT | Mod: CPTII,S$GLB,, | Performed by: INTERNAL MEDICINE

## 2025-02-12 PROCEDURE — 3078F DIAST BP <80 MM HG: CPT | Mod: CPTII,S$GLB,, | Performed by: INTERNAL MEDICINE

## 2025-02-12 PROCEDURE — 1101F PT FALLS ASSESS-DOCD LE1/YR: CPT | Mod: CPTII,S$GLB,, | Performed by: INTERNAL MEDICINE

## 2025-02-12 PROCEDURE — 1159F MED LIST DOCD IN RCRD: CPT | Mod: CPTII,S$GLB,, | Performed by: INTERNAL MEDICINE

## 2025-02-12 PROCEDURE — 1160F RVW MEDS BY RX/DR IN RCRD: CPT | Mod: CPTII,S$GLB,, | Performed by: INTERNAL MEDICINE

## 2025-02-12 PROCEDURE — 3074F SYST BP LT 130 MM HG: CPT | Mod: CPTII,S$GLB,, | Performed by: INTERNAL MEDICINE

## 2025-02-12 PROCEDURE — G2211 COMPLEX E/M VISIT ADD ON: HCPCS | Mod: S$GLB,,, | Performed by: INTERNAL MEDICINE

## 2025-02-12 PROCEDURE — 3062F POS MACROALBUMINURIA REV: CPT | Mod: CPTII,S$GLB,, | Performed by: INTERNAL MEDICINE

## 2025-02-12 PROCEDURE — 3288F FALL RISK ASSESSMENT DOCD: CPT | Mod: CPTII,S$GLB,, | Performed by: INTERNAL MEDICINE

## 2025-02-12 PROCEDURE — 1126F AMNT PAIN NOTED NONE PRSNT: CPT | Mod: CPTII,S$GLB,, | Performed by: INTERNAL MEDICINE

## 2025-02-12 PROCEDURE — 99215 OFFICE O/P EST HI 40 MIN: CPT | Mod: S$GLB,,, | Performed by: INTERNAL MEDICINE

## 2025-02-12 RX ORDER — METOPROLOL TARTRATE 50 MG/1
50 TABLET ORAL 2 TIMES DAILY
Qty: 180 TABLET | Refills: 3 | Status: SHIPPED | OUTPATIENT
Start: 2025-02-12 | End: 2026-02-12

## 2025-02-12 RX ORDER — FUROSEMIDE 20 MG/1
20 TABLET ORAL DAILY
Qty: 90 TABLET | Refills: 3 | Status: SHIPPED | OUTPATIENT
Start: 2025-02-12

## 2025-02-12 NOTE — PROGRESS NOTES
CARDIOVASCULAR PROGRESS NOTE    REASON FOR CONSULT:   Nader Clarke is a 74 y.o. male who presents for follow up of PAF/FL, AS.    PCP: Haseeb BIGGS: Chan  HISTORY OF PRESENT ILLNESS:   The patient comes in today at his urgent request for preoperative cardiac risk stratification prior to planned radiotherapy for recurrent hepatocellular carcinoma.  He underwent atrial flutter ablation and pulmonary vein isolation on 01/25/2025.  I have discussed the case with Dr. Chaney who recommends uninterrupted anticoagulation for 3 months (through late April 2025).  If absolutely necessary and in invasive procedure was urgent, this could be abbreviated to 6 weeks (through 03/08/2025).  The patient also requires cystoscopy for history of hematuria.  He denies any overt angina, does describe dyspnea on exertion.  He denies any palpitations or syncope.  There has been no PND, orthopnea, melena, and his lower extremity edema has abated.  It appears that his heart rates in the 40s in his blood pressures in the 100s.  I am wondering if he has overtreated at present status post ablation.    CARDIOVASCULAR HISTORY:   PAF/FL on apixaban 5mg bid and flecainide s/p MARILYNN/DCCV 10/24/24   PVI 1/25/25 (Chan)    AS, mild-mod (echo 10/2024)    PAST MEDICAL HISTORY:     Past Medical History:   Diagnosis Date    Arthritis of big toe     RIGHT FOOT BIG TOE    Chronic hepatitis B     Chronic rhinosinusitis     Gout     Hypertension     Vitamin D deficiency        PAST SURGICAL HISTORY:     Past Surgical History:   Procedure Laterality Date    ABLATION OF ARRHYTHMOGENIC FOCUS FOR ATRIAL FIBRILLATION N/A 1/25/2025    Procedure: Ablation atrial fibrillation;  Surgeon: Teo Giles MD;  Location: [facility] EP LAB;  Service: Cardiology;  Laterality: N/A;  PAF, MARILYNN (Cx if SR), PVI, CTI, RFA, MARCIA, GEN, MB, 3 Prep    ABLATION, ATRIAL FLUTTER, TYPICAL N/A 1/25/2025    Procedure: Ablation, Atrial Flutter, Typical;  Surgeon: Teo Giles MD;   Location: [facility] EP LAB;  Service: Cardiology;  Laterality: N/A;  PAF, MARILYNN (Cx if SR), PVI, CTI, RFA, MARCIA, GEN, MB, 3 Prep    BALLOON SINUPLASTY OF PARANASAL SINUS      CARDIOVERSION N/A 10/24/2024    Procedure: Cardioversion;  Surgeon: Teo Agrawal MD;  Location: [facility] CATH LAB;  Service: Cardiology;  Laterality: N/A;    CARPUL TUNNEL      RIGHT HAND    ECHOCARDIOGRAM,TRANSESOPHAGEAL N/A 12/02/2024    Procedure: Transesophageal echo (MARILYNN) intra-procedure log documentation;  Surgeon: Teo Ware MD;  Location: [facility] EP LAB;  Service: Cardiology;  Laterality: N/A;    ECHOCARDIOGRAM,TRANSESOPHAGEAL N/A 1/25/2025    Procedure: Transesophageal echo (MARILYNN) intra-procedure log documentation;  Surgeon: Shiv De León III, MD;  Location: [facility] EP LAB;  Service: Cardiology;  Laterality: N/A;    NERVE REPAIR      RIGHT ARM    perforated bowel  2013    RIGHT KNEE REPLACEMENT 7/21/2015      TOTAL KNEE ARTHROPLASTY Left 2020    TRANSESOPHAGEAL ECHOCARDIOGRAM WITH POSSIBLE CARDIOVERSION (MARILYNN W/ POSS CARDIOVERSION) N/A 10/24/2024    Procedure: Transesophageal echo (AMRILYNN) intra-procedure log documentation;  Surgeon: Teo Agrawal MD;  Location: [facility] CATH LAB;  Service: Cardiology;  Laterality: N/A;    TREATMENT OF CARDIAC ARRHYTHMIA N/A 12/02/2024    Procedure: Cardioversion or Defibrillation;  Surgeon: Jeff Lee MD;  Location: [facility] EP LAB;  Service: Cardiology;  Laterality: N/A;  AF, MARLIYNN, DCCV, MAC, MB, 3 Prep       ALLERGIES AND MEDICATION:     Review of patient's allergies indicates:   Allergen Reactions    Augmentin [amoxicillin-pot clavulanate] Hives and Rash        Medication List            Accurate as of February 12, 2025  1:12 PM. If you have any questions, ask your nurse or doctor.                CONTINUE taking these medications      allopurinoL 100 MG tablet  Commonly known as: ZYLOPRIM     apixaban 5 mg Tab  Commonly known as: ELIQUIS  Take 1 tablet (5 mg total) by mouth 2 (two) times daily.      ascorbic Acid 500 mg Cpsr  Commonly known as: VITAMIN C     cephALEXin 500 MG capsule  Commonly known as: KEFLEX  Take 1 capsule (500 mg total) by mouth every 12 (twelve) hours. Stop if rash or hives develop and notify physician for 10 days     cetirizine 5 MG tablet  Commonly known as: ZYRTEC  Take 1 tablet (5 mg total) by mouth once daily. To help w/ allergy/sinus     doxazosin 8 MG Tab  Commonly known as: CARDURA     entecavir 1 MG Tab  Commonly known as: BARACLUDE     ergocalciferol 50,000 unit Cap  Commonly known as: ERGOCALCIFEROL  Take 1 capsule (50,000 Units total) by mouth every 7 days.     ferrous sulfate 142 mg (45 mg iron) Tbsr  Commonly known as: SLOW RELEASE IRON     flecainide 100 MG Tab  Commonly known as: TAMBOCOR  Take 1 tablet (100 mg total) by mouth 2 (two) times daily.     fluticasone propionate 50 mcg/actuation nasal spray  Commonly known as: FLONASE  2 sprays (100 mcg total) by Each Nostril route every evening. To help w/ allergy/sinus     furosemide 40 MG tablet  Commonly known as: LASIX  Take 1 tablet (40 mg total) by mouth once daily.     guaiFENesin 600 mg 12 hr tablet  Commonly known as: MUCINEX  Take 1 tablet (600 mg total) by mouth 2 (two) times daily. To help clear sinus and chest congestion     hydrALAZINE 50 MG tablet  Commonly known as: APRESOLINE     metoprolol tartrate 100 MG tablet  Commonly known as: LOPRESSOR  Take 1 tablet (100 mg total) by mouth 2 (two) times a day. For blood pressure and heart rate control     multivitamin with minerals tablet     oxyCODONE-acetaminophen 5-325 mg per tablet  Commonly known as: PERCOCET     pantoprazole 40 MG tablet  Commonly known as: PROTONIX     Saline NasaL 0.65 % nasal spray  Generic drug: sodium chloride  1 spray by Nasal route as needed for Congestion. To help reduce need for Afrin     sucralfate 1 gram tablet  Commonly known as: CARAFATE  Take 1 tablet (1 g total) by mouth 4 (four) times daily.     VITAMIN B-12 100 MCG  tablet  Generic drug: cyanocobalamin              SOCIAL HISTORY:     Social History     Socioeconomic History    Marital status:    Tobacco Use    Smoking status: Every Day     Current packs/day: 2.00     Average packs/day: 2.0 packs/day for 1.1 years (2.2 ttl pk-yrs)     Types: Cigars, Cigarettes     Start date: 2024     Last attempt to quit: 7/6/2016    Smokeless tobacco: Current    Tobacco comments:     cigars   Substance and Sexual Activity    Alcohol use: Not Currently     Alcohol/week: 6.0 standard drinks of alcohol     Types: 6 Shots of liquor per week     Comment: weekly    Drug use: No    Sexual activity: Yes     Partners: Female     Social Drivers of Health     Financial Resource Strain: Low Risk  (2/11/2025)    Overall Financial Resource Strain (CARDIA)     Difficulty of Paying Living Expenses: Not very hard   Food Insecurity: No Food Insecurity (2/11/2025)    Hunger Vital Sign     Worried About Running Out of Food in the Last Year: Never true     Ran Out of Food in the Last Year: Never true   Transportation Needs: No Transportation Needs (2/11/2025)    PRAPARE - Transportation     Lack of Transportation (Medical): No     Lack of Transportation (Non-Medical): No   Physical Activity: Insufficiently Active (2/11/2025)    Exercise Vital Sign     Days of Exercise per Week: 1 day     Minutes of Exercise per Session: 10 min   Stress: Patient Declined (2/11/2025)    [employer] [employer] - Occupational Stress Questionnaire     Feeling of Stress : Patient declined   Housing Stability: Low Risk  (2/11/2025)    Housing Stability Vital Sign     Unable to Pay for Housing in the Last Year: No     Number of Times Moved in the Last Year: 0     Homeless in the Last Year: No       FAMILY HISTORY:   No family history on file.    REVIEW OF SYSTEMS:   Review of Systems   Constitutional:  Positive for malaise/fatigue. Negative for chills, diaphoresis and fever.   HENT:  Negative for nosebleeds.   "  Eyes:  Negative for blurred vision, double vision and photophobia.   Respiratory:  Positive for shortness of breath. Negative for hemoptysis and wheezing.    Cardiovascular:  Negative for chest pain, palpitations, orthopnea, claudication, leg swelling and PND.   Gastrointestinal:  Negative for abdominal pain, blood in stool, heartburn, melena, nausea and vomiting.   Genitourinary:  Negative for flank pain and hematuria.   Musculoskeletal:  Negative for falls, myalgias and neck pain.   Skin:  Negative for rash.   Neurological:  Negative for dizziness, seizures, loss of consciousness, weakness and headaches.   Endo/Heme/Allergies:  Negative for polydipsia. Does not bruise/bleed easily.   Psychiatric/Behavioral:  Negative for depression and memory loss. The patient is not nervous/anxious.        PHYSICAL EXAM:     Vitals:    02/12/25 1255   BP: (!) 104/56   Pulse: (!) 49    Body mass index is 27.67 kg/m².  Weight: 90 kg (198 lb 6.6 oz)   Height: 5' 11" (180.3 cm)     Physical Exam  Vitals reviewed.   Constitutional:       General: He is not in acute distress.     Appearance: Normal appearance. He is well-developed. He is not ill-appearing, toxic-appearing or diaphoretic.   HENT:      Head: Normocephalic and atraumatic.   Eyes:      General: No scleral icterus.     Extraocular Movements: Extraocular movements intact.      Conjunctiva/sclera: Conjunctivae normal.      Pupils: Pupils are equal, round, and reactive to light.   Neck:      Thyroid: No thyromegaly.      Vascular: Normal carotid pulses. No JVD.      Trachea: Trachea normal.   Cardiovascular:      Rate and Rhythm: Regular rhythm. Bradycardia present. Occasional Extrasystoles are present.     Heart sounds: S1 normal and S2 normal. Heart sounds are distant. No murmur heard.     No friction rub. No gallop.   Pulmonary:      Effort: Pulmonary effort is normal. No respiratory distress.      Breath sounds: No stridor. No wheezing, rhonchi or rales.   Chest:      " Chest wall: No tenderness.   Abdominal:      General: There is no distension.      Palpations: Abdomen is soft.   Musculoskeletal:         General: No swelling or tenderness. Normal range of motion.      Cervical back: Normal range of motion and neck supple. No edema or rigidity.      Right lower leg: No edema.      Left lower leg: No edema.   Feet:      Right foot:      Skin integrity: No ulcer.      Left foot:      Skin integrity: No ulcer.   Skin:     General: Skin is warm and dry.      Coloration: Skin is not jaundiced.   Neurological:      General: No focal deficit present.      Mental Status: He is alert and oriented to person, place, and time.      Cranial Nerves: No cranial nerve deficit.   Psychiatric:         Mood and Affect: Mood normal.         Speech: Speech normal.         Behavior: Behavior normal. Behavior is cooperative.         DATA:   EKG: (personally reviewed tracing(s))  1/25/25 SR 64, LAD, NSSTTW changes    Laboratory:  CBC:  Recent Labs   Lab 11/27/24  1228 12/04/24  1306 01/15/25  1232   WBC 11.51 12.50 9.50   Hemoglobin 12.0 L 10.7 L 12.2 L   Hematocrit 35.4 L 32.9 L 36.1 L   Platelets 220 264 168       CHEMISTRIES:  Recent Labs   Lab 10/31/24  1315 11/06/24  1213 11/13/24  1326 11/27/24  1228 12/04/24  1306 12/06/24  1215 01/15/25  1232 01/28/25  0826 02/04/25  1521   Glucose 132 H 96 157 H 131 H 153 H 90 79  79  --   --    Sodium 140 140 135 L 137 136 140 138  138 138 140   Potassium 4.4 4.1 3.8 3.9 3.8 3.8 3.6  3.6 4.4 3.6   BUN 32 H 44 H 51 H 39 H 56 H 48 H 37 H  37 H 46.8 H 36.9 H   Creatinine 2.2 H 2.3 H 2.3 H 2.0 H 3.0 H 2.0 H 2.1 H  2.1 H 1.83 H 1.81 H   eGFR 31 A 29 A 29 A 35 A 21 A 35 A 33 A  33 A 39 L 39 L   Calcium 8.6 L 8.8 8.6 L 8.7 8.7 8.7 8.6 L  8.6 L 9.4 9.0   Magnesium 2.3 2.7 H 2.2 2.0 2.1  --   --   --   --        CARDIAC BIOMARKERS:  Recent Labs   Lab 10/24/24  0929 10/25/24  0537   Troponin I 0.027 H 0.036 H       COAGS:  Recent Labs   Lab 11/27/24  1222  01/15/25  1232   INR 1.1 1.1       LIPIDS/LFTS:  Recent Labs   Lab 10/25/24  0452 10/31/24  1315 01/15/25  1232 01/28/25  0826 02/04/25  1521   Cholesterol 127  --   --   --   --    Triglycerides 98  --   --   --   --    HDL 41  --   --   --   --    LDL Cholesterol 66.4  --   --   --   --    Non-HDL Cholesterol 86  --   --   --   --    AST  --    < > 19 27 16   ALT  --    < > 21 27 15    < > = values in this interval not displayed.     Lab Results   Component Value Date    TSH 1.035 10/24/2024         Cardiovascular Testing:  MARILYNN 1/25/25    MARILYNN prior to ablation. No thrombus seen.    Left Atrium: Left atrium is dilated. The left atrial appendage appears normal. The left atrial appendage has a chicken wing morphology. Appendage velocity is reduced at less than 40 cm/sec. The pulmonary veins appear normal with systolic blunting. There is no thrombus in the cavity.    Left Ventricle: The left ventricle is normal in size. Normal wall thickness. There is normal systolic function with a visually estimated ejection fraction of 55 - 60%.    Right Ventricle: Normal right ventricular cavity size. Wall thickness is normal. Systolic function is normal.    Aortic Valve: The aortic valve is a trileaflet valve. There is moderate aortic valve sclerosis. There is annular calcification present. Mildly restricted motion. Unable to accurately assess severity of aortic stenosis but visually appears mild. There is mild to moderate aortic regurgitation.    Mitral Valve: There is moderate mitral annular calcification present. There is mild to moderate regurgitation.    Aorta: Ascending aorta is mildly dilated measuring 4.4 cm.    Pericardium: There is no pericardial effusion.    L MPI 12/19/24 (images prev personally reviewed and interpreted)    Abnormal myocardial perfusion scan.    There is a mild to moderate intensity, small to medium sized, reversible perfusion abnormality that is consistent with ischemia in the mid to apical inferior  wall(s) in the typical distribution of the RCA territory.    There are no other significant perfusion abnormalities.    The gated perfusion images showed an ejection fraction of 43% post stress.    The ECG portion of the study is negative for ischemia.    The patient reported no chest pain during the stress test.    There were no arrhythmias during stress.    Echo: 6/5/24    Mild left ventricular hypertrophy.     Normal left ventricular systolic function.     Left ventricular ejection fraction is estimated at  60-65%.     Severely increased left atrial size.     Severe mitral annular calcification.     Mild mitral valve regurgitation.     Moderate aortic valve calcification.     Velocities compatible with severe aortic stenosis.     Aortic valve mean gradient is 29.7 mmHg.     SASHA by planimetry=1.69cm2.     Mild tricuspid valve regurgitation.      Carotid US 6/5/24  1.   There is mild plaque on the right as detailed above. There is no sonographic evidence of a hemodynamically significant stenosis (less than 50%).   2.   There is antegrade flow in both vertebral arteries.     ASSESSMENT:   # PAF/FL, HR controlled, on apixaban 5mg bid and flecainide (managed by Dr. Giles).  S/p ablation 1/25/25 in SR/SB.  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  MPI 12/2024 mildly abnl.  Cont med rx for now.  # HFpEF, euvolemic but still with MEZA/fatigue despite NSR.  # HTN, controlled, ?overtreated.  # CKD3b    PLAN:   Cont med rx  Cont eliquis 5mg bid  Dec lasix 20mg qd  Dec metoprolol 50mg bid  Stop hydralazine  No prohibitive cardiac contraindication to planned radiation seed placement for hepatocellular carcinoma or cystoscopy and associated anesthesia.  No further preoperative cardiac testing is planned.    Case d/w Dr. Giles ([facility]) who has recommended uninterrupted anticoagulation for 3 months status post PVI (earliest late Apr 2025).  If the procedure is urgent, it can be held after 6 weeks  (earliest at 3/8/25).  I have asked Dr. Giles to comment on when it will be acceptable to hold the patient's Eliquis after his atrial flutter ablation on 01/25/2025.  RTC 1 month (Mar 2025)  Surveillance echo 6 months (Aug 2025)  DNR noted, d/w pt and confirmed.    The above documents medical care services that are part of ongoing care related to this patient's serious/complex condition (Code ). (?CAD/HTN/AS/PAF)        Teo Agrawal MD, FACC

## 2025-02-12 NOTE — LETTER
February 12, 2025        Hang Membreno MD  [address] [place] [address]  [address]  [place] [address]             [facility]  [address] [facility] [address]  Phone: [phone]   Patient: Nader Clarke   MR Number: [id number]   YOB: 1950   Date of Visit: 2/12/2025       Dear Dr. Membreno:    Thank you for referring Nader Clarke to me for evaluation. Attached you will find relevant portions of my assessment and plan of care.    If you have questions, please do not hesitate to call me. I look forward to following Nader Clarke along with you.    Sincerely,      Teo Agrawal MD            CC  No Recipients    Enclosure

## 2025-02-12 NOTE — TELEPHONE ENCOUNTER
----- Message from Neelima sent at 2/12/2025  2:59 PM CST -----  Regarding: patient call back  Type: Patient Call Back    Who called: Self     What is the request in detail: asked for a call back to see if he needs to be off of eliquis for his procedure     Can the clinic reply by [facility]? No     Would the patient rather a call back or a response via [facility]? Call     Best call back number: .[phone]

## 2025-02-19 ENCOUNTER — PROCEDURE VISIT (OUTPATIENT)
Dept: UROLOGY | Facility: CLINIC | Age: 75
End: 2025-02-19
Payer: MEDICARE

## 2025-02-19 VITALS — WEIGHT: 207 LBS | BODY MASS INDEX: 28.87 KG/M2

## 2025-02-19 DIAGNOSIS — R31.0 GROSS HEMATURIA: ICD-10-CM

## 2025-02-19 RX ORDER — SULFAMETHOXAZOLE AND TRIMETHOPRIM 800; 160 MG/1; MG/1
1 TABLET ORAL EVERY 12 HOURS
Qty: 2 TABLET | Refills: 0 | Status: SHIPPED | OUTPATIENT
Start: 2025-02-19 | End: 2025-02-20

## 2025-02-19 NOTE — PROCEDURES
Cystoscopy    Date/Time: 2/19/2025 1:30 PM    Performed by: Andreea Ramirez MD  Authorized by: Andreea Ramirez MD    Consent Done?:  Yes (Written)  Timeout: prior to procedure the correct patient, procedure, and site was verified    Prep: patient was prepped and draped in usual sterile fashion    Local anesthesia used?: Yes    Anesthesia:  Intraurethral instillation  Local anesthetic:  Topical anesthetic  Indications: hematuria    Position:  Supine  Anesthesia:  Intraurethral instillation  Preparation: Patient was prepped and draped in usual sterile fashion    Scope type:  Flexible cystoscope  Urethra normal: Yes    Prostate normal: Yes    Bladder neck normal: Yes    Normal bladder: grade ii trabeculations, cellulues noted.     patient tolerated the procedure well with no immediate complications  Comments:      Discussed bladder findings  Discussed I have not received [place] [facility] [place] [facility], patient will attempt to retreive imaging on CD ROM, if WNL follow up in 6 m    Abx for  ppx

## 2025-03-14 ENCOUNTER — OFFICE VISIT (OUTPATIENT)
Dept: CARDIOLOGY | Facility: CLINIC | Age: 75
End: 2025-03-14
Payer: MEDICARE

## 2025-03-14 VITALS
BODY MASS INDEX: 28.64 KG/M2 | HEIGHT: 71 IN | SYSTOLIC BLOOD PRESSURE: 114 MMHG | HEART RATE: 120 BPM | RESPIRATION RATE: 18 BRPM | WEIGHT: 204.56 LBS | DIASTOLIC BLOOD PRESSURE: 76 MMHG | OXYGEN SATURATION: 94 %

## 2025-03-14 DIAGNOSIS — I48.0 PAROXYSMAL ATRIAL FIBRILLATION: Primary | ICD-10-CM

## 2025-03-14 DIAGNOSIS — Z79.899 ENCOUNTER FOR MONITORING FLECAINIDE THERAPY: ICD-10-CM

## 2025-03-14 DIAGNOSIS — Z71.89 DNR (DO NOT RESUSCITATE) DISCUSSION: ICD-10-CM

## 2025-03-14 DIAGNOSIS — Z51.81 ENCOUNTER FOR MONITORING FLECAINIDE THERAPY: ICD-10-CM

## 2025-03-14 DIAGNOSIS — I10 ESSENTIAL HYPERTENSION: ICD-10-CM

## 2025-03-14 DIAGNOSIS — I35.0 NONRHEUMATIC AORTIC VALVE STENOSIS: ICD-10-CM

## 2025-03-14 DIAGNOSIS — Z79.01 CHRONIC ANTICOAGULATION: ICD-10-CM

## 2025-03-14 DIAGNOSIS — R06.02 SOB (SHORTNESS OF BREATH): ICD-10-CM

## 2025-03-14 DIAGNOSIS — N18.32 STAGE 3B CHRONIC KIDNEY DISEASE: ICD-10-CM

## 2025-03-14 DIAGNOSIS — I50.32 CHRONIC HEART FAILURE WITH PRESERVED EJECTION FRACTION: ICD-10-CM

## 2025-03-14 DIAGNOSIS — I25.10 CORONARY ARTERY CALCIFICATION SEEN ON CT SCAN: ICD-10-CM

## 2025-03-14 DIAGNOSIS — I50.33 ACUTE ON CHRONIC HEART FAILURE WITH PRESERVED EJECTION FRACTION: ICD-10-CM

## 2025-03-14 DIAGNOSIS — Z01.810 PREOP CARDIOVASCULAR EXAM: ICD-10-CM

## 2025-03-14 PROCEDURE — 99999 PR PBB SHADOW E&M-EST. PATIENT-LVL V: CPT | Mod: PBBFAC,,, | Performed by: INTERNAL MEDICINE

## 2025-03-14 RX ORDER — FUROSEMIDE 20 MG/1
20 TABLET ORAL 2 TIMES DAILY
Qty: 180 TABLET | Refills: 3 | Status: SHIPPED | OUTPATIENT
Start: 2025-03-14

## 2025-03-14 RX ORDER — METOPROLOL TARTRATE 75 MG/1
75 TABLET ORAL 2 TIMES DAILY
Qty: 180 TABLET | Refills: 3 | Status: SHIPPED | OUTPATIENT
Start: 2025-03-14 | End: 2026-03-14

## 2025-03-14 NOTE — PROGRESS NOTES
CARDIOVASCULAR PROGRESS NOTE    REASON FOR CONSULT:   Nader Clarke is a 74 y.o. male who presents for follow up of PAF/FL, AS.    PCP: Haseeb BIGGS: Chan  HISTORY OF PRESENT ILLNESS:   The patient returns for follow up.  In the interim since his last office visit, he underwent mapping for planned radiation seed placement for his hepatocellular carcinoma.  He had his Eliquis held for this procedure, and plans to hold it again for the seed placement as planned next week.  He is describing some shortness of breath and tells me he is back in atrial fibrillation.  He is in a flutter with heart rate in the 100-110 beat per minute range.  He denies any angina, or syncope.  There has been no melena, hematuria, or claudication symptoms.  He does have some rales on examination.    CARDIOVASCULAR HISTORY:   PAF/FL on apixaban 5mg bid and flecainide s/p MARILYNN/DCCV 10/24/24   PVI 1/25/25 (Chan)    AS, mild-mod (echo 10/2024)    PAST MEDICAL HISTORY:     Past Medical History:   Diagnosis Date    Arthritis of big toe     RIGHT FOOT BIG TOE    Chronic hepatitis B     Chronic rhinosinusitis     Gout     Hypertension     Vitamin D deficiency        PAST SURGICAL HISTORY:     Past Surgical History:   Procedure Laterality Date    ABLATION OF ARRHYTHMOGENIC FOCUS FOR ATRIAL FIBRILLATION N/A 1/25/2025    Procedure: Ablation atrial fibrillation;  Surgeon: Teo Giles MD;  Location: [facility] EP LAB;  Service: Cardiology;  Laterality: N/A;  PAF, MARILYNN (Cx if SR), PVI, CTI, RFA, MARCIA, GEN, MB, 3 Prep    ABLATION, ATRIAL FLUTTER, TYPICAL N/A 1/25/2025    Procedure: Ablation, Atrial Flutter, Typical;  Surgeon: Teo Giles MD;  Location: [facility] EP LAB;  Service: Cardiology;  Laterality: N/A;  PAF, MARILYNN (Cx if SR), PVI, CTI, RFA, MARCIA, GEN, MB, 3 Prep    BALLOON SINUPLASTY OF PARANASAL SINUS      CARDIOVERSION N/A 10/24/2024    Procedure: Cardioversion;  Surgeon: Teo Agrawal MD;  Location: [facility] CATH LAB;  Service: Cardiology;   Laterality: N/A;    CARPUL TUNNEL      RIGHT HAND    ECHOCARDIOGRAM,TRANSESOPHAGEAL N/A 12/02/2024    Procedure: Transesophageal echo (MARILYNN) intra-procedure log documentation;  Surgeon: Teo Ware MD;  Location: [facility] EP LAB;  Service: Cardiology;  Laterality: N/A;    ECHOCARDIOGRAM,TRANSESOPHAGEAL N/A 1/25/2025    Procedure: Transesophageal echo (MARILYNN) intra-procedure log documentation;  Surgeon: Shiv De León III, MD;  Location: [facility] EP LAB;  Service: Cardiology;  Laterality: N/A;    NERVE REPAIR      RIGHT ARM    perforated bowel  2013    RIGHT KNEE REPLACEMENT 7/21/2015      TOTAL KNEE ARTHROPLASTY Left 2020    TRANSESOPHAGEAL ECHOCARDIOGRAM WITH POSSIBLE CARDIOVERSION (MARILYNN W/ POSS CARDIOVERSION) N/A 10/24/2024    Procedure: Transesophageal echo (MARILYNN) intra-procedure log documentation;  Surgeon: Teo Agrawal MD;  Location: [facility] CATH LAB;  Service: Cardiology;  Laterality: N/A;    TREATMENT OF CARDIAC ARRHYTHMIA N/A 12/02/2024    Procedure: Cardioversion or Defibrillation;  Surgeon: Jeff Lee MD;  Location: [facility] EP LAB;  Service: Cardiology;  Laterality: N/A;  AF, MARILYNN, DCCV, MAC, MB, 3 Prep       ALLERGIES AND MEDICATION:     Review of patient's allergies indicates:   Allergen Reactions    Augmentin [amoxicillin-pot clavulanate] Hives and Rash        Medication List            Accurate as of March 14, 2025  2:55 PM. If you have any questions, ask your nurse or doctor.                CONTINUE taking these medications      allopurinoL 100 MG tablet  Commonly known as: ZYLOPRIM     apixaban 5 mg Tab  Commonly known as: ELIQUIS  Take 1 tablet (5 mg total) by mouth 2 (two) times daily.     ascorbic Acid 500 mg Cpsr  Commonly known as: VITAMIN C     cetirizine 5 MG tablet  Commonly known as: ZYRTEC  Take 1 tablet (5 mg total) by mouth once daily. To help w/ allergy/sinus     doxazosin 8 MG Tab  Commonly known as: CARDURA     entecavir 1 MG Tab  Commonly known as: BARACLUDE     ergocalciferol  50,000 unit Cap  Commonly known as: ERGOCALCIFEROL  Take 1 capsule (50,000 Units total) by mouth every 7 days.     ferrous sulfate 142 mg (45 mg iron) Tbsr  Commonly known as: SLOW RELEASE IRON     flecainide 100 MG Tab  Commonly known as: TAMBOCOR  Take 1 tablet (100 mg total) by mouth 2 (two) times daily.     fluticasone propionate 50 mcg/actuation nasal spray  Commonly known as: FLONASE  2 sprays (100 mcg total) by Each Nostril route every evening. To help w/ allergy/sinus     furosemide 20 MG tablet  Commonly known as: LASIX  Take 1 tablet (20 mg total) by mouth once daily.     guaiFENesin 600 mg 12 hr tablet  Commonly known as: MUCINEX  Take 1 tablet (600 mg total) by mouth 2 (two) times daily. To help clear sinus and chest congestion     metoprolol tartrate 50 MG tablet  Commonly known as: LOPRESSOR  Take 1 tablet (50 mg total) by mouth 2 (two) times a day. For blood pressure and heart rate control     multivitamin with minerals tablet     oxyCODONE-acetaminophen 5-325 mg per tablet  Commonly known as: PERCOCET     pantoprazole 40 MG tablet  Commonly known as: PROTONIX     Saline NasaL 0.65 % nasal spray  Generic drug: sodium chloride  1 spray by Nasal route as needed for Congestion. To help reduce need for Afrin     VITAMIN B-12 100 MCG tablet  Generic drug: cyanocobalamin              SOCIAL HISTORY:     Social History     Socioeconomic History    Marital status:    Tobacco Use    Smoking status: Every Day     Current packs/day: 2.00     Average packs/day: 2.0 packs/day for 1.2 years (2.4 ttl pk-yrs)     Types: Cigars, Cigarettes     Start date: 2024     Last attempt to quit: 7/6/2016    Smokeless tobacco: Current    Tobacco comments:     cigars   Substance and Sexual Activity    Alcohol use: Not Currently     Alcohol/week: 6.0 standard drinks of alcohol     Types: 6 Shots of liquor per week     Comment: weekly    Drug use: No    Sexual activity: Yes     Partners: Female     Social Drivers of Health      Financial Resource Strain: Low Risk  (2/11/2025)    Overall Financial Resource Strain (CARDIA)     Difficulty of Paying Living Expenses: Not very hard   Food Insecurity: No Food Insecurity (2/11/2025)    Hunger Vital Sign     Worried About Running Out of Food in the Last Year: Never true     Ran Out of Food in the Last Year: Never true   Transportation Needs: No Transportation Needs (2/11/2025)    PRAPARE - Transportation     Lack of Transportation (Medical): No     Lack of Transportation (Non-Medical): No   Physical Activity: Insufficiently Active (2/11/2025)    Exercise Vital Sign     Days of Exercise per Week: 1 day     Minutes of Exercise per Session: 10 min   Stress: Patient Declined (2/11/2025)    [employer] [place] [employer] - Occupational Stress Questionnaire     Feeling of Stress : Patient declined   Housing Stability: Low Risk  (2/11/2025)    Housing Stability Vital Sign     Unable to Pay for Housing in the Last Year: No     Number of Times Moved in the Last Year: 0     Homeless in the Last Year: No       FAMILY HISTORY:   No family history on file.    REVIEW OF SYSTEMS:   Review of Systems   Constitutional:  Positive for malaise/fatigue. Negative for chills, diaphoresis and fever.   HENT:  Negative for nosebleeds.    Eyes:  Negative for blurred vision, double vision and photophobia.   Respiratory:  Positive for shortness of breath. Negative for hemoptysis and wheezing.    Cardiovascular:  Negative for chest pain, palpitations, orthopnea, claudication, leg swelling and PND.   Gastrointestinal:  Negative for abdominal pain, blood in stool, heartburn, melena, nausea and vomiting.   Genitourinary:  Negative for flank pain and hematuria.   Musculoskeletal:  Negative for falls, myalgias and neck pain.   Skin:  Negative for rash.   Neurological:  Negative for dizziness, seizures, loss of consciousness, weakness and headaches.   Endo/Heme/Allergies:  Negative for polydipsia. Does not bruise/bleed  "easily.   Psychiatric/Behavioral:  Negative for depression and memory loss. The patient is not nervous/anxious.        PHYSICAL EXAM:     Vitals:    03/14/25 1435   BP: 114/76   Pulse: (!) 120   Resp: 18    Body mass index is 28.53 kg/m².  Weight: 92.8 kg (204 lb 9.4 oz)   Height: 5' 11" (180.3 cm)     Physical Exam  Vitals reviewed.   Constitutional:       General: He is not in acute distress.     Appearance: Normal appearance. He is well-developed. He is not ill-appearing, toxic-appearing or diaphoretic.   HENT:      Head: Normocephalic and atraumatic.   Eyes:      General: No scleral icterus.     Extraocular Movements: Extraocular movements intact.      Conjunctiva/sclera: Conjunctivae normal.      Pupils: Pupils are equal, round, and reactive to light.   Neck:      Thyroid: No thyromegaly.      Vascular: Normal carotid pulses. No JVD.      Trachea: Trachea normal.   Cardiovascular:      Rate and Rhythm: Regular rhythm. Tachycardia present.      Heart sounds: S1 normal and S2 normal. Heart sounds are distant. No murmur heard.     No friction rub. No gallop.   Pulmonary:      Effort: Pulmonary effort is normal. No respiratory distress.      Breath sounds: No stridor. Rales present. No wheezing or rhonchi.   Chest:      Chest wall: No tenderness.   Abdominal:      General: There is no distension.      Palpations: Abdomen is soft.   Musculoskeletal:         General: No swelling or tenderness. Normal range of motion.      Cervical back: Normal range of motion and neck supple. No edema or rigidity.      Right lower leg: No edema.      Left lower leg: No edema.   Feet:      Right foot:      Skin integrity: No ulcer.      Left foot:      Skin integrity: No ulcer.   Skin:     General: Skin is warm and dry.      Coloration: Skin is not jaundiced.   Neurological:      General: No focal deficit present.      Mental Status: He is alert and oriented to person, place, and time.      Cranial Nerves: No cranial nerve deficit. "   Psychiatric:         Mood and Affect: Mood normal.         Speech: Speech normal.         Behavior: Behavior normal. Behavior is cooperative.         DATA:   EKG: (personally reviewed tracing(s))  1/25/25 SR 64, LAD, NSSTTW changes  3/14/25 ?, LAD/PRWP, , QTc 475    Laboratory:  CBC:  Recent Labs   Lab 11/27/24  1228 12/04/24  1306 01/15/25  1232 03/12/25  0633   WBC 11.51 12.50 9.50 10.0   Hemoglobin 12.0 L 10.7 L 12.2 L 13.1 L   Hematocrit 35.4 L 32.9 L 36.1 L 38.3 L   Platelets 220 264 168  --        CHEMISTRIES:  Recent Labs   Lab 10/31/24  1315 11/06/24  1213 11/13/24  1326 11/27/24  1228 12/04/24  1306 12/06/24  1215 01/15/25  1232 01/28/25  0826 02/04/25  1521 03/12/25  0633   Glucose 132 H 96 157 H 131 H 153 H 90 79  79  --   --   --    Sodium 140 140 135 L 137 136 140 138  138 138 140 140   Potassium 4.4 4.1 3.8 3.9 3.8 3.8 3.6  3.6 4.4 3.6 3.7   BUN 32 H 44 H 51 H 39 H 56 H 48 H 37 H  37 H 46.8 H 36.9 H 55.0 H   Creatinine 2.2 H 2.3 H 2.3 H 2.0 H 3.0 H 2.0 H 2.1 H  2.1 H 1.83 H 1.81 H 1.97 H   eGFR 31 A 29 A 29 A 35 A 21 A 35 A 33 A  33 A 39 L 39 L 35 L   Calcium 8.6 L 8.8 8.6 L 8.7 8.7 8.7 8.6 L  8.6 L 9.4 9.0 9.5   Magnesium 2.3 2.7 H 2.2 2.0 2.1  --   --   --   --   --        CARDIAC BIOMARKERS:  Recent Labs   Lab 10/24/24  0929 10/25/24  0537   Troponin I 0.027 H 0.036 H       COAGS:  Recent Labs   Lab 11/27/24  1228 01/15/25  1232 03/12/25  0633   INR 1.1 1.1 1.1       LIPIDS/LFTS:  Recent Labs   Lab 10/25/24  0452 10/31/24  1315 01/28/25  0826 02/04/25  1521 03/12/25  0633   Cholesterol 127  --   --   --   --    Triglycerides 98  --   --   --   --    HDL 41  --   --   --   --    LDL Cholesterol 66.4  --   --   --   --    Non-HDL Cholesterol 86  --   --   --   --    AST  --    < > 27 16 16   ALT  --    < > 27 15 13    < > = values in this interval not displayed.     Lab Results   Component Value Date    TSH 1.035 10/24/2024         Cardiovascular Testing:  MARILYNN 1/25/25    MARILYNN  prior to ablation. No thrombus seen.    Left Atrium: Left atrium is dilated. The left atrial appendage appears normal. The left atrial appendage has a chicken wing morphology. Appendage velocity is reduced at less than 40 cm/sec. The pulmonary veins appear normal with systolic blunting. There is no thrombus in the cavity.    Left Ventricle: The left ventricle is normal in size. Normal wall thickness. There is normal systolic function with a visually estimated ejection fraction of 55 - 60%.    Right Ventricle: Normal right ventricular cavity size. Wall thickness is normal. Systolic function is normal.    Aortic Valve: The aortic valve is a trileaflet valve. There is moderate aortic valve sclerosis. There is annular calcification present. Mildly restricted motion. Unable to accurately assess severity of aortic stenosis but visually appears mild. There is mild to moderate aortic regurgitation.    Mitral Valve: There is moderate mitral annular calcification present. There is mild to moderate regurgitation.    Aorta: Ascending aorta is mildly dilated measuring 4.4 cm.    Pericardium: There is no pericardial effusion.    L MPI 12/19/24 (images prev personally reviewed and interpreted)    Abnormal myocardial perfusion scan.    There is a mild to moderate intensity, small to medium sized, reversible perfusion abnormality that is consistent with ischemia in the mid to apical inferior wall(s) in the typical distribution of the RCA territory.    There are no other significant perfusion abnormalities.    The gated perfusion images showed an ejection fraction of 43% post stress.    The ECG portion of the study is negative for ischemia.    The patient reported no chest pain during the stress test.    There were no arrhythmias during stress.    Echo: 6/5/24    Mild left ventricular hypertrophy.     Normal left ventricular systolic function.     Left ventricular ejection fraction is estimated at  60-65%.     Severely increased left  atrial size.     Severe mitral annular calcification.     Mild mitral valve regurgitation.     Moderate aortic valve calcification.     Velocities compatible with severe aortic stenosis.     Aortic valve mean gradient is 29.7 mmHg.     SASHA by planimetry=1.69cm2.     Mild tricuspid valve regurgitation.      Carotid US 6/5/24  1.   There is mild plaque on the right as detailed above. There is no sonographic evidence of a hemodynamically significant stenosis (less than 50%).   2.   There is antegrade flow in both vertebral arteries.     ASSESSMENT:   # PAF/FL, HR controlled, on apixaban 5mg bid and flecainide (managed by Dr. Giles).  S/p ablation 1/25/25, back in [facility] with inc VR and mild CHF sxs.  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  MPI 12/2024 mildly abnl.  Cont med rx for now.  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  No further preoperative cardiac testing is planned.  Okay to hold Eliquis for up to 3 days prior to procedure and resume it as soon as possible thereafter.  Message sent to Dr. Giles regarding possible redo atrial flutter ablation versus alternative antiarrhythmic.  RTC 3 months (June 2025)  Surveillance echo 6 months (Aug 2025)  DNR noted, d/w pt and confirmed.  Hope to avoid acute hospitalization.    The above documents medical care services that are part of ongoing care related to this patient's serious/complex condition (Code ). (?CAD/HTN/AS/PAF)        Teo Agrawal MD, [facility]

## 2025-03-14 NOTE — LETTER
March 14, 2025        Hang Membreno MD  [address] [place] [address]  [address]  [place] [id number]             [facility] - Cardiology  [address]  [address]  [place] [address]  Phone: [phone]   Patient: Nader Clarke   MR Number: [id number]   YOB: 1950   Date of Visit: 3/14/2025       Dear Dr. Membreno:    Thank you for referring Nader Clarke to me for evaluation. Attached you will find relevant portions of my assessment and plan of care.    If you have questions, please do not hesitate to call me. I look forward to following Nader Clarke along with you.    Sincerely,      Teo Agrawal MD            CC  No Recipients    Enclosure

## 2025-03-15 LAB
OHS QRS DURATION: 102 MS
OHS QTC CALCULATION: 475 MS

## 2025-03-17 ENCOUNTER — TELEPHONE (OUTPATIENT)
Dept: ELECTROPHYSIOLOGY | Facility: CLINIC | Age: 75
End: 2025-03-17
Payer: MEDICARE

## 2025-03-17 ENCOUNTER — PATIENT MESSAGE (OUTPATIENT)
Dept: CARDIOLOGY | Facility: CLINIC | Age: 75
End: 2025-03-17
Payer: MEDICARE

## 2025-03-18 ENCOUNTER — OFFICE VISIT (OUTPATIENT)
Dept: ELECTROPHYSIOLOGY | Facility: CLINIC | Age: 75
End: 2025-03-18
Payer: MEDICARE

## 2025-03-18 ENCOUNTER — HOSPITAL ENCOUNTER (OUTPATIENT)
Dept: CARDIOLOGY | Facility: CLINIC | Age: 75
Discharge: HOME | End: 2025-03-18
Payer: MEDICARE

## 2025-03-18 VITALS
HEIGHT: 71 IN | SYSTOLIC BLOOD PRESSURE: 99 MMHG | DIASTOLIC BLOOD PRESSURE: 67 MMHG | HEART RATE: 110 BPM | WEIGHT: 200 LBS | BODY MASS INDEX: 28 KG/M2

## 2025-03-18 DIAGNOSIS — I48.4 ATYPICAL ATRIAL FLUTTER: Primary | ICD-10-CM

## 2025-03-18 DIAGNOSIS — I48.0 PAROXYSMAL ATRIAL FIBRILLATION WITH RVR: ICD-10-CM

## 2025-03-18 LAB
OHS QRS DURATION: 98 MS
OHS QTC CALCULATION: 498 MS

## 2025-03-18 PROCEDURE — 1101F PT FALLS ASSESS-DOCD LE1/YR: CPT | Mod: CPTII,S$GLB,, | Performed by: INTERNAL MEDICINE

## 2025-03-18 PROCEDURE — 99214 OFFICE O/P EST MOD 30 MIN: CPT | Mod: S$GLB,,, | Performed by: INTERNAL MEDICINE

## 2025-03-18 PROCEDURE — 93010 ELECTROCARDIOGRAM REPORT: CPT | Mod: S$GLB,,, | Performed by: INTERNAL MEDICINE

## 2025-03-18 PROCEDURE — 3062F POS MACROALBUMINURIA REV: CPT | Mod: CPTII,S$GLB,, | Performed by: INTERNAL MEDICINE

## 2025-03-18 PROCEDURE — 3066F NEPHROPATHY DOC TX: CPT | Mod: CPTII,S$GLB,, | Performed by: INTERNAL MEDICINE

## 2025-03-18 PROCEDURE — 3078F DIAST BP <80 MM HG: CPT | Mod: CPTII,S$GLB,, | Performed by: INTERNAL MEDICINE

## 2025-03-18 PROCEDURE — 1126F AMNT PAIN NOTED NONE PRSNT: CPT | Mod: CPTII,S$GLB,, | Performed by: INTERNAL MEDICINE

## 2025-03-18 PROCEDURE — 3074F SYST BP LT 130 MM HG: CPT | Mod: CPTII,S$GLB,, | Performed by: INTERNAL MEDICINE

## 2025-03-18 PROCEDURE — 1159F MED LIST DOCD IN RCRD: CPT | Mod: CPTII,S$GLB,, | Performed by: INTERNAL MEDICINE

## 2025-03-18 PROCEDURE — 93005 ELECTROCARDIOGRAM TRACING: CPT | Mod: S$GLB,,, | Performed by: INTERNAL MEDICINE

## 2025-03-18 PROCEDURE — 99999 PR PBB SHADOW E&M-EST. PATIENT-LVL III: CPT | Mod: PBBFAC,,, | Performed by: INTERNAL MEDICINE

## 2025-03-18 PROCEDURE — 3008F BODY MASS INDEX DOCD: CPT | Mod: CPTII,S$GLB,, | Performed by: INTERNAL MEDICINE

## 2025-03-18 PROCEDURE — 3288F FALL RISK ASSESSMENT DOCD: CPT | Mod: CPTII,S$GLB,, | Performed by: INTERNAL MEDICINE

## 2025-03-18 NOTE — PROGRESS NOTES
Subjective   Patient ID:  Nader Clarke is a 74 y.o. male who presents for follow-up of Atypical atrial flutter      74 yoM referred for AF management.     11/24: He has AF incidentally discovered and was referred to cardiology. He underwent MARILYNN/CV and was started on eliquis and lopressor. He was normal EF. He was in SR the next day but was back in AF one week later. Today he is in AFL. No syncope or near syncope. Fatigue and MEZA.     Interval history: PVI/CTI 1/25/25. He felt much better in NSR. He had recurrence of arrhythmia in the form of atypical AFL with associated symptoms.     Ablation 1/25/25:  ·  3D mapping performed with Ensite.  ·  CTI ablation.  ·  Intracardiac echo.  ·  Pulmonary vein isolation.    MARILYNN/CV 10/24/24:  Normal biventricular size/fxn, LVEF 55%.  Normal wall motion.  Mod Ca++ AS, SASHA 1.4cm2 by planimetry.  Mod AI.  MAC with mod MR  Mild TR  Biatrial enlargement  No LA/SURAJ thrombus     Successful DCCV AF->NSR 78 BPM 200J x1.    My interpretation of the ECG is:  AFL 2:1    My interpretation of the ECG is:    Past Medical History:  No date: Arthritis of big toe      Comment:  RIGHT FOOT BIG TOE  No date: Chronic hepatitis B  No date: Chronic rhinosinusitis  No date: Gout  No date: Hypertension  No date: Vitamin D deficiency    Past Surgical History:  1/25/2025: ABLATION OF ARRHYTHMOGENIC FOCUS FOR ATRIAL FIBRILLATION;   N/A      Comment:  Procedure: Ablation atrial fibrillation;  Surgeon:                Teo Giles MD;  Location: [facility] EP LAB;                 Service: Cardiology;  Laterality: N/A;  PAF, MARILYNN (Cx if                SR), PVI, CTI, RFA, MARCIA, GEN, MB, 3 Prep  1/25/2025: ABLATION, ATRIAL FLUTTER, TYPICAL; N/A      Comment:  Procedure: Ablation, Atrial Flutter, Typical;  Surgeon:                Teo Giles MD;  Location: [facility] EP LAB;                 Service: Cardiology;  Laterality: N/A;  PAF, MARILYNN (Cx if                SR), PVI, CTI, RFA, MARCIA, GEN, MB, 3 Prep  No  date: BALLOON SINUPLASTY OF PARANASAL SINUS  10/24/2024: CARDIOVERSION; N/A      Comment:  Procedure: Cardioversion;  Surgeon: Teo Agrawal MD;  Location: [facility] CATH LAB;  Service: Cardiology;                 Laterality: N/A;  No date: CARPUL TUNNEL      Comment:  RIGHT HAND  12/02/2024: ECHOCARDIOGRAM,TRANSESOPHAGEAL; N/A      Comment:  Procedure: Transesophageal echo (MARILYNN) intra-procedure                log documentation;  Surgeon: Teo Ware MD;                 Location: [facility] EP LAB;  Service: Cardiology;  Laterality:               N/A;  1/25/2025: ECHOCARDIOGRAM,TRANSESOPHAGEAL; N/A      Comment:  Procedure: Transesophageal echo (MARILYNN) intra-procedure                log documentation;  Surgeon: Shiv De León III, MD;                Location: [facility] EP LAB;  Service: Cardiology;  Laterality:               N/A;  No date: NERVE REPAIR      Comment:  RIGHT ARM  2013: perforated bowel  No date: RIGHT KNEE REPLACEMENT 7/21/2015  2020: TOTAL KNEE ARTHROPLASTY; Left  10/24/2024: TRANSESOPHAGEAL ECHOCARDIOGRAM WITH POSSIBLE   CARDIOVERSION (MARILYNN W/ POSS CARDIOVERSION); N/A      Comment:  Procedure: Transesophageal echo (MARILYNN) intra-procedure                log documentation;  Surgeon: Teo Agrawal MD;                 Location: [facility] CATH LAB;  Service: Cardiology;                 Laterality: N/A;  12/02/2024: TREATMENT OF CARDIAC ARRHYTHMIA; N/A      Comment:  Procedure: Cardioversion or Defibrillation;  Surgeon:                Jeff Lee MD;  Location: [facility] EP LAB;  Service:                Cardiology;  Laterality: N/A;  AF, MARILYNN, DCCV, MAC, MB, 3                Prep    Social History    Socioeconomic History      Marital status:     Tobacco Use      Smoking status: Every Day        Packs/day: 2.00        Years: 2.0 packs/day for 1.2 years (2.4 ttl pk-yrs)        Types: Cigars, Cigarettes        Start date: 2024        Last attempt to quit: 7/6/2016      Smokeless tobacco:  Current      Tobacco comments: cigars    Substance and Sexual Activity      Alcohol use: Not Currently        Alcohol/week: 6.0 standard drinks of alcohol        Types: 6 Shots of liquor per week        Comment: weekly      Drug use: No      Sexual activity: Yes        Partners: Female    Social Drivers of Health  Financial Resource Strain: Low Risk  (2/11/2025)      Overall Financial Resource Strain (CARDIA)          Difficulty of Paying Living Expenses: Not very hard  Food Insecurity: No Food Insecurity (2/11/2025)      Hunger Vital Sign          Worried About Running Out of Food in the Last Year: Never true          Ran Out of Food in the Last Year: Never true  Transportation Needs: No Transportation Needs (2/11/2025)      PRAPARE - Transportation          Lack of Transportation (Medical): No          Lack of Transportation (Non-Medical): No  Physical Activity: Insufficiently Active (2/11/2025)      Exercise Vital Sign          Days of Exercise per Week: 1 day          Minutes of Exercise per Session: 10 min  Stress: Patient Declined (2/11/2025)      [employer] - Occupational Stress Questionnaire          Feeling of Stress : Patient declined  Housing Stability: Low Risk  (2/11/2025)      Housing Stability Vital Sign          Unable to Pay for Housing in the Last Year: No          Number of Times Moved in the Last Year: 0          Homeless in the Last Year: No    No family history on file.      Current Outpatient Medications:  allopurinol (ZYLOPRIM) 100 MG tablet, Take 100 mg by mouth Daily., Disp: , Rfl:   amiodarone (PACERONE) 200 MG Tab, Take 1 tablet (200 mg total) by mouth 2 (two) times daily. Start 3 days after stopping flecainide., Disp: 60 tablet, Rfl: 11  apixaban (ELIQUIS) 5 mg Tab, Take 1 tablet (5 mg total) by mouth 2 (two) times daily., Disp: 180 tablet, Rfl: 3  ascorbic Acid (VITAMIN C) 500 mg CpSR, Take 500 mg by mouth once daily., Disp: , Rfl:   cetirizine (ZYRTEC) 5  MG tablet, Take 1 tablet (5 mg total) by mouth once daily. To help w/ allergy/sinus, Disp: 30 tablet, Rfl: 0  cyanocobalamin (VITAMIN B-12) 100 MCG tablet, Take 100 mcg by mouth once daily., Disp: , Rfl:   doxazosin (CARDURA) 8 MG Tab, Take 8 mg by mouth every evening., Disp: , Rfl: 3  entecavir (BARACLUDE) 1 MG Tab, Take 1 mg by mouth once daily., Disp: , Rfl:   ergocalciferol (ERGOCALCIFEROL) 50,000 unit Cap, Take 1 capsule (50,000 Units total) by mouth every 7 days., Disp: 12 capsule, Rfl: 3  ferrous sulfate (SLOW RELEASE IRON) 142 mg (45 mg iron) TbSR, Take 1 tablet by mouth once daily., Disp: , Rfl:   fluticasone propionate (FLONASE) 50 mcg/actuation nasal spray, 2 sprays (100 mcg total) by Each Nostril route every evening. To help w/ allergy/sinus, Disp: 16 g, Rfl: 0  furosemide (LASIX) 20 MG tablet, Take 1 tablet (20 mg total) by mouth 2 (two) times a day., Disp: 180 tablet, Rfl: 3  guaiFENesin (MUCINEX) 600 mg 12 hr tablet, Take 1 tablet (600 mg total) by mouth 2 (two) times daily. To help clear sinus and chest congestion, Disp: 20 tablet, Rfl: 0  metoprolol tartrate 75 mg Tab, Take 1 tablet (75 mg total) by mouth 2 (two) times a day. For blood pressure and heart rate control, Disp: 180 tablet, Rfl: 3  multivitamin with minerals tablet, Take 1 tablet by mouth once daily., Disp: , Rfl:   oxyCODONE-acetaminophen (PERCOCET) 5-325 mg per tablet, Take 1 tablet by mouth every 4 (four) hours as needed., Disp: , Rfl:   pantoprazole (PROTONIX) 40 MG tablet, Take 1 tablet by mouth once daily., Disp: , Rfl:   sodium chloride (SALINE NASAL) 0.65 % nasal spray, 1 spray by Nasal route as needed for Congestion. To help reduce need for Afrin, Disp: 60 mL, Rfl: 0    No current facility-administered medications for this visit.            Review of Systems   Constitutional: Positive for malaise/fatigue.   HENT: Negative.     Eyes: Negative.    Cardiovascular:  Negative for chest pain, dyspnea on exertion, leg swelling,  near-syncope, palpitations and syncope.   Respiratory: Negative.  Negative for shortness of breath.    Endocrine: Negative.    Hematologic/Lymphatic: Negative.    Skin: Negative.    Musculoskeletal: Negative.    Gastrointestinal: Negative.    Genitourinary: Negative.    Neurological: Negative.  Negative for dizziness and light-headedness.   Psychiatric/Behavioral: Negative.     Allergic/Immunologic: Negative.           Objective     Physical Exam  Vitals reviewed.   Constitutional:       General: He is not in acute distress.     Appearance: He is well-developed.   HENT:      Head: Normocephalic and atraumatic.   Eyes:      Pupils: Pupils are equal, round, and reactive to light.   Neck:      Thyroid: No thyromegaly.      Vascular: No JVD.   Cardiovascular:      Rate and Rhythm: Regular rhythm. Tachycardia present.      Chest Wall: PMI is not displaced.      Heart sounds: Normal heart sounds, S1 normal and S2 normal. No murmur heard.     No friction rub. No gallop.   Pulmonary:      Effort: Pulmonary effort is normal. No respiratory distress.      Breath sounds: Normal breath sounds. No wheezing or rales.   Abdominal:      General: Bowel sounds are normal. There is no distension.      Palpations: Abdomen is soft.      Tenderness: There is no abdominal tenderness. There is no guarding or rebound.   Musculoskeletal:         General: No tenderness. Normal range of motion.      Cervical back: Normal range of motion.   Skin:     General: Skin is warm and dry.      Findings: No erythema or rash.   Neurological:      Mental Status: He is alert and oriented to person, place, and time.      Cranial Nerves: No cranial nerve deficit.   Psychiatric:         Behavior: Behavior normal.         Thought Content: Thought content normal.         Judgment: Judgment normal.            Assessment and Plan     1. Atypical atrial flutter        Plan:  74 yoM atypical flutter after PVI/CTI. I offered repeat ablation. I had extensive  discussion with patient regarding risks and benefits of PVI/WACA, and the patient would like to proceed. Risks of procedure include (but are not limited to) bleeding, stroke, perforation requiring emergency draining or surgery, AV block, death, AV fistula, AE fistula, PV stenosis.    Last anti-coagulation dose night prior to procedure.  Discontinue antiarrhytmic drugs 4 days prior to the procedure.     RF atypical flutter  Anesthesia  MARILYNN prior, cancel if NSR  MARCIA

## 2025-03-20 DIAGNOSIS — I48.0 PAROXYSMAL ATRIAL FIBRILLATION: Primary | ICD-10-CM

## 2025-03-20 DIAGNOSIS — I48.4 ATYPICAL ATRIAL FLUTTER: ICD-10-CM

## 2025-04-11 ENCOUNTER — TELEPHONE (OUTPATIENT)
Dept: NEPHROLOGY | Facility: CLINIC | Age: 75
End: 2025-04-11
Payer: MEDICARE

## 2025-04-11 DIAGNOSIS — N18.32 STAGE 3B CHRONIC KIDNEY DISEASE: Primary | ICD-10-CM

## 2025-04-14 ENCOUNTER — TELEPHONE (OUTPATIENT)
Dept: ELECTROPHYSIOLOGY | Facility: CLINIC | Age: 75
End: 2025-04-14
Payer: MEDICARE

## 2025-04-14 NOTE — TELEPHONE ENCOUNTER
----- Message from Rachel sent at 4/14/2025  2:03 PM CDT -----  Regarding: prodecure  Pls call pt at [phone].  He wants to push his procedure date up because his AFIB is getting worse.Thank you

## 2025-04-14 NOTE — TELEPHONE ENCOUNTER
Spoke with pt, reports he feels he has been staying out of rhythm and his symptoms are worsening, reports b/p 120s systolic, some fatigue, gen weakness, SOB with activity and laying flat, some leg swelling, confirmed he is still taking the amiodarone 200mg BID, reports his eliquis is on hold for a procedure Wednesday, informed pt that we do not have any sooner availability for his ablation at this time but will speak with Dr Giles about possible doing a cardioversion while awaiting ablation, pt understands that the cardioversion may only keep him back in NSR temporarily, will check back with pt on Thursday for an update after his procedure at [facility] on Wednesday

## 2025-04-17 NOTE — TELEPHONE ENCOUNTER
"Pt returned call, reports today he is feeling "ok" symptoms have not totally gone away but have improved, reports hr 114-120s and b/p 113/95, is scheduled for ablation 6/27, he feels he has remained out of rhythm, discussed that we can discuss with Dr Giles the possibility of doing dccv to get him back in a NSR but that we can not guarantee that he will stay in NSR until his ablation, pt reports today he does not feel he needs that at this time, he will f/u with me next week if things change and we will call him if any sooner available date comes up  "

## 2025-05-01 ENCOUNTER — PATIENT MESSAGE (OUTPATIENT)
Dept: ELECTROPHYSIOLOGY | Facility: CLINIC | Age: 75
End: 2025-05-01
Payer: MEDICARE

## 2025-05-02 ENCOUNTER — PATIENT MESSAGE (OUTPATIENT)
Dept: ELECTROPHYSIOLOGY | Facility: CLINIC | Age: 75
End: 2025-05-02
Payer: MEDICARE

## 2025-05-03 ENCOUNTER — LAB VISIT (OUTPATIENT)
Dept: LAB | Facility: HOSPITAL | Age: 75
End: 2025-05-03
Attending: INTERNAL MEDICINE
Payer: MEDICARE

## 2025-05-03 DIAGNOSIS — I48.0 PAROXYSMAL ATRIAL FIBRILLATION: ICD-10-CM

## 2025-05-03 DIAGNOSIS — I48.4 ATYPICAL ATRIAL FLUTTER: ICD-10-CM

## 2025-05-03 LAB
ANION GAP (OHS): 10 MMOL/L (ref 8–16)
APTT PPP: 33.5 SECONDS (ref 21–32)
BUN SERPL-MCNC: 33 MG/DL (ref 8–23)
CALCIUM SERPL-MCNC: 8.4 MG/DL (ref 8.7–10.5)
CHLORIDE SERPL-SCNC: 107 MMOL/L (ref 95–110)
CO2 SERPL-SCNC: 22 MMOL/L (ref 23–29)
CREAT SERPL-MCNC: 2.1 MG/DL (ref 0.5–1.4)
ERYTHROCYTE [DISTWIDTH] IN BLOOD BY AUTOMATED COUNT: 17 % (ref 11.5–14.5)
GFR SERPLBLD CREATININE-BSD FMLA CKD-EPI: 33 ML/MIN/1.73/M2
GLUCOSE SERPL-MCNC: 125 MG/DL (ref 70–110)
HCT VFR BLD AUTO: 36.6 % (ref 40–54)
HGB BLD-MCNC: 12 GM/DL (ref 14–18)
INR PPP: 1.2 (ref 0.8–1.2)
MCH RBC QN AUTO: 33.3 PG (ref 27–31)
MCHC RBC AUTO-ENTMCNC: 32.8 G/DL (ref 32–36)
MCV RBC AUTO: 102 FL (ref 82–98)
PLATELET # BLD AUTO: 144 K/UL (ref 150–450)
PMV BLD AUTO: 10.2 FL (ref 9.2–12.9)
POTASSIUM SERPL-SCNC: 3.7 MMOL/L (ref 3.5–5.1)
PROTHROMBIN TIME: 13.2 SECONDS (ref 9–12.5)
RBC # BLD AUTO: 3.6 M/UL (ref 4.6–6.2)
SODIUM SERPL-SCNC: 139 MMOL/L (ref 136–145)
WBC # BLD AUTO: 8.99 K/UL (ref 3.9–12.7)

## 2025-05-03 PROCEDURE — 80048 BASIC METABOLIC PNL TOTAL CA: CPT

## 2025-05-03 PROCEDURE — 85610 PROTHROMBIN TIME: CPT

## 2025-05-03 PROCEDURE — 36415 COLL VENOUS BLD VENIPUNCTURE: CPT

## 2025-05-03 PROCEDURE — 85027 COMPLETE CBC AUTOMATED: CPT

## 2025-05-03 PROCEDURE — 85730 THROMBOPLASTIN TIME PARTIAL: CPT

## 2025-05-08 ENCOUNTER — TELEPHONE (OUTPATIENT)
Dept: ELECTROPHYSIOLOGY | Facility: CLINIC | Age: 75
End: 2025-05-08
Payer: MEDICARE

## 2025-05-08 ENCOUNTER — ANESTHESIA EVENT (OUTPATIENT)
Dept: MEDSURG UNIT | Facility: HOSPITAL | Age: 75
End: 2025-05-08
Payer: MEDICARE

## 2025-05-08 NOTE — TELEPHONE ENCOUNTER
Spoke to patient    CONFIRMED procedure arrival time of 5:15 AM on 5/9/2025    Reiterated instructions including:  -Directions to check in desk  -NPO after midnight night prior to procedure  -High importance of HOLDING Amiodarone 4 days prior to procedure. Patient confirmed last dose 5/4/25. Patient to hold Metoprolol 2 days prior to procedure. Patient confirmed last dose 5/6/25. Patient to hold Eliquis the morning of the procedure. Patient verbalized understanding.   -Confirmed compliance of Eliquis. Patient to hold Eliquis the morning of the procedure. Patient verbalized understanding.   -Pre-procedure LABS Reviewed.   -Confirmed absence  of implanted device/stimulator   -Confirmed no fever, cough, or shortness of breath in the past 30 days  -Confirmed no redness, rash, irritation, or yeast infection to groin area.   -Reviewed current visitor policy    Patient verbalized understanding of above and appreciated the call.

## 2025-05-09 ENCOUNTER — ANESTHESIA (OUTPATIENT)
Dept: MEDSURG UNIT | Facility: HOSPITAL | Age: 75
End: 2025-05-09
Payer: MEDICARE

## 2025-05-09 ENCOUNTER — HOSPITAL ENCOUNTER (OUTPATIENT)
Facility: HOSPITAL | Age: 75
Discharge: HOME | End: 2025-05-09
Attending: INTERNAL MEDICINE | Admitting: INTERNAL MEDICINE
Payer: MEDICARE

## 2025-05-09 ENCOUNTER — HOSPITAL ENCOUNTER (OUTPATIENT)
Dept: CARDIOLOGY | Facility: HOSPITAL | Age: 75
Discharge: HOME | End: 2025-05-09
Attending: INTERNAL MEDICINE
Payer: MEDICARE

## 2025-05-09 VITALS — BODY MASS INDEX: 27.89 KG/M2 | SYSTOLIC BLOOD PRESSURE: 136 MMHG | WEIGHT: 200 LBS | DIASTOLIC BLOOD PRESSURE: 88 MMHG

## 2025-05-09 VITALS
HEART RATE: 65 BPM | RESPIRATION RATE: 18 BRPM | WEIGHT: 200 LBS | OXYGEN SATURATION: 100 % | SYSTOLIC BLOOD PRESSURE: 131 MMHG | BODY MASS INDEX: 28 KG/M2 | HEIGHT: 71 IN | DIASTOLIC BLOOD PRESSURE: 64 MMHG | TEMPERATURE: 98 F

## 2025-05-09 DIAGNOSIS — I48.92 ATRIAL FLUTTER: ICD-10-CM

## 2025-05-09 DIAGNOSIS — I48.91 AF (ATRIAL FIBRILLATION): ICD-10-CM

## 2025-05-09 DIAGNOSIS — I48.0 PAROXYSMAL ATRIAL FIBRILLATION: ICD-10-CM

## 2025-05-09 DIAGNOSIS — I48.4 ATYPICAL ATRIAL FLUTTER: ICD-10-CM

## 2025-05-09 DIAGNOSIS — I49.9 ARRHYTHMIA: ICD-10-CM

## 2025-05-09 LAB
AORTIC SIZE INDEX: 1.9 CM/M2
ASCENDING AORTA: 4.1 CM
BSA FOR ECHO PROCEDURE: 2.13 M2
OHS QRS DURATION: 102 MS
OHS QRS DURATION: 94 MS
OHS QTC CALCULATION: 489 MS
OHS QTC CALCULATION: 540 MS
SINUS: 2.9 CM
STJ: 2.7 CM

## 2025-05-09 PROCEDURE — C2630 CATH, EP, COOL-TIP: HCPCS | Performed by: INTERNAL MEDICINE

## 2025-05-09 PROCEDURE — 93010 ELECTROCARDIOGRAM REPORT: CPT | Mod: ,,, | Performed by: INTERNAL MEDICINE

## 2025-05-09 PROCEDURE — 93320 DOPPLER ECHO COMPLETE: CPT | Mod: 26,,, | Performed by: INTERNAL MEDICINE

## 2025-05-09 PROCEDURE — 93655 ICAR CATH ABLTJ DSCRT ARRHYT: CPT | Performed by: INTERNAL MEDICINE

## 2025-05-09 PROCEDURE — D9220A PRA ANESTHESIA: Mod: ANES,,, | Performed by: ANESTHESIOLOGY

## 2025-05-09 PROCEDURE — 93662 INTRACARDIAC ECG (ICE): CPT | Performed by: INTERNAL MEDICINE

## 2025-05-09 PROCEDURE — D9220A PRA ANESTHESIA: Mod: CRNA,,, | Performed by: NURSE ANESTHETIST, CERTIFIED REGISTERED

## 2025-05-09 PROCEDURE — 27201037 HC PRESSURE MONITORING SET UP

## 2025-05-09 PROCEDURE — 63600175 PHARM REV CODE 636 W HCPCS: Performed by: NURSE ANESTHETIST, CERTIFIED REGISTERED

## 2025-05-09 PROCEDURE — C1732 CATH, EP, DIAG/ABL, 3D/VECT: HCPCS | Performed by: INTERNAL MEDICINE

## 2025-05-09 PROCEDURE — 25000003 PHARM REV CODE 250: Performed by: STUDENT IN AN ORGANIZED HEALTH CARE EDUCATION/TRAINING PROGRAM

## 2025-05-09 PROCEDURE — 93662 INTRACARDIAC ECG (ICE): CPT | Mod: 26,,, | Performed by: INTERNAL MEDICINE

## 2025-05-09 PROCEDURE — 63600175 PHARM REV CODE 636 W HCPCS: Performed by: STUDENT IN AN ORGANIZED HEALTH CARE EDUCATION/TRAINING PROGRAM

## 2025-05-09 PROCEDURE — 37000008 HC ANESTHESIA 1ST 15 MINUTES: Performed by: INTERNAL MEDICINE

## 2025-05-09 PROCEDURE — C1730 CATH, EP, 19 OR FEW ELECT: HCPCS | Performed by: INTERNAL MEDICINE

## 2025-05-09 PROCEDURE — 93005 ELECTROCARDIOGRAM TRACING: CPT

## 2025-05-09 PROCEDURE — 27201423 OPTIME MED/SURG SUP & DEVICES STERILE SUPPLY: Performed by: INTERNAL MEDICINE

## 2025-05-09 PROCEDURE — 37000009 HC ANESTHESIA EA ADD 15 MINS: Performed by: INTERNAL MEDICINE

## 2025-05-09 PROCEDURE — 36620 INSERTION CATHETER ARTERY: CPT | Mod: 59,,, | Performed by: ANESTHESIOLOGY

## 2025-05-09 PROCEDURE — 93653 COMPRE EP EVAL TX SVT: CPT | Mod: ,,, | Performed by: INTERNAL MEDICINE

## 2025-05-09 PROCEDURE — C1751 CATH, INF, PER/CENT/MIDLINE: HCPCS | Performed by: ANESTHESIOLOGY

## 2025-05-09 PROCEDURE — 93462 L HRT CATH TRNSPTL PUNCTURE: CPT | Performed by: INTERNAL MEDICINE

## 2025-05-09 PROCEDURE — C1766 INTRO/SHEATH,STRBLE,NON-PEEL: HCPCS | Performed by: INTERNAL MEDICINE

## 2025-05-09 PROCEDURE — 63600175 PHARM REV CODE 636 W HCPCS: Performed by: INTERNAL MEDICINE

## 2025-05-09 PROCEDURE — 25000003 PHARM REV CODE 250: Performed by: NURSE ANESTHETIST, CERTIFIED REGISTERED

## 2025-05-09 PROCEDURE — 93655 ICAR CATH ABLTJ DSCRT ARRHYT: CPT | Mod: ,,, | Performed by: INTERNAL MEDICINE

## 2025-05-09 PROCEDURE — 93312 ECHO TRANSESOPHAGEAL: CPT | Mod: 26,,, | Performed by: INTERNAL MEDICINE

## 2025-05-09 PROCEDURE — 93320 DOPPLER ECHO COMPLETE: CPT

## 2025-05-09 PROCEDURE — 93325 DOPPLER ECHO COLOR FLOW MAPG: CPT | Mod: 26,,, | Performed by: INTERNAL MEDICINE

## 2025-05-09 PROCEDURE — C1894 INTRO/SHEATH, NON-LASER: HCPCS | Performed by: INTERNAL MEDICINE

## 2025-05-09 PROCEDURE — C1753 CATH, INTRAVAS ULTRASOUND: HCPCS | Performed by: INTERNAL MEDICINE

## 2025-05-09 PROCEDURE — C1887 CATHETER, GUIDING: HCPCS | Performed by: INTERNAL MEDICINE

## 2025-05-09 PROCEDURE — 93653 COMPRE EP EVAL TX SVT: CPT | Performed by: INTERNAL MEDICINE

## 2025-05-09 PROCEDURE — 93462 L HRT CATH TRNSPTL PUNCTURE: CPT | Mod: ,,, | Performed by: INTERNAL MEDICINE

## 2025-05-09 RX ORDER — FUROSEMIDE 10 MG/ML
40 INJECTION INTRAMUSCULAR; INTRAVENOUS ONCE
Status: COMPLETED | OUTPATIENT
Start: 2025-05-09 | End: 2025-05-09

## 2025-05-09 RX ORDER — LIDOCAINE HYDROCHLORIDE 20 MG/ML
INJECTION, SOLUTION INFILTRATION; PERINEURAL
Status: DISCONTINUED | OUTPATIENT
Start: 2025-05-09 | End: 2025-05-09 | Stop reason: HOSPADM

## 2025-05-09 RX ORDER — HEPARIN SOD,PORCINE/0.9 % NACL 1000/500ML
INTRAVENOUS SOLUTION INTRAVENOUS
Status: DISCONTINUED | OUTPATIENT
Start: 2025-05-09 | End: 2025-05-09 | Stop reason: HOSPADM

## 2025-05-09 RX ORDER — FENTANYL CITRATE 50 UG/ML
INJECTION, SOLUTION INTRAMUSCULAR; INTRAVENOUS
Status: DISCONTINUED | OUTPATIENT
Start: 2025-05-09 | End: 2025-05-09

## 2025-05-09 RX ORDER — OMEPRAZOLE 40 MG/1
40 CAPSULE, DELAYED RELEASE ORAL DAILY
Qty: 90 CAPSULE | Refills: 3 | Status: SHIPPED | OUTPATIENT
Start: 2025-05-09 | End: 2026-05-09

## 2025-05-09 RX ORDER — ROCURONIUM BROMIDE 10 MG/ML
INJECTION, SOLUTION INTRAVENOUS
Status: DISCONTINUED | OUTPATIENT
Start: 2025-05-09 | End: 2025-05-09

## 2025-05-09 RX ORDER — GLUCAGON 1 MG
1 KIT INJECTION
Status: DISCONTINUED | OUTPATIENT
Start: 2025-05-09 | End: 2025-05-09 | Stop reason: HOSPADM

## 2025-05-09 RX ORDER — HEPARIN SODIUM 1000 [USP'U]/ML
INJECTION, SOLUTION INTRAVENOUS; SUBCUTANEOUS
Status: DISCONTINUED | OUTPATIENT
Start: 2025-05-09 | End: 2025-05-09

## 2025-05-09 RX ORDER — ONDANSETRON HYDROCHLORIDE 2 MG/ML
4 INJECTION, SOLUTION INTRAVENOUS DAILY PRN
Status: DISCONTINUED | OUTPATIENT
Start: 2025-05-09 | End: 2025-05-09 | Stop reason: HOSPADM

## 2025-05-09 RX ORDER — OMEPRAZOLE 40 MG/1
40 CAPSULE, DELAYED RELEASE ORAL DAILY
COMMUNITY

## 2025-05-09 RX ORDER — PHENYLEPHRINE HYDROCHLORIDE 10 MG/ML
INJECTION INTRAVENOUS
Status: DISCONTINUED | OUTPATIENT
Start: 2025-05-09 | End: 2025-05-09

## 2025-05-09 RX ORDER — VASOPRESSIN 20 [USP'U]/ML
INJECTION, SOLUTION INTRAMUSCULAR; SUBCUTANEOUS
Status: DISCONTINUED | OUTPATIENT
Start: 2025-05-09 | End: 2025-05-09

## 2025-05-09 RX ORDER — ONDANSETRON HYDROCHLORIDE 2 MG/ML
INJECTION, SOLUTION INTRAVENOUS
Status: DISCONTINUED | OUTPATIENT
Start: 2025-05-09 | End: 2025-05-09

## 2025-05-09 RX ORDER — MIDAZOLAM HYDROCHLORIDE 1 MG/ML
INJECTION INTRAMUSCULAR; INTRAVENOUS
Status: DISCONTINUED | OUTPATIENT
Start: 2025-05-09 | End: 2025-05-09

## 2025-05-09 RX ORDER — DEXAMETHASONE SODIUM PHOSPHATE 4 MG/ML
INJECTION, SOLUTION INTRA-ARTICULAR; INTRALESIONAL; INTRAMUSCULAR; INTRAVENOUS; SOFT TISSUE
Status: DISCONTINUED | OUTPATIENT
Start: 2025-05-09 | End: 2025-05-09

## 2025-05-09 RX ORDER — ACETAMINOPHEN 325 MG/1
650 TABLET ORAL EVERY 4 HOURS PRN
Status: DISCONTINUED | OUTPATIENT
Start: 2025-05-09 | End: 2025-05-09 | Stop reason: HOSPADM

## 2025-05-09 RX ORDER — FENTANYL CITRATE 50 UG/ML
25 INJECTION, SOLUTION INTRAMUSCULAR; INTRAVENOUS EVERY 5 MIN PRN
Status: DISCONTINUED | OUTPATIENT
Start: 2025-05-09 | End: 2025-05-09 | Stop reason: HOSPADM

## 2025-05-09 RX ORDER — PROPOFOL 10 MG/ML
VIAL (ML) INTRAVENOUS
Status: DISCONTINUED | OUTPATIENT
Start: 2025-05-09 | End: 2025-05-09

## 2025-05-09 RX ORDER — PROTAMINE SULFATE 10 MG/ML
INJECTION, SOLUTION INTRAVENOUS
Status: DISCONTINUED | OUTPATIENT
Start: 2025-05-09 | End: 2025-05-09

## 2025-05-09 RX ORDER — LIDOCAINE HYDROCHLORIDE 20 MG/ML
INJECTION INTRAVENOUS
Status: DISCONTINUED | OUTPATIENT
Start: 2025-05-09 | End: 2025-05-09

## 2025-05-09 RX ADMIN — HEPARIN SODIUM 2700 UNITS: 1000 INJECTION, SOLUTION INTRAVENOUS; SUBCUTANEOUS at 09:05

## 2025-05-09 RX ADMIN — PHENYLEPHRINE HYDROCHLORIDE 0.25 MCG/KG/MIN: 10 INJECTION INTRAVENOUS at 08:05

## 2025-05-09 RX ADMIN — PROPOFOL 25 MG: 10 INJECTION, EMULSION INTRAVENOUS at 09:05

## 2025-05-09 RX ADMIN — ROCURONIUM BROMIDE 10 MG: 10 INJECTION INTRAVENOUS at 09:05

## 2025-05-09 RX ADMIN — ACETAMINOPHEN 650 MG: 325 TABLET ORAL at 11:05

## 2025-05-09 RX ADMIN — PHENYLEPHRINE HYDROCHLORIDE 100 MCG: 10 INJECTION INTRAVENOUS at 08:05

## 2025-05-09 RX ADMIN — VASOPRESSIN 1 UNITS: 20 INJECTION INTRAVENOUS at 10:05

## 2025-05-09 RX ADMIN — FENTANYL CITRATE 50 MCG: 50 INJECTION, SOLUTION INTRAMUSCULAR; INTRAVENOUS at 07:05

## 2025-05-09 RX ADMIN — HEPARIN SODIUM 3000 UNITS: 1000 INJECTION, SOLUTION INTRAVENOUS; SUBCUTANEOUS at 08:05

## 2025-05-09 RX ADMIN — VASOPRESSIN 4 UNITS: 20 INJECTION INTRAVENOUS at 10:05

## 2025-05-09 RX ADMIN — DEXAMETHASONE SODIUM PHOSPHATE 8 MG: 4 INJECTION, SOLUTION INTRAMUSCULAR; INTRAVENOUS at 07:05

## 2025-05-09 RX ADMIN — SUGAMMADEX 200 MG: 100 INJECTION, SOLUTION INTRAVENOUS at 10:05

## 2025-05-09 RX ADMIN — SODIUM CHLORIDE: 0.9 INJECTION, SOLUTION INTRAVENOUS at 07:05

## 2025-05-09 RX ADMIN — PROTAMINE SULFATE 90 MG: 10 INJECTION, SOLUTION INTRAVENOUS at 10:05

## 2025-05-09 RX ADMIN — HEPARIN SODIUM 12 UNITS/KG/HR: 1000 INJECTION, SOLUTION INTRAVENOUS; SUBCUTANEOUS at 08:05

## 2025-05-09 RX ADMIN — HEPARIN SODIUM 1800 UNITS: 1000 INJECTION, SOLUTION INTRAVENOUS; SUBCUTANEOUS at 09:05

## 2025-05-09 RX ADMIN — LIDOCAINE HYDROCHLORIDE 100 MG: 20 INJECTION INTRAVENOUS at 07:05

## 2025-05-09 RX ADMIN — FUROSEMIDE 40 MG: 10 INJECTION, SOLUTION INTRAVENOUS at 02:05

## 2025-05-09 RX ADMIN — MIDAZOLAM HYDROCHLORIDE 2 MG: 2 INJECTION, SOLUTION INTRAMUSCULAR; INTRAVENOUS at 07:05

## 2025-05-09 RX ADMIN — PROPOFOL 175 MG: 10 INJECTION, EMULSION INTRAVENOUS at 07:05

## 2025-05-09 RX ADMIN — HEPARIN SODIUM 1800 UNITS: 1000 INJECTION, SOLUTION INTRAVENOUS; SUBCUTANEOUS at 10:05

## 2025-05-09 RX ADMIN — ONDANSETRON 4 MG: 2 INJECTION INTRAMUSCULAR; INTRAVENOUS at 10:05

## 2025-05-09 RX ADMIN — ROCURONIUM BROMIDE 20 MG: 10 INJECTION INTRAVENOUS at 08:05

## 2025-05-09 RX ADMIN — ROCURONIUM BROMIDE 50 MG: 10 INJECTION INTRAVENOUS at 07:05

## 2025-05-09 RX ADMIN — HEPARIN SODIUM 18000 UNITS: 1000 INJECTION, SOLUTION INTRAVENOUS; SUBCUTANEOUS at 08:05

## 2025-05-09 NOTE — NURSING TRANSFER
Nursing Transfer Note      5/9/2025   1:20 PM    Nurse giving handoff:yuly orourke  Nurse receiving handoff:yuly meyer    Reason patient is being transferred: per md order    Transfer To: [facility]    Transfer via stretcher    Transfer with cardiac monitoring    Transported by rn    Order for Tele Monitor? Yes    Additional Lines: condom cath    Medicines sent: n/a    Any special needs or follow-up needed: n/a    Patient belongings transferred with patient: No    Chart send with patient: Yes    Notified: daughter

## 2025-05-09 NOTE — PLAN OF CARE
Received report from ALEXY Cazares. Patient s/p a flutter ablation, AAOx3. VSS, no c/o pain or discomfort at this time, resp even and unlabored. Sutures and stopcock to bilateral groins are FADI. No active bleeding. No hematoma noted. Post procedure protocol reviewed with patient and patient's family. Understanding verbalized. Family members at bedside. Nurse call bell within reach.

## 2025-05-09 NOTE — BRIEF OP NOTE
: Teo Giles MD  Date of Procedure: 05/09/2025     Post-operative Diagnosis: AF/AFL     Procedure Performed: Pulmonary vein isolation of all 4 pulmonary veins & CTI ablations     Description of Procedure: The patient was brought to the EP lab in the fasting state. Prepped and draped in sterile fashion. Safety timeout was performed. Sedation administered by anesthesia staff. Ultrasound guided venous access of the bilateral femoral veins was performed. ICE and CS catheters placed via left femoral vein access. 2 transseptal punctures were performed under fluoroscopic and intracardiac echocardiography (ICE) guidance.  A decapolar catheter was placed in the coronary sinus, and high-density electroanatomic mapping of the left atrium was performed using the EnSite. Activation mapping demonstrated a counterclockwise perimitral flutter circuit.  Initial endocardial ablation was performed along the mitral isthmus from the lateral mitral annulus to the left inferior pulmonary vein using an irrigated-tip RF catheter with contact force sensing. Despite extensive lesion sets, conduction persisted across the isthmus.  Given the known presence of epicardial connections via the coronary sinus, epicardial ablation was performed from within the coronary sinus .Subsequent pacing maneuvers and differential pacing confirmed bidirectional block across the mitral isthmus.    ICE confirmed no significant pericardial effusion.     There were no complications. Hemostasis was achieved, and the patient was transferred in stable condition to the recovery unit.    Estimated Blood Loss: Minimal  Complications: None  Disposition: Stable, to recovery       Plan:   Bedrest x 4 hrs   Remove sutures at 3 hours post-procedure   Ambulation at 4 hours post-procedure if there is no evidence of access site complications   Close monitoring of hemodynamics, access site, and neurologic status   ECG upon arrival to PACU   Patient to resume  OAC this evening. If bleeding or hematoma formation, please notify EP service before holding OAC   Medication changes: none   Recommend ibuprofen 800 mg TID x 3 days for pericarditis post-procedure   Plan for discharge following bedrest if patient tolerating PO intake, voiding, and ambulatory without evidence of complications      Jerica Fontanez MD, PGY8  Electrophysiology

## 2025-05-09 NOTE — Clinical Note
3D mapping patches were placed on the patient's chest and back.  Dr Johnson pulmonology Dr Ball anesthesia

## 2025-05-09 NOTE — ANESTHESIA PROCEDURE NOTES
Arterial    Diagnosis: atrial flutter    Patient location during procedure: done in OR  Procedure end time: 5/9/2025 7:42 AM    Staffing  Authorizing Provider: Skyler Avila MD  Performing Provider: Skyler Avila MD    Staffing  Performed by: Skyler Avila MD  Authorized by: Skyler Avila MD    Anesthesiologist was present at the time of the procedure.    Preanesthetic Checklist  Completed: patient identified, IV checked, risks and benefits discussed, surgical consent, monitors and equipment checked, pre-op evaluation, timeout performed and anesthesia consent givenArterial  Skin Prep: chlorhexidine gluconate  Local Infiltration: none  Orientation: right  Location: radial    Catheter Size: 20 G  Catheter placement by Ultrasound guidance. Heme positive aspiration all ports.   Vessel Caliber: medium, patent, compressibility normal  Needle advanced into vessel with real time Ultrasound guidance.  Sterile sheath used.Insertion Attempts: 1  Assessment  Dressing: secured with tape and tegaderm  Patient: Tolerated well

## 2025-05-09 NOTE — DISCHARGE SUMMARY
[facility] - Short Stay Cardiac Unit  Cardiac Electrophysiology  Discharge Summary      Patient Name: Nader Clarke  MRN: [id number]  Admission Date: 2025  Hospital Length of Stay: 0 days  Discharge Date and Time: 2025 2:44 PM  Attending Physician: Teo Giles MD    Discharging Provider: Jerica Fontanez MD  Primary Care Physician: Hang Membreno MD    HPI:   Nader Clarke is a 75 y.o. male with a history of AF s/p PVI/CTI 25 now with atypical AFL. He had recurrence of arrhythmia in the form of atypical AFL with associated symptoms.     Ablation 25:  ·  3D mapping performed with Ensite.  ·  CTI ablation.  ·  Intracardiac echo.  ·  Pulmonary vein isolation.    EC:1 atrial flutter  Meds: Eliquis 5 bid, Toprol 75 bid, Amio 200 daily  EF 55-60%    Procedure(s) (LRB):  Ablation, Atrial Flutter, Atypical (N/A)  ECHOCARDIOGRAM, TRANSESOPHAGEAL (N/A)     Indwelling Lines/Drains at time of discharge:  Lines/Drains/Airways       Drain  Duration             Male External Urinary Catheter 25 0753 Medium <1 day                    Hospital Course:  Patient underwent successful RF for treatment of atypical flutter localized to mitral flutter. No evidence of intra- or post-procedure complications. Post-ablation ECG shows NSR, and no acute abnormalities.     EP medications at discharge:  Antiarrhythmics and/or AVN agents: unchanged.   Patient was instructed to continue their oral anticoagulation as previously prescribed  Patient was instructed to take ibuprofen 800 mg TID x 3 days for pericarditis.     Groin access sites without hematoma or bleeding. Activity restrictions given to patient. Instructed to seek medical attention for shortness of breath, chest discomfort not alleviated with NSAIDs, bleeding/hematoma formation at the access sites, acute onset of neurologic symptoms, N/V, or hematemesis. At discharge the patient denied CP, SOB, access site bleeding/hematoma, or any other  complaints or evidence of complications.    ----------------------------------------------------------------------------------------     Goals of Care Treatment Preferences:  Code Status: DNR      Consults:     Significant Diagnostic Studies: N/A    Pending Diagnostic Studies:       None            Final Active Diagnoses:    Diagnosis Date Noted POA    PRINCIPAL PROBLEM:  Atypical atrial flutter [I48.4] 03/18/2025 Yes      Problems Resolved During this Admission:     No new Assessment & Plan notes have been filed under this hospital service since the last note was generated.  Service: Arrhythmia      Discharged Condition: stable    Disposition:     Follow Up:   Follow-up Information       Teo Giles MD Follow up in 3 month(s).    Specialties: Electrophysiology, Cardiovascular Disease, Cardiology  Contact information:  [address] [place] [id number]  [phone]                           Patient Instructions:   No discharge procedures on file.  Medications:  Reconciled Home Medications:      Medication List        CONTINUE taking these medications      allopurinoL 100 MG tablet  Commonly known as: ZYLOPRIM  Take 100 mg by mouth Daily.     amiodarone 200 MG Tab  Commonly known as: PACERONE  Take 1 tablet (200 mg total) by mouth 2 (two) times daily. Start 3 days after stopping flecainide.     apixaban 5 mg Tab  Commonly known as: ELIQUIS  Take 1 tablet (5 mg total) by mouth 2 (two) times daily.     ascorbic Acid 500 mg Cpsr  Commonly known as: VITAMIN C  Take 500 mg by mouth once daily.     cetirizine 5 MG tablet  Commonly known as: ZYRTEC  Take 1 tablet (5 mg total) by mouth once daily. To help w/ allergy/sinus     doxazosin 8 MG Tab  Commonly known as: CARDURA  Take 8 mg by mouth every evening.     entecavir 1 MG Tab  Commonly known as: BARACLUDE  Take 1 mg by mouth once daily.     ergocalciferol 50,000 unit Cap  Commonly known as: ERGOCALCIFEROL  Take 1 capsule (50,000 Units total) by mouth  every 7 days.     ferrous sulfate 142 mg (45 mg iron) Tbsr  Commonly known as: SLOW RELEASE IRON  Take 1 tablet by mouth once daily.     fluticasone propionate 50 mcg/actuation nasal spray  Commonly known as: FLONASE  2 sprays (100 mcg total) by Each Nostril route every evening. To help w/ allergy/sinus     furosemide 20 MG tablet  Commonly known as: LASIX  Take 1 tablet (20 mg total) by mouth 2 (two) times a day.     guaiFENesin 600 mg 12 hr tablet  Commonly known as: MUCINEX  Take 1 tablet (600 mg total) by mouth 2 (two) times daily. To help clear sinus and chest congestion     metoprolol tartrate 75 mg Tab  Take 1 tablet (75 mg total) by mouth 2 (two) times a day. For blood pressure and heart rate control     multivitamin with minerals tablet  Take 1 tablet by mouth once daily.     oxyCODONE-acetaminophen 5-325 mg per tablet  Commonly known as: PERCOCET  Take 1 tablet by mouth every 4 (four) hours as needed.     Saline NasaL 0.65 % nasal spray  Generic drug: sodium chloride  1 spray by Nasal route as needed for Congestion. To help reduce need for Afrin     VITAMIN B-12 100 MCG tablet  Generic drug: cyanocobalamin  Take 100 mcg by mouth once daily.            ASK your doctor about these medications      * omeprazole 40 MG capsule  Commonly known as: PRILOSEC  Take 40 mg by mouth once daily.  Ask about: Which instructions should I use?     * omeprazole 40 MG capsule  Commonly known as: PRILOSEC  Take 1 capsule (40 mg total) by mouth once daily.  Ask about: Which instructions should I use?           * This list has 2 medication(s) that are the same as other medications prescribed for you. Read the directions carefully, and ask your doctor or other care provider to review them with you.                  Time spent on the discharge of patient: 45 minutes    Jerica Fontanez MD  Cardiac Electrophysiology  [facility] - Short Stay Cardiac Unit

## 2025-05-09 NOTE — DISCHARGE INSTRUCTIONS
"Medications:  Make sure to continue taking your blood thinner apixiban (trade name: Eliquis) after your procedure as you would normally take  Take pantoprazole (trade name: Protonix) nightly for at least 30 days after your procedure. This is to protect your esophagus during the post-operative period.  If given a prescription of furosemide (trade name: Lasix), which is a diuretic (fluid pill), you can take it daily or twice daily as needed for fluid retention or shortness of breath following your ablation  You may experience chest discomfort (also known as "pericarditis") with deep breathes, coughing, and/or laying down which is typically normal following your procedure. If this occurs, you can take ibuprofen (Motrin) 800 mg every 8 hours for 2-3 days. If the chest pain is persistent or severe please visit the nearest emergency department.    Groin site management, precautions, and restrictions:  Remove the bandages over your groin area the morning after your procedure. You can shower after you remove these bandages. Keep the groin sites clean and dry. You do not need to apply ointments or bandages to the area.   If oozing from groin site occurs, apply pressure without letting up for 15 minutes and lay flat for 1 hour. If bleeding has resolved, you can continue to monitor. If the bleeding continues or there is significant swelling or pain in the groin area, please visit the nearest ER for evaluation and treatment. DO NOT STOP TAKING YOUR BLOOD THINNER UNLESS INSTRUCTED BY A PHYSICIAN.   Do not take baths or submerge your groin area or at least 1 week or when the puncture sites in your groin have completely healed  Do not lift anything over 10 lbs for the first week after your procedure, and avoid strenuous activity during this time to allow for the groin sites to heal. After 1 week, there are no activity restrictions.  Please contact the electrophysiology clinic or go to the ER if you experience: severe chest pain, " shortness of breath, bleeding or swelling of the groin sites, or any other concerns.

## 2025-05-09 NOTE — ANESTHESIA PROCEDURE NOTES
Intubation    Date/Time: 5/9/2025 7:26 AM    Performed by: Little Nelson CRNA  Authorized by: Skyler Avila MD    Intubation:     Induction:  Intravenous    Mask Ventilation:  Easy mask    Attempts:  1    Attempted By:  KWAN    Blade:  Pineda 4    Laryngeal View Grade: Grade I - full view of cords      Difficult Airway Encountered?: No      Complications:  None    Airway Device:  Oral endotracheal tube    Airway Device Size:  7.5    Style/Cuff Inflation:  Cuffed    Inflation Amount (mL):  5    Tube secured:  23    Secured at:  The teeth    Placement Verified By:  Capnometry    Complicating Factors:  None    Findings Post-Intubation:  BS equal bilateral

## 2025-05-09 NOTE — TRANSFER OF CARE
"Anesthesia Transfer of Care Note    Patient: Nader Clarke    Procedure(s) Performed: Procedure(s) (LRB):  Ablation, Atrial Flutter, Atypical (N/A)  ECHOCARDIOGRAM, TRANSESOPHAGEAL (N/A)    Patient location: PACU    Anesthesia Type: general    Transport from OR: Transported from OR on 6-10 L/min O2 by face mask with adequate spontaneous ventilation    Post pain: adequate analgesia    Post assessment: no apparent anesthetic complications    Post vital signs: stable    Level of consciousness: awake, alert and oriented    Nausea/Vomiting: no nausea/vomiting    Complications: none    Transfer of care protocol was followed    Last vitals: Visit Vitals  /88 (BP Location: Right arm, Patient Position: Lying)   Pulse (!) 114   Temp 36.5 °C (97.7 °F) (Temporal)   Resp 18   Ht 5' 11" (1.803 m)   Wt 90.7 kg (200 lb)   SpO2 97%   BMI 27.89 kg/m²     "

## 2025-05-09 NOTE — NURSING
1400: Sutures and stopcocks removed from bilateral groin sites. R groin dressed with gauze and tegaderm. No active bleeding, no hematoma noted. L groin oozing, pressure held x5 minutes and dressed with gauze and tegaderm. Patient HOB remains flat at this time, Dr. Fontanez notified.     1415: Dr. Denise @ bedside. Patient's bilateral groin sites both appear CDI. No active bleeding no hematoma noted. Patient HOB elevated.     1430: Patient's bilateral groin sites remain CDI, no active bleeding/no hematoma noted. Patient sitting up on side of bed. VSS. NADN.

## 2025-05-09 NOTE — PLAN OF CARE
Dr. Fontanez @ bedside to discuss findings with patient. Patient able to eat, ambulate and void independently post procedure. Discharge instructions and prescriptions reviewed with patient. Patient verbalizes understanding. VSS. IV removed. PEPE.

## 2025-05-09 NOTE — ASSESSMENT & PLAN NOTE
74 yoM atypical flutter after PVI/CTI. I offered repeat ablation. I had extensive discussion with patient regarding risks and benefits of ablation, and the patient would like to proceed. Risks of procedure include (but are not limited to) bleeding, stroke, perforation requiring emergency draining or surgery, AV block, death, AV fistula, AE fistula, PV stenosis.     Last anti-coagulation dose night prior to procedure.  Discontinue antiarrhytmic drugs 4 days prior to the procedure.      RF atypical flutter >> MARCIA

## 2025-05-09 NOTE — ANESTHESIA PREPROCEDURE EVALUATION
05/08/2025  Nader Clarke is a 75 y.o., male with atypical atrial flutter here for ablation.    Pre-operative evaluation for Procedure(s) (LRB):  Ablation, Atrial Flutter, Atypical (N/A)  ECHOCARDIOGRAM, TRANSESOPHAGEAL (N/A)    Nader Clarke is a 75 y.o. male     Problem List[1]    Review of patient's allergies indicates:   Allergen Reactions    Augmentin [amoxicillin-pot clavulanate] Hives and Rash       Medications Ordered Prior to Encounter[2]    Past Surgical History:   Procedure Laterality Date    ABLATION OF ARRHYTHMOGENIC FOCUS FOR ATRIAL FIBRILLATION N/A 1/25/2025    Procedure: Ablation atrial fibrillation;  Surgeon: Teo Giles MD;  Location: [facility] EP LAB;  Service: Cardiology;  Laterality: N/A;  PAF, MARILYNN (Cx if SR), PVI, CTI, RFA, MARCIA, GEN, MB, 3 Prep    ABLATION, ATRIAL FLUTTER, TYPICAL N/A 1/25/2025    Procedure: Ablation, Atrial Flutter, Typical;  Surgeon: Teo Giles MD;  Location: [facility] EP LAB;  Service: Cardiology;  Laterality: N/A;  PAF, MARILYNN (Cx if SR), PVI, CTI, RFA, MARCIA, GEN, MB, 3 Prep    BALLOON SINUPLASTY OF PARANASAL SINUS      CARDIOVERSION N/A 10/24/2024    Procedure: Cardioversion;  Surgeon: Teo Agrawal MD;  Location: [facility] CATH LAB;  Service: Cardiology;  Laterality: N/A;    CARPUL TUNNEL      RIGHT HAND    ECHOCARDIOGRAM,TRANSESOPHAGEAL N/A 12/02/2024    Procedure: Transesophageal echo (MARILYNN) intra-procedure log documentation;  Surgeon: Teo Ware MD;  Location: [facility] EP LAB;  Service: Cardiology;  Laterality: N/A;    ECHOCARDIOGRAM,TRANSESOPHAGEAL N/A 1/25/2025    Procedure: Transesophageal echo (MARILYNN) intra-procedure log documentation;  Surgeon: Shiv De León III, MD;  Location: [facility] EP LAB;  Service: Cardiology;  Laterality: N/A;    NERVE REPAIR      RIGHT ARM    perforated bowel  2013    RIGHT KNEE REPLACEMENT 7/21/2015      TOTAL KNEE  "ARTHROPLASTY Left 2020    TRANSESOPHAGEAL ECHOCARDIOGRAM WITH POSSIBLE CARDIOVERSION (MARILYNN W/ POSS CARDIOVERSION) N/A 10/24/2024    Procedure: Transesophageal echo (MARILYNN) intra-procedure log documentation;  Surgeon: Teo Agrawal MD;  Location: [facility] CATH LAB;  Service: Cardiology;  Laterality: N/A;    TREATMENT OF CARDIAC ARRHYTHMIA N/A 12/02/2024    Procedure: Cardioversion or Defibrillation;  Surgeon: Jeff Lee MD;  Location: [facility] EP LAB;  Service: Cardiology;  Laterality: N/A;  AF, MARILYNN, DCCV, MAC, MB, 3 Prep       Social History[3]      CBC: No results for input(s): "WBC", "RBC", "HGB", "HCT", "PLT", "MCV", "MCH", "MCHC" in the last 72 hours.    CMP: No results for input(s): "NA", "K", "CL", "CO2", "BUN", "CREATININE", "GLU", "MG", "PHOS", "CALCIUM", "ALBUMIN", "PROT", "ALKPHOS", "ALT", "AST", "BILITOT" in the last 72 hours.    INR  No results for input(s): "PT", "INR", "PROTIME", "APTT" in the last 72 hours.            2D Echo:  No results found for this or any previous visit.        Pre-op Assessment    I have reviewed the Patient Summary Reports.     I have reviewed the Nursing Notes.    I have reviewed the Medications.     Review of Systems  Anesthesia Hx:  No problems with previous Anesthesia                Hematology/Oncology:  Hematology Normal   Oncology Normal                                   EENT/Dental:  EENT/Dental Normal           Cardiovascular:     Hypertension    Dysrhythmias                                      Pulmonary:  Pulmonary Normal                       Renal/:  Renal/ Normal                 Hepatic/GI:  Hepatic/GI Normal                    Musculoskeletal:  Musculoskeletal Normal                Neurological:  Neurology Normal                                      Endocrine:  Endocrine Normal            Dermatological:  Skin Normal    Psych:  Psychiatric Normal                    Physical Exam  General: Well nourished    Airway:  Mallampati: II   Mouth Opening: Normal  TM " Distance: Normal  Tongue: Normal  Neck ROM: Normal ROM    Dental:  Intact    Chest/Lungs:  Normal Respiratory Rate    Heart:  Rate: Normal        Anesthesia Plan  Type of Anesthesia, risks & benefits discussed:    Anesthesia Type: Gen ETT  Intra-op Monitoring Plan: Standard ASA Monitors and Art Line  Post Op Pain Control Plan: multimodal analgesia and IV/PO Opioids PRN  Induction:  IV  Airway Plan: Direct, Post-Induction  Informed Consent: Informed consent signed with the Patient and all parties understand the risks and agree with anesthesia plan.  All questions answered. Patient consented to blood products? Yes  ASA Score: 3  Day of Surgery Review of History & Physical: H&P Update referred to the surgeon/provider.  Anesthesia Plan Notes: Discussed plan for General endotracheal anesthesia    Ready For Surgery From Anesthesia Perspective.     .           [1]   Patient Active Problem List  Diagnosis    Chronic viral hepatitis B without delta agent and without coma    Vitamin D deficiency    Primary osteoarthritis of right knee    Hx of bariatric surgery    Essential hypertension    CKD (chronic kidney disease), stage III    Paroxysmal atrial fibrillation    Aortic valve stenosis    Acute CHF    Tobacco dependence due to cigarettes    Advance care planning    Situational disturbance    Macrocytic anemia    Chronic rhinosinusitis    Viral upper respiratory tract infection    Rhinitis medicamentosa    MARTA (acute kidney injury)    Tobacco abuse    S/P subtotal gastrectomy    Chronic obstructive pulmonary disease    Hepatocellular carcinoma    Typical atrial flutter    Gross hematuria    Atypical atrial flutter   [2]   No current facility-administered medications on file prior to encounter.     Current Outpatient Medications on File Prior to Encounter   Medication Sig Dispense Refill    allopurinol (ZYLOPRIM) 100 MG tablet Take 100 mg by mouth Daily.      amiodarone (PACERONE) 200 MG Tab Take 1 tablet (200 mg total) by  mouth 2 (two) times daily. Start 3 days after stopping flecainide. 60 tablet 11    apixaban (ELIQUIS) 5 mg Tab Take 1 tablet (5 mg total) by mouth 2 (two) times daily. 180 tablet 3    ascorbic Acid (VITAMIN C) 500 mg CpSR Take 500 mg by mouth once daily.      cetirizine (ZYRTEC) 5 MG tablet Take 1 tablet (5 mg total) by mouth once daily. To help w/ allergy/sinus 30 tablet 0    cyanocobalamin (VITAMIN B-12) 100 MCG tablet Take 100 mcg by mouth once daily.      doxazosin (CARDURA) 8 MG Tab Take 8 mg by mouth every evening.  3    entecavir (BARACLUDE) 1 MG Tab Take 1 mg by mouth once daily.      ergocalciferol (ERGOCALCIFEROL) 50,000 unit Cap Take 1 capsule (50,000 Units total) by mouth every 7 days. 12 capsule 3    ferrous sulfate (SLOW RELEASE IRON) 142 mg (45 mg iron) TbSR Take 1 tablet by mouth once daily.      fluticasone propionate (FLONASE) 50 mcg/actuation nasal spray 2 sprays (100 mcg total) by Each Nostril route every evening. To help w/ allergy/sinus 16 g 0    furosemide (LASIX) 20 MG tablet Take 1 tablet (20 mg total) by mouth 2 (two) times a day. 180 tablet 3    guaiFENesin (MUCINEX) 600 mg 12 hr tablet Take 1 tablet (600 mg total) by mouth 2 (two) times daily. To help clear sinus and chest congestion 20 tablet 0    metoprolol tartrate 75 mg Tab Take 1 tablet (75 mg total) by mouth 2 (two) times a day. For blood pressure and heart rate control 180 tablet 3    multivitamin with minerals tablet Take 1 tablet by mouth once daily.      oxyCODONE-acetaminophen (PERCOCET) 5-325 mg per tablet Take 1 tablet by mouth every 4 (four) hours as needed.      pantoprazole (PROTONIX) 40 MG tablet Take 1 tablet by mouth once daily.      sodium chloride (SALINE NASAL) 0.65 % nasal spray 1 spray by Nasal route as needed for Congestion. To help reduce need for Afrin 60 mL 0   [3]   Social History  Socioeconomic History    Marital status:    Tobacco Use    Smoking status: Every Day     Current packs/day: 2.00      Average packs/day: 2.0 packs/day for 1.3 years (2.7 ttl pk-yrs)     Types: Cigars, Cigarettes     Start date: 2024     Last attempt to quit: 7/6/2016    Smokeless tobacco: Current    Tobacco comments:     cigars   Substance and Sexual Activity    Alcohol use: Not Currently     Alcohol/week: 6.0 standard drinks of alcohol     Types: 6 Shots of liquor per week     Comment: weekly    Drug use: No    Sexual activity: Yes     Partners: Female     Social Drivers of Health     Financial Resource Strain: Low Risk  (2/11/2025)    Overall Financial Resource Strain (CARDIA)     Difficulty of Paying Living Expenses: Not very hard   Food Insecurity: No Food Insecurity (2/11/2025)    Hunger Vital Sign     Worried About Running Out of Food in the Last Year: Never true     Ran Out of Food in the Last Year: Never true   Transportation Needs: No Transportation Needs (2/11/2025)    PRAPARE - Transportation     Lack of Transportation (Medical): No     Lack of Transportation (Non-Medical): No   Physical Activity: Insufficiently Active (2/11/2025)    Exercise Vital Sign     Days of Exercise per Week: 1 day     Minutes of Exercise per Session: 10 min   Stress: Patient Declined (2/11/2025)    [employer] - Occupational Stress Questionnaire     Feeling of Stress : Patient declined   Housing Stability: Low Risk  (2/11/2025)    Housing Stability Vital Sign     Unable to Pay for Housing in the Last Year: No     Number of Times Moved in the Last Year: 0     Homeless in the Last Year: No

## 2025-05-09 NOTE — HPI
Nader Clarke is a 75 y.o. male with a history of AF s/p PVI/CTI 25 now with atypical AFL. He had recurrence of arrhythmia in the form of atypical AFL with associated symptoms.     Ablation 25:  ·  3D mapping performed with Ensite.  ·  CTI ablation.  ·  Intracardiac echo.  ·  Pulmonary vein isolation.    EC:1 atrial flutter  Meds: Eliquis 5 bid, Toprol 75 bid, Amio 200 daily  EF 55-60%

## 2025-05-09 NOTE — H&P
[facility], [place]  MARILYNN Consult      Nader Clarke  YOB: 1950  Medical Record Number:  [id number]  Attending Physician:  Teo Giles MD   Current Principal Problem:  Atypical atrial flutter    History       HPI  Mr Nader Clarke is a 75 year old gentleman with PMH of atrial fibrillation, HTN, COPD, CKD, chronic HBV, hepatocellular carcinoma who presents to [facility] for atrial flutter ablation with Dr. Giles. History of paroxysmal AF and AFL recurrent following cardioversion. He was offered redo ablation by Dr. Giles and agreed to proceed.       [id number] 2  Anticoagulant/antiplatelets: eliquis, compliant  ECG: atrial flutter    Dysphagia or odynophagia:  No  Liver Disease, esophageal disease, or known varices:  No  Upper GI Bleeding: No  Snoring:  No  Sleep Apnea:  No  Prior neck surgery or radiation:  No  History of anesthetic difficulties:  No  Family history of anesthetic difficulties:  No  Last oral intake: yesterday before midnight  Able to move neck in all directions:  Yes    Medications - Outpatient  Prior to Admission medications    Medication Sig Start Date End Date Taking? Authorizing Provider   allopurinol (ZYLOPRIM) 100 MG tablet Take 100 mg by mouth Daily.   Yes Provider, Historical   apixaban (ELIQUIS) 5 mg Tab Take 1 tablet (5 mg total) by mouth 2 (two) times daily. 12/25/24  Yes Teo Agrawal MD   ascorbic Acid (VITAMIN C) 500 mg CpSR Take 500 mg by mouth once daily.   Yes Provider, Historical   cyanocobalamin (VITAMIN B-12) 100 MCG tablet Take 100 mcg by mouth once daily.   Yes Provider, Historical   doxazosin (CARDURA) 8 MG Tab Take 8 mg by mouth every evening. 1/12/17  Yes Provider, Historical   entecavir (BARACLUDE) 1 MG Tab Take 1 mg by mouth once daily. 10/1/24  Yes Provider, Historical   ergocalciferol (ERGOCALCIFEROL) 50,000 unit Cap Take 1 capsule (50,000 Units total) by mouth every 7 days. 12/14/17  Yes Shashank Tran MD   ferrous sulfate  (SLOW RELEASE IRON) 142 mg (45 mg iron) TbSR Take 1 tablet by mouth once daily.   Yes Provider, Historical   furosemide (LASIX) 20 MG tablet Take 1 tablet (20 mg total) by mouth 2 (two) times a day. 3/14/25  Yes Teo Agrawal MD   guaiFENesin (MUCINEX) 600 mg 12 hr tablet Take 1 tablet (600 mg total) by mouth 2 (two) times daily. To help clear sinus and chest congestion 10/26/24  Yes Tad Rebolledo MD   multivitamin with minerals tablet Take 1 tablet by mouth once daily.   Yes Provider, Historical   omeprazole (PRILOSEC) 40 MG capsule Take 40 mg by mouth once daily.   Yes Provider, Historical   oxyCODONE-acetaminophen (PERCOCET) 5-325 mg per tablet Take 1 tablet by mouth every 4 (four) hours as needed. 7/26/24  Yes Provider, Historical   amiodarone (PACERONE) 200 MG Tab Take 1 tablet (200 mg total) by mouth 2 (two) times daily. Start 3 days after stopping flecainide. 3/17/25 3/17/26  Teo Agrawal MD   cetirizine (ZYRTEC) 5 MG tablet Take 1 tablet (5 mg total) by mouth once daily. To help w/ allergy/sinus 10/26/24   Tad Rebolledo MD   fluticasone propionate (FLONASE) 50 mcg/actuation nasal spray 2 sprays (100 mcg total) by Each Nostril route every evening. To help w/ allergy/sinus 10/26/24   Tad Rebolledo MD   metoprolol tartrate 75 mg Tab Take 1 tablet (75 mg total) by mouth 2 (two) times a day. For blood pressure and heart rate control 3/14/25 3/14/26  Teo Agrawal MD   pantoprazole (PROTONIX) 40 MG tablet Take 1 tablet by mouth once daily. 7/8/24   Provider, Historical   sodium chloride (SALINE NASAL) 0.65 % nasal spray 1 spray by Nasal route as needed for Congestion. To help reduce need for Afrin 10/26/24   Tad Rebolledo MD       Medications - Current  Scheduled Meds:  Continuous Infusions:    Allergies  Review of patient's allergies indicates:   Allergen Reactions    Augmentin [amoxicillin-pot clavulanate] Hives and Rash     Past Medical History  Past Medical History:    Diagnosis Date    Arthritis of big toe     RIGHT FOOT BIG TOE    Chronic hepatitis B     Chronic rhinosinusitis     Gout     Hypertension     Vitamin D deficiency      Past Surgical History  Past Surgical History:   Procedure Laterality Date    ABLATION OF ARRHYTHMOGENIC FOCUS FOR ATRIAL FIBRILLATION N/A 1/25/2025    Procedure: Ablation atrial fibrillation;  Surgeon: Teo Giles MD;  Location: [facility] EP LAB;  Service: Cardiology;  Laterality: N/A;  PAF, MARILYNN (Cx if SR), PVI, CTI, RFA, MARCIA, GEN, MB, 3 Prep    ABLATION, ATRIAL FLUTTER, TYPICAL N/A 1/25/2025    Procedure: Ablation, Atrial Flutter, Typical;  Surgeon: Teo Giles MD;  Location: [facility] EP LAB;  Service: Cardiology;  Laterality: N/A;  PAF, MARILYNN (Cx if SR), PVI, CTI, RFA, MARCIA, GEN, MB, 3 Prep    BALLOON SINUPLASTY OF PARANASAL SINUS      CARDIOVERSION N/A 10/24/2024    Procedure: Cardioversion;  Surgeon: Teo Agrawal MD;  Location: [facility] CATH LAB;  Service: Cardiology;  Laterality: N/A;    CARPUL TUNNEL      RIGHT HAND    ECHOCARDIOGRAM,TRANSESOPHAGEAL N/A 12/02/2024    Procedure: Transesophageal echo (MARILYNN) intra-procedure log documentation;  Surgeon: Teo Ware MD;  Location: [facility] EP LAB;  Service: Cardiology;  Laterality: N/A;    ECHOCARDIOGRAM,TRANSESOPHAGEAL N/A 1/25/2025    Procedure: Transesophageal echo (MARILYNN) intra-procedure log documentation;  Surgeon: Shiv De León III, MD;  Location: [facility] EP LAB;  Service: Cardiology;  Laterality: N/A;    NERVE REPAIR      RIGHT ARM    perforated bowel  2013    RIGHT KNEE REPLACEMENT 7/21/2015      TOTAL KNEE ARTHROPLASTY Left 2020    TRANSESOPHAGEAL ECHOCARDIOGRAM WITH POSSIBLE CARDIOVERSION (MARILYNN W/ POSS CARDIOVERSION) N/A 10/24/2024    Procedure: Transesophageal echo (MARILYNN) intra-procedure log documentation;  Surgeon: Teo Agrawal MD;  Location: [facility] CATH LAB;  Service: Cardiology;  Laterality: N/A;    TREATMENT OF CARDIAC ARRHYTHMIA N/A 12/02/2024    Procedure:  Cardioversion or Defibrillation;  Surgeon: Jeff Lee MD;  Location: [facility] LAB;  Service: Cardiology;  Laterality: N/A;  AF, MARILYNN, DCCV, MAC, MB, 3 Prep     ROS  10 point ROS performed and negative except as stated in HPI     Physical Examination   Vital Signs  Vitals  Temp: 97.7 °F (36.5 °C)  Temp Source: Temporal  Pulse: (!) 114  Heart Rate Source: SpO2  Resp: 18  SpO2: 97 %  BP: 136/88  MAP (mmHg): 107  BP Location: Right arm  BP Method: Automatic  Patient Position: Lying    24 Hour VS Range  Temp:  [97.7 °F (36.5 °C)]   Pulse:  [114]   Resp:  [18]   BP: (136-150)/(88-95)   SpO2:  [97 %]   No intake or output data in the 24 hours ending 05/09/25 0639      Physical Exam  Constitutional:       Appearance: Normal appearance. He is obese.   Eyes:      Pupils: Pupils are equal, round, and reactive to light.   Neck:      Vascular: No JVD.   Cardiovascular:      Rate and Rhythm: Normal rate and regular rhythm.      Pulses: Normal pulses.      Heart sounds: Normal heart sounds.   Pulmonary:      Effort: Pulmonary effort is normal.      Breath sounds: Normal breath sounds.   Abdominal:      Palpations: Abdomen is soft.   Musculoskeletal:      Right lower leg: No edema.      Left lower leg: No edema.   Skin:     General: Skin is warm.   Neurological:      General: No focal deficit present.      Mental Status: He is alert and oriented to person, place, and time.   Psychiatric:         Mood and Affect: Mood normal.         Behavior: Behavior normal.         Data     Recent Labs   Lab 05/03/25  1132   WBC 8.99   HGB 12.0*   HCT 36.6*   *      Recent Labs   Lab 05/03/25  1132   PROTIME 13.2*   INR 1.2      Recent Labs   Lab 05/03/25  1132      K 3.7      CO2 22*   BUN 33*   CREATININE 2.1*   ANIONGAP 10   CALCIUM 8.4*        MARILYNN prior to ablation. No thrombus seen.    Left Atrium: Left atrium is dilated. The left atrial appendage appears normal. The left atrial appendage has a chicken wing morphology.  Appendage velocity is reduced at less than 40 cm/sec. The pulmonary veins appear normal with systolic blunting. There is no thrombus in the cavity.    Left Ventricle: The left ventricle is normal in size. Normal wall thickness. There is normal systolic function with a visually estimated ejection fraction of 55 - 60%.    Right Ventricle: Normal right ventricular cavity size. Wall thickness is normal. Systolic function is normal.    Aortic Valve: The aortic valve is a trileaflet valve. There is moderate aortic valve sclerosis. There is annular calcification present. Mildly restricted motion. Unable to accurately assess severity of aortic stenosis but visually appears mild. There is mild to moderate aortic regurgitation.    Mitral Valve: There is moderate mitral annular calcification present. There is mild to moderate regurgitation.    Aorta: Ascending aorta is mildly dilated measuring 4.4 cm.    Pericardium: There is no pericardial effusion.    Assessment & Plan     MARILYNN for SURAJ assessment prior to ablation  -No absolute contraindications of esophageal stricture, tumor, perforation, laceration,or diverticulum and/or active GI bleed  -The risks, benefits & alternatives of the procedure were explained to the patient.   -The risks of transesophageal echo include but are not limited to:  Dental trauma, esophageal trauma/perforation, bleeding, laryngospasm/brochospasm, aspiration, sore throat/hoarseness, & dislodgement of the endotracheal tube/nasogastric tube (where applicable).    -The risks of ANES monitored sedation include hypotension, respiratory depression, arrhythmias, bronchospasm, & death.    -Informed consent was obtained. The patient is agreeable to proceed with the procedure and all questions and concerns addressed.    Case discussed with an attending in echocardiography lab.    Jocelyn Morgan MD  [facility]   Cardiovascular Disease PGY-V

## 2025-05-09 NOTE — SUBJECTIVE & OBJECTIVE
Past Medical History:   Diagnosis Date    Arthritis of big toe     RIGHT FOOT BIG TOE    Chronic hepatitis B     Chronic rhinosinusitis     Gout     Hypertension     Vitamin D deficiency        Past Surgical History:   Procedure Laterality Date    ABLATION OF ARRHYTHMOGENIC FOCUS FOR ATRIAL FIBRILLATION N/A 1/25/2025    Procedure: Ablation atrial fibrillation;  Surgeon: Teo Giles MD;  Location: [facility] EP LAB;  Service: Cardiology;  Laterality: N/A;  PAF, MARILYNN (Cx if SR), PVI, CTI, RFA, MARCIA, GEN, MB, 3 Prep    ABLATION, ATRIAL FLUTTER, TYPICAL N/A 1/25/2025    Procedure: Ablation, Atrial Flutter, Typical;  Surgeon: Teo Giles MD;  Location: [facility] EP LAB;  Service: Cardiology;  Laterality: N/A;  PAF, MARILYNN (Cx if SR), PVI, CTI, RFA, MARCIA, GEN, MB, 3 Prep    BALLOON SINUPLASTY OF PARANASAL SINUS      CARDIOVERSION N/A 10/24/2024    Procedure: Cardioversion;  Surgeon: Teo Agrawal MD;  Location: [facility] CATH LAB;  Service: Cardiology;  Laterality: N/A;    CARPUL TUNNEL      RIGHT HAND    ECHOCARDIOGRAM,TRANSESOPHAGEAL N/A 12/02/2024    Procedure: Transesophageal echo (MARILYNN) intra-procedure log documentation;  Surgeon: Teo Ware MD;  Location: [facility] EP LAB;  Service: Cardiology;  Laterality: N/A;    ECHOCARDIOGRAM,TRANSESOPHAGEAL N/A 1/25/2025    Procedure: Transesophageal echo (MARILYNN) intra-procedure log documentation;  Surgeon: Shiv De León III, MD;  Location: [facility] EP LAB;  Service: Cardiology;  Laterality: N/A;    NERVE REPAIR      RIGHT ARM    perforated bowel  2013    RIGHT KNEE REPLACEMENT 7/21/2015      TOTAL KNEE ARTHROPLASTY Left 2020    TRANSESOPHAGEAL ECHOCARDIOGRAM WITH POSSIBLE CARDIOVERSION (MARILYNN W/ POSS CARDIOVERSION) N/A 10/24/2024    Procedure: Transesophageal echo (MARILYNN) intra-procedure log documentation;  Surgeon: Teo Agrawal MD;  Location: [facility] CATH LAB;  Service: Cardiology;  Laterality: N/A;    TREATMENT OF CARDIAC ARRHYTHMIA N/A 12/02/2024    Procedure:  Cardioversion or Defibrillation;  Surgeon: Jeff Lee MD;  Location: [facility] EP LAB;  Service: Cardiology;  Laterality: N/A;  AF, MARILYNN, DCCV, MAC, MB, 3 Prep       Review of patient's allergies indicates:   Allergen Reactions    Augmentin [amoxicillin-pot clavulanate] Hives and Rash       No current facility-administered medications on file prior to encounter.     Current Outpatient Medications on File Prior to Encounter   Medication Sig    allopurinol (ZYLOPRIM) 100 MG tablet Take 100 mg by mouth Daily.    amiodarone (PACERONE) 200 MG Tab Take 1 tablet (200 mg total) by mouth 2 (two) times daily. Start 3 days after stopping flecainide.    apixaban (ELIQUIS) 5 mg Tab Take 1 tablet (5 mg total) by mouth 2 (two) times daily.    ascorbic Acid (VITAMIN C) 500 mg CpSR Take 500 mg by mouth once daily.    cetirizine (ZYRTEC) 5 MG tablet Take 1 tablet (5 mg total) by mouth once daily. To help w/ allergy/sinus    cyanocobalamin (VITAMIN B-12) 100 MCG tablet Take 100 mcg by mouth once daily.    doxazosin (CARDURA) 8 MG Tab Take 8 mg by mouth every evening.    entecavir (BARACLUDE) 1 MG Tab Take 1 mg by mouth once daily.    ergocalciferol (ERGOCALCIFEROL) 50,000 unit Cap Take 1 capsule (50,000 Units total) by mouth every 7 days.    ferrous sulfate (SLOW RELEASE IRON) 142 mg (45 mg iron) TbSR Take 1 tablet by mouth once daily.    fluticasone propionate (FLONASE) 50 mcg/actuation nasal spray 2 sprays (100 mcg total) by Each Nostril route every evening. To help w/ allergy/sinus    furosemide (LASIX) 20 MG tablet Take 1 tablet (20 mg total) by mouth 2 (two) times a day.    guaiFENesin (MUCINEX) 600 mg 12 hr tablet Take 1 tablet (600 mg total) by mouth 2 (two) times daily. To help clear sinus and chest congestion    metoprolol tartrate 75 mg Tab Take 1 tablet (75 mg total) by mouth 2 (two) times a day. For blood pressure and heart rate control    multivitamin with minerals tablet Take 1 tablet by mouth once daily.     oxyCODONE-acetaminophen (PERCOCET) 5-325 mg per tablet Take 1 tablet by mouth every 4 (four) hours as needed.    pantoprazole (PROTONIX) 40 MG tablet Take 1 tablet by mouth once daily.    sodium chloride (SALINE NASAL) 0.65 % nasal spray 1 spray by Nasal route as needed for Congestion. To help reduce need for Afrin     Family History    None       Tobacco Use    Smoking status: Every Day     Current packs/day: 2.00     Average packs/day: 2.0 packs/day for 1.3 years (2.7 ttl pk-yrs)     Types: Cigars, Cigarettes     Start date: 2024     Last attempt to quit: 7/6/2016    Smokeless tobacco: Current    Tobacco comments:     cigars   Substance and Sexual Activity    Alcohol use: Not Currently     Alcohol/week: 6.0 standard drinks of alcohol     Types: 6 Shots of liquor per week     Comment: weekly    Drug use: No    Sexual activity: Yes     Partners: Female     Review of Systems   All other systems reviewed and are negative.    Objective:     Vital Signs (Most Recent):    Vital Signs (24h Range):  BP: ()/()   Arterial Line BP: ()/()           There is no height or weight on file to calculate BMI.             Physical Exam  Vitals and nursing note reviewed.   Constitutional:       Appearance: Normal appearance.   HENT:      Head: Normocephalic.   Cardiovascular:      Rate and Rhythm: Normal rate and regular rhythm.      Pulses: Normal pulses.      Heart sounds: Normal heart sounds.   Pulmonary:      Effort: Pulmonary effort is normal.      Breath sounds: Normal breath sounds.   Musculoskeletal:      Right lower leg: No edema.      Left lower leg: No edema.   Skin:     General: Skin is warm.   Neurological:      General: No focal deficit present.      Mental Status: He is alert.          Significant Labs: All pertinent lab results from the last 24 hours have been reviewed.

## 2025-05-09 NOTE — H&P
[facility] - Short Stay Cardiac Unit  Cardiac Electrophysiology  History and Physical     Admission Date: 2025  Code Status: Prior   Attending Provider: Teo Giles MD   Principal Problem:Atypical atrial flutter    Subjective:     Chief Complaint:  AFL     HPI:  Nader Clarke is a 75 y.o. male with a history of AF s/p PVI/CTI 25 now with atypical AFL. He had recurrence of arrhythmia in the form of atypical AFL with associated symptoms.     Ablation 25:  ·  3D mapping performed with Ensite.  ·  CTI ablation.  ·  Intracardiac echo.  ·  Pulmonary vein isolation.    EC:1 atrial flutter  Meds: Eliquis 5 bid, Toprol 75 bid, Amio 200 daily  EF 55-60%    Past Medical History:   Diagnosis Date    Arthritis of big toe     RIGHT FOOT BIG TOE    Chronic hepatitis B     Chronic rhinosinusitis     Gout     Hypertension     Vitamin D deficiency        Past Surgical History:   Procedure Laterality Date    ABLATION OF ARRHYTHMOGENIC FOCUS FOR ATRIAL FIBRILLATION N/A 2025    Procedure: Ablation atrial fibrillation;  Surgeon: Teo Giles MD;  Location: [facility] EP LAB;  Service: Cardiology;  Laterality: N/A;  PAF, MARILYNN (Cx if SR), PVI, CTI, RFA, MARCIA, GEN, MB, 3 Prep    ABLATION, ATRIAL FLUTTER, TYPICAL N/A 2025    Procedure: Ablation, Atrial Flutter, Typical;  Surgeon: Teo Giles MD;  Location: [facility] EP LAB;  Service: Cardiology;  Laterality: N/A;  PAF, MARILYNN (Cx if SR), PVI, CTI, RFA, MARCIA, GEN, MB, 3 Prep    BALLOON SINUPLASTY OF PARANASAL SINUS      CARDIOVERSION N/A 10/24/2024    Procedure: Cardioversion;  Surgeon: Teo Agrawal MD;  Location: [facility] CATH LAB;  Service: Cardiology;  Laterality: N/A;    CARPUL TUNNEL      RIGHT HAND    ECHOCARDIOGRAM,TRANSESOPHAGEAL N/A 2024    Procedure: Transesophageal echo (MARILYNN) intra-procedure log documentation;  Surgeon: Teo Ware MD;  Location: [facility] EP LAB;  Service: Cardiology;  Laterality: N/A;     ECHOCARDIOGRAM,TRANSESOPHAGEAL N/A 1/25/2025    Procedure: Transesophageal echo (MARILYNN) intra-procedure log documentation;  Surgeon: Shiv De León III, MD;  Location: [facility] EP LAB;  Service: Cardiology;  Laterality: N/A;    NERVE REPAIR      RIGHT ARM    perforated bowel  2013    RIGHT KNEE REPLACEMENT 7/21/2015      TOTAL KNEE ARTHROPLASTY Left 2020    TRANSESOPHAGEAL ECHOCARDIOGRAM WITH POSSIBLE CARDIOVERSION (MARILYNN W/ POSS CARDIOVERSION) N/A 10/24/2024    Procedure: Transesophageal echo (MARILYNN) intra-procedure log documentation;  Surgeon: Teo Agrawal MD;  Location: [facility] CATH LAB;  Service: Cardiology;  Laterality: N/A;    TREATMENT OF CARDIAC ARRHYTHMIA N/A 12/02/2024    Procedure: Cardioversion or Defibrillation;  Surgeon: Jeff Lee MD;  Location: [facility] EP LAB;  Service: Cardiology;  Laterality: N/A;  AF, MARILYNN, DCCV, MAC, MB, 3 Prep       Review of patient's allergies indicates:   Allergen Reactions    Augmentin [amoxicillin-pot clavulanate] Hives and Rash       No current facility-administered medications on file prior to encounter.     Current Outpatient Medications on File Prior to Encounter   Medication Sig    allopurinol (ZYLOPRIM) 100 MG tablet Take 100 mg by mouth Daily.    amiodarone (PACERONE) 200 MG Tab Take 1 tablet (200 mg total) by mouth 2 (two) times daily. Start 3 days after stopping flecainide.    apixaban (ELIQUIS) 5 mg Tab Take 1 tablet (5 mg total) by mouth 2 (two) times daily.    ascorbic Acid (VITAMIN C) 500 mg CpSR Take 500 mg by mouth once daily.    cetirizine (ZYRTEC) 5 MG tablet Take 1 tablet (5 mg total) by mouth once daily. To help w/ allergy/sinus    cyanocobalamin (VITAMIN B-12) 100 MCG tablet Take 100 mcg by mouth once daily.    doxazosin (CARDURA) 8 MG Tab Take 8 mg by mouth every evening.    entecavir (BARACLUDE) 1 MG Tab Take 1 mg by mouth once daily.    ergocalciferol (ERGOCALCIFEROL) 50,000 unit Cap Take 1 capsule (50,000 Units total) by mouth every 7 days.     ferrous sulfate (SLOW RELEASE IRON) 142 mg (45 mg iron) TbSR Take 1 tablet by mouth once daily.    fluticasone propionate (FLONASE) 50 mcg/actuation nasal spray 2 sprays (100 mcg total) by Each Nostril route every evening. To help w/ allergy/sinus    furosemide (LASIX) 20 MG tablet Take 1 tablet (20 mg total) by mouth 2 (two) times a day.    guaiFENesin (MUCINEX) 600 mg 12 hr tablet Take 1 tablet (600 mg total) by mouth 2 (two) times daily. To help clear sinus and chest congestion    metoprolol tartrate 75 mg Tab Take 1 tablet (75 mg total) by mouth 2 (two) times a day. For blood pressure and heart rate control    multivitamin with minerals tablet Take 1 tablet by mouth once daily.    oxyCODONE-acetaminophen (PERCOCET) 5-325 mg per tablet Take 1 tablet by mouth every 4 (four) hours as needed.    pantoprazole (PROTONIX) 40 MG tablet Take 1 tablet by mouth once daily.    sodium chloride (SALINE NASAL) 0.65 % nasal spray 1 spray by Nasal route as needed for Congestion. To help reduce need for Afrin     Family History    None       Tobacco Use    Smoking status: Every Day     Current packs/day: 2.00     Average packs/day: 2.0 packs/day for 1.3 years (2.7 ttl pk-yrs)     Types: Cigars, Cigarettes     Start date: 2024     Last attempt to quit: 7/6/2016    Smokeless tobacco: Current    Tobacco comments:     cigars   Substance and Sexual Activity    Alcohol use: Not Currently     Alcohol/week: 6.0 standard drinks of alcohol     Types: 6 Shots of liquor per week     Comment: weekly    Drug use: No    Sexual activity: Yes     Partners: Female     Review of Systems   All other systems reviewed and are negative.    Objective:     Vital Signs (Most Recent):    Vital Signs (24h Range):  BP: ()/()   Arterial Line BP: ()/()           There is no height or weight on file to calculate BMI.             Physical Exam  Vitals and nursing note reviewed.   Constitutional:       Appearance: Normal appearance.   HENT:      Head:  Normocephalic.   Cardiovascular:      Rate and Rhythm: Normal rate and regular rhythm.      Pulses: Normal pulses.      Heart sounds: Normal heart sounds.   Pulmonary:      Effort: Pulmonary effort is normal.      Breath sounds: Normal breath sounds.   Musculoskeletal:      Right lower leg: No edema.      Left lower leg: No edema.   Skin:     General: Skin is warm.   Neurological:      General: No focal deficit present.      Mental Status: He is alert.          Significant Labs: All pertinent lab results from the last 24 hours have been reviewed.  Assessment and Plan:     * Atypical atrial flutter  74 yoM atypical flutter after PVI/CTI. I offered repeat ablation. I had extensive discussion with patient regarding risks and benefits of ablation, and the patient would like to proceed. Risks of procedure include (but are not limited to) bleeding, stroke, perforation requiring emergency draining or surgery, AV block, death, AV fistula, AE fistula, PV stenosis.     Last anti-coagulation dose night prior to procedure.  Discontinue antiarrhytmic drugs 4 days prior to the procedure.      RF atypical flutter >> MARCIA Fontanez MD  Cardiac Electrophysiology  [facility] - Short Stay Cardiac Unit

## 2025-05-10 NOTE — ANESTHESIA POSTPROCEDURE EVALUATION
Anesthesia Post Evaluation    Patient: Nader Clarke    Procedure(s) Performed: Procedure(s) (LRB):  Ablation, Atrial Flutter, Atypical (N/A)  ECHOCARDIOGRAM, TRANSESOPHAGEAL (N/A)    Final Anesthesia Type: general      Patient location during evaluation: PACU  Patient participation: Yes- Able to Participate  Level of consciousness: awake and alert  Post-procedure vital signs: reviewed and stable  Pain management: adequate  Airway patency: patent    PONV status at discharge: No PONV  Anesthetic complications: no      Cardiovascular status: blood pressure returned to baseline  Respiratory status: unassisted  Hydration status: euvolemic  Follow-up not needed.              Vitals Value Taken Time   /64 05/09/25 15:00   Temp 36.6 °C (97.8 °F) 05/09/25 15:00   Pulse 65 05/09/25 15:00   Resp 18 05/09/25 15:00   SpO2 100 % 05/09/25 15:00         No case tracking events are documented in the log.      Pain/Leodan Score: Pain Rating Prior to Med Admin: 3 (5/9/2025 11:18 AM)  Pain Rating Post Med Admin: 3 (5/9/2025 12:00 PM)  Leodan Score: 10 (5/9/2025  1:30 PM)

## 2025-05-11 LAB
AORTIC SIZE INDEX: 1.9 CM/M2
ASCENDING AORTA: 4.1 CM
BSA FOR ECHO PROCEDURE: 2.13 M2
EJECTION FRACTION: 55 %
SINUS: 2.9 CM
STJ: 2.7 CM

## 2025-05-13 LAB
POC ACTIVATED CLOTTING TIME K: 147 SEC (ref 74–137)
POC ACTIVATED CLOTTING TIME K: 147 SEC (ref 74–137)
POC ACTIVATED CLOTTING TIME K: 320 SEC (ref 74–137)
POC ACTIVATED CLOTTING TIME K: 325 SEC (ref 74–137)
POC ACTIVATED CLOTTING TIME K: 337 SEC (ref 74–137)
POC ACTIVATED CLOTTING TIME K: 342 SEC (ref 74–137)
SAMPLE: ABNORMAL

## 2025-05-23 ENCOUNTER — TELEPHONE (OUTPATIENT)
Dept: ELECTROPHYSIOLOGY | Facility: CLINIC | Age: 75
End: 2025-05-23
Payer: MEDICARE

## 2025-05-23 DIAGNOSIS — I48.0 PAROXYSMAL ATRIAL FIBRILLATION: Primary | ICD-10-CM

## (undated) DEVICE — NDL TRNSSPTL BRK-1 18GA 98CM

## (undated) DEVICE — INTRODUCER HEMOSTASIS 7.5F

## (undated) DEVICE — PAD GROUND UNIV STYLE CORD 9IN

## (undated) DEVICE — CATH MAP BI-D HD SENSOR ENABLE

## (undated) DEVICE — INTRO AGILIS MED CRL 8.5F 71CM

## (undated) DEVICE — PACK EP DRAPE OMC

## (undated) DEVICE — COVER PRB TRNSDUC 7.6X183CM

## (undated) DEVICE — STOPCOCK 3-WAY

## (undated) DEVICE — CATH TACTFLEX SE BID CURVE F-J

## (undated) DEVICE — SHEATH HEMOSTASIS 8.5FR

## (undated) DEVICE — ELECTRODE PAD DEFIB STERILE

## (undated) DEVICE — KIT PROBE COVER WITH GEL

## (undated) DEVICE — PAD DEFIB CADENCE ADULT R2

## (undated) DEVICE — CATH BIDIRECTIONAL DF CRV 7FR

## (undated) DEVICE — R CATH ACUSON ACUNAV 8FR

## (undated) DEVICE — SET TUBING COOL POINT IRR

## (undated) DEVICE — KIT ENSITE ELECTRODE SURFACE

## (undated) DEVICE — R CATH BIDIRECTIONL DF CRV 7FR

## (undated) DEVICE — CATH ADVISOR HD GRID X SENSOR